# Patient Record
Sex: MALE | Race: WHITE | NOT HISPANIC OR LATINO | URBAN - METROPOLITAN AREA
[De-identification: names, ages, dates, MRNs, and addresses within clinical notes are randomized per-mention and may not be internally consistent; named-entity substitution may affect disease eponyms.]

---

## 2017-05-02 ENCOUNTER — OUTPATIENT (OUTPATIENT)
Dept: OUTPATIENT SERVICES | Facility: HOSPITAL | Age: 67
LOS: 1 days | End: 2017-05-02
Payer: MEDICARE

## 2017-05-02 DIAGNOSIS — M54.16 RADICULOPATHY, LUMBAR REGION: ICD-10-CM

## 2017-05-02 PROCEDURE — 62323 NJX INTERLAMINAR LMBR/SAC: CPT

## 2017-05-02 PROCEDURE — 77003 FLUOROGUIDE FOR SPINE INJECT: CPT

## 2018-09-06 ENCOUNTER — TRANSCRIPTION ENCOUNTER (OUTPATIENT)
Age: 68
End: 2018-09-06

## 2018-09-06 ENCOUNTER — INPATIENT (INPATIENT)
Facility: HOSPITAL | Age: 68
LOS: 6 days | Discharge: HOME | DRG: 378 | End: 2018-09-13
Attending: INTERNAL MEDICINE | Admitting: INTERNAL MEDICINE
Payer: MEDICARE

## 2018-09-06 VITALS
WEIGHT: 199.08 LBS | DIASTOLIC BLOOD PRESSURE: 82 MMHG | RESPIRATION RATE: 18 BRPM | TEMPERATURE: 97 F | OXYGEN SATURATION: 100 % | HEART RATE: 103 BPM | SYSTOLIC BLOOD PRESSURE: 145 MMHG

## 2018-09-06 DIAGNOSIS — J35.1 HYPERTROPHY OF TONSILS: Chronic | ICD-10-CM

## 2018-09-06 DIAGNOSIS — D64.9 ANEMIA, UNSPECIFIED: ICD-10-CM

## 2018-09-06 DIAGNOSIS — D62 ACUTE POSTHEMORRHAGIC ANEMIA: ICD-10-CM

## 2018-09-06 DIAGNOSIS — R74.8 ABNORMAL LEVELS OF OTHER SERUM ENZYMES: ICD-10-CM

## 2018-09-06 LAB
ABO RH CONFIRMATION: SIGNIFICANT CHANGE UP
ALBUMIN SERPL ELPH-MCNC: 3 G/DL — LOW (ref 3.3–5)
ALP SERPL-CCNC: 53 U/L — SIGNIFICANT CHANGE UP (ref 40–120)
ALT FLD-CCNC: 17 U/L — SIGNIFICANT CHANGE UP (ref 12–78)
ANION GAP SERPL CALC-SCNC: 9 MMOL/L — SIGNIFICANT CHANGE UP (ref 5–17)
APTT BLD: 29.1 SEC — SIGNIFICANT CHANGE UP (ref 27.5–37.4)
AST SERPL-CCNC: 16 U/L — SIGNIFICANT CHANGE UP (ref 15–37)
BASOPHILS # BLD AUTO: 0.02 K/UL — SIGNIFICANT CHANGE UP (ref 0–0.2)
BASOPHILS NFR BLD AUTO: 0.1 % — SIGNIFICANT CHANGE UP (ref 0–2)
BILIRUB SERPL-MCNC: 0.1 MG/DL — LOW (ref 0.2–1.2)
BUN SERPL-MCNC: 41 MG/DL — HIGH (ref 7–23)
CALCIUM SERPL-MCNC: 8.4 MG/DL — LOW (ref 8.5–10.1)
CHLORIDE SERPL-SCNC: 107 MMOL/L — SIGNIFICANT CHANGE UP (ref 96–108)
CK MB BLD-MCNC: 4.4 % — HIGH (ref 0–3.5)
CK MB CFR SERPL CALC: 2.2 NG/ML — SIGNIFICANT CHANGE UP (ref 0–3.6)
CK MB CFR SERPL CALC: 3.5 NG/ML — SIGNIFICANT CHANGE UP (ref 0–3.6)
CK SERPL-CCNC: 79 U/L — SIGNIFICANT CHANGE UP (ref 26–308)
CO2 SERPL-SCNC: 22 MMOL/L — SIGNIFICANT CHANGE UP (ref 22–31)
CREAT SERPL-MCNC: 1.3 MG/DL — SIGNIFICANT CHANGE UP (ref 0.5–1.3)
EOSINOPHIL # BLD AUTO: 0.02 K/UL — SIGNIFICANT CHANGE UP (ref 0–0.5)
EOSINOPHIL NFR BLD AUTO: 0.1 % — SIGNIFICANT CHANGE UP (ref 0–6)
GLUCOSE SERPL-MCNC: 132 MG/DL — HIGH (ref 70–99)
HCT VFR BLD CALC: 14.3 % — CRITICAL LOW (ref 39–50)
HGB BLD-MCNC: 4.5 G/DL — CRITICAL LOW (ref 13–17)
IMM GRANULOCYTES NFR BLD AUTO: 1.5 % — SIGNIFICANT CHANGE UP (ref 0–1.5)
INR BLD: 1.1 RATIO — SIGNIFICANT CHANGE UP (ref 0.88–1.16)
LACTATE SERPL-SCNC: 1.8 MMOL/L — SIGNIFICANT CHANGE UP (ref 0.7–2)
LYMPHOCYTES # BLD AUTO: 1.48 K/UL — SIGNIFICANT CHANGE UP (ref 1–3.3)
LYMPHOCYTES # BLD AUTO: 9.2 % — LOW (ref 13–44)
MCHC RBC-ENTMCNC: 28.3 PG — SIGNIFICANT CHANGE UP (ref 27–34)
MCHC RBC-ENTMCNC: 31.5 GM/DL — LOW (ref 32–36)
MCV RBC AUTO: 89.9 FL — SIGNIFICANT CHANGE UP (ref 80–100)
MONOCYTES # BLD AUTO: 1.05 K/UL — HIGH (ref 0–0.9)
MONOCYTES NFR BLD AUTO: 6.5 % — SIGNIFICANT CHANGE UP (ref 2–14)
NEUTROPHILS # BLD AUTO: 13.35 K/UL — HIGH (ref 1.8–7.4)
NEUTROPHILS NFR BLD AUTO: 82.6 % — HIGH (ref 43–77)
PLATELET # BLD AUTO: 270 K/UL — SIGNIFICANT CHANGE UP (ref 150–400)
POTASSIUM SERPL-MCNC: 4.2 MMOL/L — SIGNIFICANT CHANGE UP (ref 3.5–5.3)
POTASSIUM SERPL-SCNC: 4.2 MMOL/L — SIGNIFICANT CHANGE UP (ref 3.5–5.3)
PROT SERPL-MCNC: 5.8 G/DL — LOW (ref 6–8.3)
PROTHROM AB SERPL-ACNC: 12 SEC — SIGNIFICANT CHANGE UP (ref 9.8–12.7)
RBC # BLD: 1.59 M/UL — LOW (ref 4.2–5.8)
RBC # FLD: 13.2 % — SIGNIFICANT CHANGE UP (ref 10.3–14.5)
SODIUM SERPL-SCNC: 138 MMOL/L — SIGNIFICANT CHANGE UP (ref 135–145)
TROPONIN I SERPL-MCNC: 0.16 NG/ML — HIGH (ref 0.01–0.04)
TROPONIN I SERPL-MCNC: 0.44 NG/ML — HIGH (ref 0.01–0.04)
WBC # BLD: 16.17 K/UL — HIGH (ref 3.8–10.5)
WBC # FLD AUTO: 16.17 K/UL — HIGH (ref 3.8–10.5)

## 2018-09-06 PROCEDURE — 70450 CT HEAD/BRAIN W/O DYE: CPT | Mod: 26

## 2018-09-06 PROCEDURE — 93010 ELECTROCARDIOGRAM REPORT: CPT | Mod: 77

## 2018-09-06 PROCEDURE — 99285 EMERGENCY DEPT VISIT HI MDM: CPT

## 2018-09-06 PROCEDURE — 93010 ELECTROCARDIOGRAM REPORT: CPT

## 2018-09-06 PROCEDURE — 99223 1ST HOSP IP/OBS HIGH 75: CPT

## 2018-09-06 PROCEDURE — 71045 X-RAY EXAM CHEST 1 VIEW: CPT | Mod: 26

## 2018-09-06 RX ORDER — MIRTAZAPINE 45 MG/1
30 TABLET, ORALLY DISINTEGRATING ORAL AT BEDTIME
Qty: 0 | Refills: 0 | Status: DISCONTINUED | OUTPATIENT
Start: 2018-09-06 | End: 2018-09-13

## 2018-09-06 RX ORDER — DOCUSATE SODIUM 100 MG
100 CAPSULE ORAL
Qty: 0 | Refills: 0 | Status: DISCONTINUED | OUTPATIENT
Start: 2018-09-06 | End: 2018-09-13

## 2018-09-06 RX ORDER — SIMVASTATIN 20 MG/1
20 TABLET, FILM COATED ORAL AT BEDTIME
Qty: 0 | Refills: 0 | Status: DISCONTINUED | OUTPATIENT
Start: 2018-09-06 | End: 2018-09-13

## 2018-09-06 RX ORDER — FERROUS SULFATE 325(65) MG
325 TABLET ORAL
Qty: 0 | Refills: 0 | Status: DISCONTINUED | OUTPATIENT
Start: 2018-09-06 | End: 2018-09-13

## 2018-09-06 RX ORDER — INFLUENZA VIRUS VACCINE 15; 15; 15; 15 UG/.5ML; UG/.5ML; UG/.5ML; UG/.5ML
0.5 SUSPENSION INTRAMUSCULAR ONCE
Qty: 0 | Refills: 0 | Status: DISCONTINUED | OUTPATIENT
Start: 2018-09-06 | End: 2018-09-13

## 2018-09-06 RX ORDER — FLUTICASONE PROPIONATE 50 MCG
1 SPRAY, SUSPENSION NASAL
Qty: 0 | Refills: 0 | Status: DISCONTINUED | OUTPATIENT
Start: 2018-09-06 | End: 2018-09-13

## 2018-09-06 RX ORDER — ACETAMINOPHEN 500 MG
650 TABLET ORAL EVERY 6 HOURS
Qty: 0 | Refills: 0 | Status: DISCONTINUED | OUTPATIENT
Start: 2018-09-06 | End: 2018-09-13

## 2018-09-06 RX ORDER — METOPROLOL TARTRATE 50 MG
25 TABLET ORAL
Qty: 0 | Refills: 0 | Status: DISCONTINUED | OUTPATIENT
Start: 2018-09-06 | End: 2018-09-13

## 2018-09-06 RX ORDER — ONDANSETRON 8 MG/1
4 TABLET, FILM COATED ORAL EVERY 6 HOURS
Qty: 0 | Refills: 0 | Status: DISCONTINUED | OUTPATIENT
Start: 2018-09-06 | End: 2018-09-13

## 2018-09-06 RX ORDER — SERTRALINE 25 MG/1
50 TABLET, FILM COATED ORAL DAILY
Qty: 0 | Refills: 0 | Status: DISCONTINUED | OUTPATIENT
Start: 2018-09-06 | End: 2018-09-13

## 2018-09-06 RX ORDER — TAMSULOSIN HYDROCHLORIDE 0.4 MG/1
0.4 CAPSULE ORAL AT BEDTIME
Qty: 0 | Refills: 0 | Status: DISCONTINUED | OUTPATIENT
Start: 2018-09-06 | End: 2018-09-13

## 2018-09-06 RX ADMIN — TAMSULOSIN HYDROCHLORIDE 0.4 MILLIGRAM(S): 0.4 CAPSULE ORAL at 23:00

## 2018-09-06 RX ADMIN — Medication 1 SPRAY(S): at 18:10

## 2018-09-06 RX ADMIN — MIRTAZAPINE 30 MILLIGRAM(S): 45 TABLET, ORALLY DISINTEGRATING ORAL at 23:00

## 2018-09-06 RX ADMIN — Medication 200 MILLIGRAM(S): at 18:10

## 2018-09-06 RX ADMIN — SIMVASTATIN 20 MILLIGRAM(S): 20 TABLET, FILM COATED ORAL at 23:00

## 2018-09-06 RX ADMIN — Medication 25 MILLIGRAM(S): at 18:10

## 2018-09-06 RX ADMIN — Medication 325 MILLIGRAM(S): at 18:10

## 2018-09-06 RX ADMIN — Medication 100 MILLIGRAM(S): at 18:10

## 2018-09-06 NOTE — H&P ADULT - ASSESSMENT
67y M hx of alcohol abuse (denies drinking), anemia never required blood transfusion, prior hemorrhagic stroke here with generalized weakness, fatigue, exertional syncope. Pt  4-5 days ago walked into grocery store from parking lot and passed out in threshold. States did not hit head. States drank some fluids, declines EMS transport and went about his shopping, When he got home he walked back and forth to house w 4 loads of groceries and afterward "passed out" again on his bed, no head strike. States in last several days has felt generally weak, fatigue, dyspnea w minimal exertion. States he intermittently takes ASA "when he has a heart pain." States he has never had an EGD or colonoscopy.

## 2018-09-06 NOTE — CONSULT NOTE ADULT - SUBJECTIVE AND OBJECTIVE BOX
Chief Complaint:  Patient is a 67y old  Male who presents with a chief complaint of anemia   · Chief Complaint: The patient is a 67y Male complaining of weakness.	  · HPI Objective Statement: 67y M hx of alcohol abuse (denies drinking), anemia never required blood transfusion, prior hemorrhagic stroke here with generalized weakness, fatigue, exertional syncope. Pt states 4-5 days ago walked into grocery store from parking lot and passed out in threshold. States did not hit head. States drank some fluids, declines EMS transport and went about his shopping, When he got home he walked back and forth to house w 4 loads of groceries and afterward "passed out" again on his bed, no head strike. States in last several days has felt generally weak, fatigue, dyspnea w minimal exertion. States he intermittently takes ASA "when he has a heart pain." States he has never had an EGD or colonoscopy. States he is unaware of cause of his anemia. He denies bloody stool. +Dark stool. Refuses rectal exam.  PCP- Gerardo Anaya	  he has had an  upper gastrointestinal endoscopy/colonosocpy in [facility] in the past and now he is here for follow up.      Allergies:  No Known Allergies      Medications:      PMHX/PSHX:  Alcohol abuse  HTN (hypertension)  Hyponatremia  FTT (failure to thrive) in adult  Seizure  Anemia  Retention of urine  UTI (lower urinary tract infection)  Depression  CVA (cerebral infarction)  Backache  Retinal arterial branch occlusion, right  No significant past surgical history      Family history:  Family history unknown      Social History:     ROS:     General:  No wt loss, fevers, chills, night sweats, fatigue,   Eyes:  Good vision, no reported pain  ENT:  No sore throat, pain, runny nose, dysphagia  CV:  No pain, palpitations, hypo/hypertension  Resp:  No dyspnea, cough, tachypnea, wheezing  GI:  No pain, No nausea, No vomiting, No diarrhea, No constipation, No weight loss, No fever, No pruritis, No rectal bleeding, No tarry stools, No dysphagia,  :  No pain, bleeding, incontinence, nocturia  Muscle:  No pain, weakness  Neuro:  No weakness, tingling, memory problems  Psych:  No fatigue, insomnia, mood problems, depression  Endocrine:  No polyuria, polydipsia, cold/heat intolerance  Heme:  No petechiae, ecchymosis, easy bruisability  Skin:  No rash, tattoos, scars, edema      PHYSICAL EXAM:   Vital Signs:  Vital Signs Last 24 Hrs  T(C): 37.2 (06 Sep 2018 15:17), Max: 37.2 (06 Sep 2018 14:58)  T(F): 98.9 (06 Sep 2018 15:17), Max: 99 (06 Sep 2018 14:58)  HR: 105 (06 Sep 2018 15:17) (100 - 107)  BP: 117/58 (06 Sep 2018 15:17) (117/58 - 150/53)  BP(mean): --  RR: 18 (06 Sep 2018 15:17) (14 - 18)  SpO2: 99% (06 Sep 2018 15:17) (99% - 100%)  Daily     Daily     GENERAL:  Appears stated age, well-groomed, well-nourished, no distress  HEENT:  NC/AT,  conjunctivae clear and pink, no thyromegaly, nodules, adenopathy, no JVD, sclera -anicteric  CHEST:  Full & symmetric excursion, no increased effort, breath sounds clear  HEART:  Regular rhythm, S1, S2, no murmur/rub/S3/S4, no abdominal bruit, no edema  ABDOMEN:  Soft, non-tender, non-distended, normoactive bowel sounds,  no masses ,no hepato-splenomegaly, no signs of chronic liver disease  EXTEREMITIES:  no cyanosis,clubbing or edema  SKIN:  No rash/erythema/ecchymoses/petechiae/wounds/abscess/warm/dry  NEURO:  Alert, oriented, no asterixis, no tremor, no encephalopathy    LABS:                        4.5    16.17 )-----------( 270      ( 06 Sep 2018 11:04 )             14.3     09-06    138  |  107  |  41<H>  ----------------------------<  132<H>  4.2   |  22  |  1.30    Ca    8.4<L>      06 Sep 2018 11:04    TPro  5.8<L>  /  Alb  3.0<L>  /  TBili  0.1<L>  /  DBili  x   /  AST  16  /  ALT  17  /  AlkPhos  53  09-06    LIVER FUNCTIONS - ( 06 Sep 2018 11:04 )  Alb: 3.0 g/dL / Pro: 5.8 g/dL / ALK PHOS: 53 U/L / ALT: 17 U/L / AST: 16 U/L / GGT: x           PT/INR - ( 06 Sep 2018 11:04 )   PT: 12.0 sec;   INR: 1.10 ratio         PTT - ( 06 Sep 2018 11:04 )  PTT:29.1 sec        Imaging:

## 2018-09-06 NOTE — CONSULT NOTE ADULT - PROBLEM SELECTOR RECOMMENDATION 9
daily cbc   transfuse prn   consider hematology eval  check iron studies   ppi once a day  check stool occult blood  further recommendations pending above

## 2018-09-06 NOTE — ED ADULT NURSE NOTE - OBJECTIVE STATEMENT
pt with complaints of chest pain and memory "issues"  since he had heat stroke seizures on Sunday. pt denies any alcohol problems, states his ex wife  had the problems.

## 2018-09-06 NOTE — ED PROVIDER NOTE - PHYSICAL EXAMINATION
Gen: WNWD NAD  HEENT: NCAT R pupil dilated and no direct light reflex, +consuensual reflex, L pupil round and reactive  EOMI normal pharynx pale mucous memb   Neck: supple  CV: Tachy, no murmur  Lung: CTA BL  Abd: +BS soft NTND  Ext: wwp, palp pulses, FROMx4, no cce  Rectal: declines   Neuro: CN grossly intact, sensation intact, moves all ext

## 2018-09-06 NOTE — H&P ADULT - NSHPPHYSICALEXAM_GEN_ALL_CORE
General: WN/WD NAD  PERRLA  Neurology: A&Ox3, nonfocal, KEY x 4  Respiratory: CTA B/L  CV: RRR, S1S2, no murmurs, rubs or gallops  Abdominal: Soft, NT, ND +BS, Last BM  Extremities: No edema, + peripheral pulses  Skin Normal

## 2018-09-06 NOTE — ED ADULT NURSE NOTE - PMH
Alcohol abuse  hx of etoh abuse  Anemia    Backache  spinal stenosis  CVA (cerebral infarction)  6/2014- small rt ant.cerebral artery subacute infarct  Depression    FTT (failure to thrive) in adult  6/2014  HTN (hypertension)    Hyponatremia  124 in June 2014  Retention of urine  6/2014  Retinal arterial branch occlusion, right  vision loss right eye  Seizure  6/2014- abnormal eeg  UTI (lower urinary tract infection)  mssa

## 2018-09-06 NOTE — H&P ADULT - NSHPLABSRESULTS_GEN_ALL_CORE
Lab Results:  CBC  CBC Full  -  ( 06 Sep 2018 11:04 )  WBC Count : 16.17 K/uL  Hemoglobin : 4.5 g/dL  Hematocrit : 14.3 %  Platelet Count - Automated : 270 K/uL  Mean Cell Volume : 89.9 fl  Mean Cell Hemoglobin : 28.3 pg  Mean Cell Hemoglobin Concentration : 31.5 gm/dL  Auto Neutrophil # : 13.35 K/uL  Auto Lymphocyte # : 1.48 K/uL  Auto Monocyte # : 1.05 K/uL  Auto Eosinophil # : 0.02 K/uL  Auto Basophil # : 0.02 K/uL  Auto Neutrophil % : 82.6 %  Auto Lymphocyte % : 9.2 %  Auto Monocyte % : 6.5 %  Auto Eosinophil % : 0.1 %  Auto Basophil % : 0.1 %    .		Differential:	[] Automated		[] Manual  Chemistry                        4.5    16.17 )-----------( 270      ( 06 Sep 2018 11:04 )             14.3     09-06    138  |  107  |  41<H>  ----------------------------<  132<H>  4.2   |  22  |  1.30    Ca    8.4<L>      06 Sep 2018 11:04    TPro  5.8<L>  /  Alb  3.0<L>  /  TBili  0.1<L>  /  DBili  x   /  AST  16  /  ALT  17  /  AlkPhos  53  09-06    LIVER FUNCTIONS - ( 06 Sep 2018 11:04 )  Alb: 3.0 g/dL / Pro: 5.8 g/dL / ALK PHOS: 53 U/L / ALT: 17 U/L / AST: 16 U/L / GGT: x           PT/INR - ( 06 Sep 2018 11:04 )   PT: 12.0 sec;   INR: 1.10 ratio         PTT - ( 06 Sep 2018 11:04 )  PTT:29.1 sec          MICROBIOLOGY/CULTURES:      RADIOLOGY RESULTS:

## 2018-09-06 NOTE — CHART NOTE - NSCHARTNOTEFT_GEN_A_CORE
PGY-1 On Call Note    Called by RN, pt c/o: chest pain     Pt seen and examined at bedside. Pt states he has midsternal pressure-like 5/10 chest pain. Patient admits of getting these chest pains before and talking ASA and clonopin for he believes it is triggered by his anxiety. Patient denies SOB, palpitations, dizziness, n/v, diaphoresis or any other symptoms.   Pt otherwise feeling well with no other complaints.     T(F): 100.3 (09-06-18 @ 23:03), Max: 100.3 (09-06-18 @ 23:03)  HR: 87 (09-06-18 @ 23:03) (80 - 114)  BP: 103/64 (09-06-18 @ 23:03) (103/64 - 150/53)  RR: 18 (09-06-18 @ 23:03) (14 - 19)  SpO2: 99% (09-06-18 @ 23:03) (99% - 100%)    Physical Exam:  Gen: NAD  HEENT: PERRLA  Cardio: +S1, +S2, RRR  Lungs: CTA B/L, No w/r/r  Abd: soft, NT/ND, +BS x 4 quadrants  Ext: No pedal edema, no calf tenderness                          4.5    16.17 )-----------( 270      ( 06 Sep 2018 11:04 )             14.3     09-06    138  |  107  |  41<H>  ----------------------------<  132<H>  4.2   |  22  |  1.30    Ca    8.4<L>      06 Sep 2018 11:04    TPro  5.8<L>  /  Alb  3.0<L>  /  TBili  0.1<L>  /  DBili  x   /  AST  16  /  ALT  17  /  AlkPhos  53  09-06      A/P: 67y M hx of alcohol abuse (denies drinking), anemia never required blood transfusion, prior hemorrhagic stroke here with generalized weakness, fatigue, exertional syncope. PGY-1 On Call Note    Called by RN, pt c/o: chest pain     Pt seen and examined at bedside. Pt states he has midsternal pressure-like 5/10 chest pain. Patient admits of getting these chest pains before and talking ASA and clonopin for he believes it is triggered by his anxiety. Patient denies SOB, palpitations, dizziness, n/v, diaphoresis or any other symptoms.   Pt otherwise feeling well with no other complaints.     T(F): 100.3 (09-06-18 @ 23:03), Max: 100.3 (09-06-18 @ 23:03)  HR: 87 (09-06-18 @ 23:03) (80 - 114)  BP: 103/64 (09-06-18 @ 23:03) (103/64 - 150/53)  RR: 18 (09-06-18 @ 23:03) (14 - 19)  SpO2: 99% (09-06-18 @ 23:03) (99% - 100%)    Physical Exam:  Gen: NAD  HEENT: PERRLA  Cardio: +S1, +S2, RRR, chest tender to palpation in midsternal area  Lungs: CTA B/L, No w/r/r  Abd: soft, NT/ND, +BS x 4 quadrants  Ext: No pedal edema, no calf tenderness                          4.5    16.17 )-----------( 270      ( 06 Sep 2018 11:04 )             14.3     09-06    138  |  107  |  41<H>  ----------------------------<  132<H>  4.2   |  22  |  1.30    Ca    8.4<L>      06 Sep 2018 11:04    TPro  5.8<L>  /  Alb  3.0<L>  /  TBili  0.1<L>  /  DBili  x   /  AST  16  /  ALT  17  /  AlkPhos  53  09-06      A/P: 67y M hx of alcohol abuse (denies drinking), anemia never required blood transfusion, prior hemorrhagic stroke here with generalized weakness, fatigue, exertional syncope admitted for anemia s/p transfusions now complaining of midsternal, reproducible, 5/10, pressure-like, non radiating chest pain. Patient was in no acute distress and speaking in complete sentences. EKG revealed no signs of ischemic changes. Cardiac markers x3 are elevated likely 2/2 demand ischemia. Aspirin not recommended at this moment for bleeding has not been ruled out. Currently hemodynamically stable. Dr. Thomson (Cardiologist) made aware and agreed with no further interventions at this moment.

## 2018-09-06 NOTE — CONSULT NOTE ADULT - ASSESSMENT
67y old M PMH EtOH abuse, anemia, prior hemorhagic stroke 2014, HTN, seizure 2014 who presents with a chief complaint of weakness and syncope. Found to be anemic, with hemoglobin 4.5, troponins slightly elevated.     Likely demand ischemia due to significant anemia.  -continue with PRBC transfusion 2 units  -EGD/colonoscopy to establish source of blood loss. 67y old M PMH EtOH abuse, anemia, prior hemorhagic stroke 2014, HTN, seizure 2014 who presents with a chief complaint of weakness and syncope. Found to be anemic, with hemoglobin 4.5, troponins slightly elevated.     Elevated troponins likely demand ischemia due to significant anemia.    - will order second set of cardiac enzymes  -continue with PRBC transfusion 2 units  -EGD/colonoscopy to establish source of blood loss. 67y old M PMH EtOH abuse, anemia, prior hemorhagic stroke 2014, HTN, seizure 2014 who presents with a chief complaint of weakness and syncope. Found to be anemic, with hemoglobin 4.5, troponins slightly elevated.     Elevated troponins likely demand ischemia due to significant anemia.    - will order two more sets of cardiac enzymes  -will get TTE  -continue with PRBC transfusion 2 units  -check stool occult blood, EGD/colonoscopy per GI  - further cardiac workup depends on clinical course  -all other orders per house staff  -will follow

## 2018-09-06 NOTE — ED PROVIDER NOTE - MEDICAL DECISION MAKING DETAILS
67y M hx of alcohol abuse (denies drinking), anemia never required blood transfusion, prior hemorrhagic stroke here with generalized weakness, fatigue, exertional syncope, PHILLIPS, palp. Suspect likely anemic as he is extremely pale w hx of anemia. Possible electrolyte disturbance. CT head due to recent fall.

## 2018-09-06 NOTE — ED ADULT NURSE NOTE - NSIMPLEMENTINTERV_GEN_ALL_ED
Implemented All Universal Safety Interventions:  Salters to call system. Call bell, personal items and telephone within reach. Instruct patient to call for assistance. Room bathroom lighting operational. Non-slip footwear when patient is off stretcher. Physically safe environment: no spills, clutter or unnecessary equipment. Stretcher in lowest position, wheels locked, appropriate side rails in place.

## 2018-09-06 NOTE — CONSULT NOTE ADULT - SUBJECTIVE AND OBJECTIVE BOX
[facility]         Jona Bush, Alix, Ghada, Jian, Jayy, Cindy        [phone] (office)    CHIEF COMPLAINT: Patient is a 67y old M PMH EtOH abuse, anemia, prior hemorhagic stroke 2014, HTN, seizure 2014who presents with a chief complaint of weakness and syncope. On 9/1 patient had a syncopal episode after walking into grocery store from hot parking lot. Drank gatorade and completed his shopping trip. Fainted again when at home. Felt fatigued since that time, with episodes of dizziness. Last night he was acting "loopy" and his daughter called EMS. Patient states his HR was in 150S, but he refused EMS transport to [place]. Patient admits intermittent chest pain, for which he has been taking ASA 81, and pounding in his ears, associated with a rapid heart rate. Admits swelling in feet. Denies N/V, diaphoresis, pain radiating to jaw, neck, arms. Admits dark stool      PAST MEDICAL & SURGICAL HISTORY:  Alcohol abuse: hx of etoh abuse  HTN (hypertension)  Hyponatremia: 124 in June 2014  FTT (failure to thrive) in adult: 6/2014  Seizure: 6/2014- abnormal eeg  Anemia  Retention of urine: 6/2014  UTI (lower urinary tract infection): mssa  Depression  CVA (cerebral infarction): 6/2014- small rt ant.cerebral artery subacute infarct  Backache: spinal stenosis  Retinal arterial branch occlusion, right: vision loss right eye  No significant past surgical history      SOCIAL HISTORY: No active tobacco or illicit drug use, history of EtOH abuse    FAMILY HISTORY:  Family history unknown      Outpatient medications:    MEDICATIONS  (STANDING):    MEDICATIONS  (PRN):      Allergies    No Known Allergies    Intolerances        REVIEW OF SYSTEMS: Is negative for eye, ENT, GI, , allergic, dermatologic, musculoskeletal and neurologic, except as described above.    VITAL SIGNS:   Vital Signs Last 24 Hrs  T(C): 37.2 (06 Sep 2018 15:17), Max: 37.2 (06 Sep 2018 14:58)  T(F): 98.9 (06 Sep 2018 15:17), Max: 99 (06 Sep 2018 14:58)  HR: 105 (06 Sep 2018 15:17) (100 - 107)  BP: 117/58 (06 Sep 2018 15:17) (117/58 - 150/53)  BP(mean): --  RR: 18 (06 Sep 2018 15:17) (14 - 18)  SpO2: 99% (06 Sep 2018 15:17) (99% - 100%)    I&O's Summary      PHYSICAL EXAM:    Constitutional: NAD, awake and alert, obese  Eyes:  EOMI, no oral cyanosis, conjunctivae clear, anicteric.  Pulmonary: Non-labored, breath sounds are clear bilaterally, no wheezing, rales or rhonchi  Cardiovascular:  tachycardic, regular rhythm. No murmur.  No rubs, gallops or clicks  Gastrointestinal: Bowel Sounds present, soft, nontender.   Lymph: No peripheral edema.   Neurological: Alert, strength and sensitivity are grossly intact  Skin: No obvious lesions/rashes.   Psych:  Mood & affect appropriate .    LABS: All Labs Reviewed:                        4.5    16.17 )-----------( 270      ( 06 Sep 2018 11:04 )             14.3     06 Sep 2018 11:04    138    |  107    |  41     ----------------------------<  132    4.2     |  22     |  1.30     Ca    8.4        06 Sep 2018 11:04    TPro  5.8    /  Alb  3.0    /  TBili  0.1    /  DBili  x      /  AST  16     /  ALT  17     /  AlkPhos  53     06 Sep 2018 11:04    PT/INR - ( 06 Sep 2018 11:04 )   PT: 12.0 sec;   INR: 1.10 ratio         PTT - ( 06 Sep 2018 11:04 )  PTT:29.1 sec  CARDIAC MARKERS ( 06 Sep 2018 11:04 )  .164 ng/mL / x     / x     / x     / 2.2 ng/mL      Blood Culture:         RADIOLOGY:    EKG:    Impression/Plan: [facility]         Jona Bush, Alix, Ghada, Jian, Jayy, Cindy        [phone] (office)    CHIEF COMPLAINT: Patient is a 67y old M PMH EtOH abuse, anemia, prior hemorhagic stroke 2014, HTN, seizure 2014who presents with a chief complaint of weakness and syncope. On 9/1 patient had a syncopal episode after walking into grocery store from hot parking lot. Drank gatorade and completed his shopping trip. Fainted again when at home. Felt fatigued since that time, with episodes of dizziness. Last night he was acting "loopy" and his daughter called EMS. Patient states his HR was in 150S, but he refused EMS transport to [place]. Patient admits intermittent chest pain, for which he has been taking ASA 81, and pounding in his ears, associated with a rapid heart rate. Admits swelling in feet. Denies N/V, diaphoresis, pain radiating to jaw, neck, arms. States that he had a nuclear stress test in 2013, the results of which were normal. Admits dark stool over past several weeks.      PAST MEDICAL & SURGICAL HISTORY:  Alcohol abuse: hx of etoh abuse  HTN (hypertension)  Hyponatremia: 124 in June 2014  FTT (failure to thrive) in adult: 6/2014  Seizure: 6/2014- abnormal eeg  Anemia  Retention of urine: 6/2014  UTI (lower urinary tract infection): mssa  Depression  CVA (cerebral infarction): 6/2014- small rt ant.cerebral artery subacute infarct  Backache: spinal stenosis  Retinal arterial branch occlusion, right: vision loss right eye  No significant past surgical history      SOCIAL HISTORY: No active tobacco or illicit drug use, history of EtOH abuse    FAMILY HISTORY:  Family history unknown      Outpatient medications:    MEDICATIONS  (STANDING):    MEDICATIONS  (PRN):      Allergies    No Known Allergies    Intolerances        REVIEW OF SYSTEMS: Is negative for eye, ENT, GI, , allergic, dermatologic, musculoskeletal and neurologic, except as described above.    VITAL SIGNS:   Vital Signs Last 24 Hrs  T(C): 37.2 (06 Sep 2018 15:17), Max: 37.2 (06 Sep 2018 14:58)  T(F): 98.9 (06 Sep 2018 15:17), Max: 99 (06 Sep 2018 14:58)  HR: 105 (06 Sep 2018 15:17) (100 - 107)  BP: 117/58 (06 Sep 2018 15:17) (117/58 - 150/53)  BP(mean): --  RR: 18 (06 Sep 2018 15:17) (14 - 18)  SpO2: 99% (06 Sep 2018 15:17) (99% - 100%)    I&O's Summary      PHYSICAL EXAM:    Constitutional: NAD, awake and alert, obese  Eyes:  EOMI, no oral cyanosis, conjunctivae clear, anicteric.  Pulmonary: Non-labored, breath sounds are clear bilaterally, no wheezing, rales or rhonchi  Cardiovascular:  tachycardic, regular rhythm. No murmur.  No rubs, gallops or clicks  Gastrointestinal: Bowel Sounds present, soft, nontender.   Lymph: No peripheral edema.   Neurological: Alert, strength and sensitivity are grossly intact  Skin: No obvious lesions/rashes.   Psych:  Mood & affect appropriate .    LABS: All Labs Reviewed:                        4.5    16.17 )-----------( 270      ( 06 Sep 2018 11:04 )             14.3     06 Sep 2018 11:04    138    |  107    |  41     ----------------------------<  132    4.2     |  22     |  1.30     Ca    8.4        06 Sep 2018 11:04    TPro  5.8    /  Alb  3.0    /  TBili  0.1    /  DBili  x      /  AST  16     /  ALT  17     /  AlkPhos  53     06 Sep 2018 11:04    PT/INR - ( 06 Sep 2018 11:04 )   PT: 12.0 sec;   INR: 1.10 ratio         PTT - ( 06 Sep 2018 11:04 )  PTT:29.1 sec  CARDIAC MARKERS ( 06 Sep 2018 11:04 )  .164 ng/mL / x     / x     / x     / 2.2 ng/mL      Blood Culture:         RADIOLOGY:    EKG:    Impression/Plan:

## 2018-09-06 NOTE — ED PROVIDER NOTE - OBJECTIVE STATEMENT
67y M hx of alcohol abuse (denies drinking), anemia never required blood transfusion, prior hemorrhagic stroke here with generalized weakness, fatigue, exertional syncope. Pt  4-5 days ago walked into grocery store from parking lot and passed out in threshold. States did not hit head. States drank some fluids, declines EMS transport and went about his shopping, When he got home he walked back and forth to house w 4 loads of groceries and afterward "passed out" again on his bed, no head strike. States in last several days has felt generally weak, fatigue, dyspnea w minimal exertion. States he intermittently takes ASA "when he has a heart pain." States he has never had an EGD or colonoscopy. States he is unaware of cause of his anemia. He denies bloody stool. +Dark stool. Refuses rectal exam.   PCP- Gerardo Anaya

## 2018-09-07 ENCOUNTER — RESULT REVIEW (OUTPATIENT)
Age: 68
End: 2018-09-07

## 2018-09-07 LAB
ALBUMIN SERPL ELPH-MCNC: 2.9 G/DL — LOW (ref 3.3–5)
ALP SERPL-CCNC: 62 U/L — SIGNIFICANT CHANGE UP (ref 40–120)
ALT FLD-CCNC: 37 U/L — SIGNIFICANT CHANGE UP (ref 12–78)
ANION GAP SERPL CALC-SCNC: 6 MMOL/L — SIGNIFICANT CHANGE UP (ref 5–17)
APPEARANCE UR: CLEAR — SIGNIFICANT CHANGE UP
AST SERPL-CCNC: 28 U/L — SIGNIFICANT CHANGE UP (ref 15–37)
BILIRUB SERPL-MCNC: 0.3 MG/DL — SIGNIFICANT CHANGE UP (ref 0.2–1.2)
BILIRUB UR-MCNC: NEGATIVE — SIGNIFICANT CHANGE UP
BUN SERPL-MCNC: 19 MG/DL — SIGNIFICANT CHANGE UP (ref 7–23)
CALCIUM SERPL-MCNC: 8.3 MG/DL — LOW (ref 8.5–10.1)
CHLORIDE SERPL-SCNC: 110 MMOL/L — HIGH (ref 96–108)
CK MB BLD-MCNC: 4.1 % — HIGH (ref 0–3.5)
CK MB CFR SERPL CALC: 3.1 NG/ML — SIGNIFICANT CHANGE UP (ref 0–3.6)
CK SERPL-CCNC: 76 U/L — SIGNIFICANT CHANGE UP (ref 26–308)
CO2 SERPL-SCNC: 26 MMOL/L — SIGNIFICANT CHANGE UP (ref 22–31)
COLOR SPEC: YELLOW — SIGNIFICANT CHANGE UP
CREAT SERPL-MCNC: 0.99 MG/DL — SIGNIFICANT CHANGE UP (ref 0.5–1.3)
DIFF PNL FLD: NEGATIVE — SIGNIFICANT CHANGE UP
GLUCOSE SERPL-MCNC: 121 MG/DL — HIGH (ref 70–99)
GLUCOSE UR QL: NEGATIVE — SIGNIFICANT CHANGE UP
HCT VFR BLD CALC: 24.1 % — LOW (ref 39–50)
HCT VFR BLD CALC: 24.8 % — LOW (ref 39–50)
HGB BLD-MCNC: 8 G/DL — LOW (ref 13–17)
HGB BLD-MCNC: 8.2 G/DL — LOW (ref 13–17)
IRON SATN MFR SERPL: 224 UG/DL — HIGH (ref 45–165)
IRON SATN MFR SERPL: 70 % — HIGH (ref 16–55)
KETONES UR-MCNC: NEGATIVE — SIGNIFICANT CHANGE UP
LEUKOCYTE ESTERASE UR-ACNC: NEGATIVE — SIGNIFICANT CHANGE UP
MCHC RBC-ENTMCNC: 29.2 PG — SIGNIFICANT CHANGE UP (ref 27–34)
MCHC RBC-ENTMCNC: 29.2 PG — SIGNIFICANT CHANGE UP (ref 27–34)
MCHC RBC-ENTMCNC: 33.1 GM/DL — SIGNIFICANT CHANGE UP (ref 32–36)
MCHC RBC-ENTMCNC: 33.2 GM/DL — SIGNIFICANT CHANGE UP (ref 32–36)
MCV RBC AUTO: 88 FL — SIGNIFICANT CHANGE UP (ref 80–100)
MCV RBC AUTO: 88.3 FL — SIGNIFICANT CHANGE UP (ref 80–100)
NITRITE UR-MCNC: NEGATIVE — SIGNIFICANT CHANGE UP
NRBC # BLD: 0 /100 WBCS — SIGNIFICANT CHANGE UP (ref 0–0)
NRBC # BLD: 0 /100 WBCS — SIGNIFICANT CHANGE UP (ref 0–0)
OB PNL STL: POSITIVE
PH UR: 7 — SIGNIFICANT CHANGE UP (ref 5–8)
PLATELET # BLD AUTO: 274 K/UL — SIGNIFICANT CHANGE UP (ref 150–400)
PLATELET # BLD AUTO: 308 K/UL — SIGNIFICANT CHANGE UP (ref 150–400)
POTASSIUM SERPL-MCNC: 4.4 MMOL/L — SIGNIFICANT CHANGE UP (ref 3.5–5.3)
POTASSIUM SERPL-SCNC: 4.4 MMOL/L — SIGNIFICANT CHANGE UP (ref 3.5–5.3)
PROT SERPL-MCNC: 5.8 G/DL — LOW (ref 6–8.3)
PROT UR-MCNC: NEGATIVE — SIGNIFICANT CHANGE UP
RBC # BLD: 2.74 M/UL — LOW (ref 4.2–5.8)
RBC # BLD: 2.81 M/UL — LOW (ref 4.2–5.8)
RBC # FLD: 14.4 % — SIGNIFICANT CHANGE UP (ref 10.3–14.5)
RBC # FLD: 14.9 % — HIGH (ref 10.3–14.5)
SODIUM SERPL-SCNC: 142 MMOL/L — SIGNIFICANT CHANGE UP (ref 135–145)
SP GR SPEC: 1 — LOW (ref 1.01–1.02)
TIBC SERPL-MCNC: 322 UG/DL — SIGNIFICANT CHANGE UP (ref 220–430)
TROPONIN I SERPL-MCNC: 1.02 NG/ML — HIGH (ref 0.01–0.04)
UIBC SERPL-MCNC: 98 UG/DL — LOW (ref 110–370)
UROBILINOGEN FLD QL: NEGATIVE — SIGNIFICANT CHANGE UP
WBC # BLD: 11.81 K/UL — HIGH (ref 3.8–10.5)
WBC # BLD: 12.23 K/UL — HIGH (ref 3.8–10.5)
WBC # FLD AUTO: 11.81 K/UL — HIGH (ref 3.8–10.5)
WBC # FLD AUTO: 12.23 K/UL — HIGH (ref 3.8–10.5)

## 2018-09-07 PROCEDURE — 74176 CT ABD & PELVIS W/O CONTRAST: CPT | Mod: 26

## 2018-09-07 PROCEDURE — 88305 TISSUE EXAM BY PATHOLOGIST: CPT | Mod: 26

## 2018-09-07 PROCEDURE — 88313 SPECIAL STAINS GROUP 2: CPT | Mod: 26

## 2018-09-07 PROCEDURE — 88312 SPECIAL STAINS GROUP 1: CPT | Mod: 26

## 2018-09-07 PROCEDURE — 99233 SBSQ HOSP IP/OBS HIGH 50: CPT

## 2018-09-07 RX ORDER — PIPERACILLIN AND TAZOBACTAM 4; .5 G/20ML; G/20ML
3.38 INJECTION, POWDER, LYOPHILIZED, FOR SOLUTION INTRAVENOUS ONCE
Qty: 0 | Refills: 0 | Status: COMPLETED | OUTPATIENT
Start: 2018-09-07 | End: 2018-09-07

## 2018-09-07 RX ORDER — PIPERACILLIN AND TAZOBACTAM 4; .5 G/20ML; G/20ML
3.38 INJECTION, POWDER, LYOPHILIZED, FOR SOLUTION INTRAVENOUS EVERY 8 HOURS
Qty: 0 | Refills: 0 | Status: DISCONTINUED | OUTPATIENT
Start: 2018-09-07 | End: 2018-09-08

## 2018-09-07 RX ADMIN — SERTRALINE 50 MILLIGRAM(S): 25 TABLET, FILM COATED ORAL at 12:49

## 2018-09-07 RX ADMIN — Medication 25 MILLIGRAM(S): at 05:38

## 2018-09-07 RX ADMIN — MIRTAZAPINE 30 MILLIGRAM(S): 45 TABLET, ORALLY DISINTEGRATING ORAL at 21:20

## 2018-09-07 RX ADMIN — Medication 100 MILLIGRAM(S): at 17:30

## 2018-09-07 RX ADMIN — Medication 200 MILLIGRAM(S): at 17:30

## 2018-09-07 RX ADMIN — Medication 325 MILLIGRAM(S): at 17:30

## 2018-09-07 RX ADMIN — PIPERACILLIN AND TAZOBACTAM 25 GRAM(S): 4; .5 INJECTION, POWDER, LYOPHILIZED, FOR SOLUTION INTRAVENOUS at 09:20

## 2018-09-07 RX ADMIN — Medication 100 MILLIGRAM(S): at 05:38

## 2018-09-07 RX ADMIN — TAMSULOSIN HYDROCHLORIDE 0.4 MILLIGRAM(S): 0.4 CAPSULE ORAL at 21:20

## 2018-09-07 RX ADMIN — Medication 325 MILLIGRAM(S): at 05:38

## 2018-09-07 RX ADMIN — Medication 200 MILLIGRAM(S): at 05:38

## 2018-09-07 RX ADMIN — Medication 1 SPRAY(S): at 17:30

## 2018-09-07 RX ADMIN — Medication 25 MILLIGRAM(S): at 17:30

## 2018-09-07 RX ADMIN — Medication 1 SPRAY(S): at 05:38

## 2018-09-07 RX ADMIN — PIPERACILLIN AND TAZOBACTAM 25 GRAM(S): 4; .5 INJECTION, POWDER, LYOPHILIZED, FOR SOLUTION INTRAVENOUS at 16:26

## 2018-09-07 RX ADMIN — SIMVASTATIN 20 MILLIGRAM(S): 20 TABLET, FILM COATED ORAL at 21:20

## 2018-09-07 NOTE — PROGRESS NOTE ADULT - SUBJECTIVE AND OBJECTIVE BOX
Patient is a 67y old  Male who presents with a chief complaint of heat stroke, weakness, disoriented (06 Sep 2018 20:28)      INTERVAL HPI/OVERNIGHT EVENTS:  T(C): 37.8 (09-07-18 @ 04:30), Max: 37.9 (09-06-18 @ 23:03)  HR: 99 (09-07-18 @ 04:30) (80 - 114)  BP: 120/72 (09-07-18 @ 04:30) (103/64 - 150/80)  RR: 17 (09-07-18 @ 04:30) (14 - 19)  SpO2: 100% (09-07-18 @ 04:30) (99% - 100%)  Wt(kg): --  I&O's Summary    06 Sep 2018 07:01  -  07 Sep 2018 07:00  --------------------------------------------------------  IN: 1016 mL / OUT: 975 mL / NET: 41 mL        LABS:                        4.5    16.17 )-----------( 270      ( 06 Sep 2018 11:04 )             14.3     09-07    142  |  110<H>  |  19  ----------------------------<  121<H>  4.4   |  26  |  0.99    Ca    8.3<L>      07 Sep 2018 06:31    TPro  5.8<L>  /  Alb  2.9<L>  /  TBili  0.3  /  DBili  x   /  AST  28  /  ALT  37  /  AlkPhos  62  09-07    PT/INR - ( 06 Sep 2018 11:04 )   PT: 12.0 sec;   INR: 1.10 ratio         PTT - ( 06 Sep 2018 11:04 )  PTT:29.1 sec    CAPILLARY BLOOD GLUCOSE                MEDICATIONS  (STANDING):  docusate sodium 100 milliGRAM(s) Oral two times a day  ferrous    sulfate 325 milliGRAM(s) Oral two times a day  fluticasone propionate 50 MICROgram(s)/spray Nasal Spray 1 Spray(s) Both Nostrils two times a day  influenza   Vaccine 0.5 milliLiter(s) IntraMuscular once  metoprolol tartrate 25 milliGRAM(s) Oral two times a day  mirtazapine 30 milliGRAM(s) Oral at bedtime  phenytoin   Capsule 200 milliGRAM(s) Oral two times a day  piperacillin/tazobactam IVPB. 3.375 Gram(s) IV Intermittent every 8 hours  sertraline 50 milliGRAM(s) Oral daily  simvastatin 20 milliGRAM(s) Oral at bedtime  tamsulosin 0.4 milliGRAM(s) Oral at bedtime    MEDICATIONS  (PRN):  acetaminophen   Tablet .. 650 milliGRAM(s) Oral every 6 hours PRN Mild Pain (1 - 3)  ondansetron Injectable 4 milliGRAM(s) IV Push every 6 hours PRN Nausea          PHYSICAL EXAM:  GENERAL: NAD, well-groomed, well-developed  HEAD:  Atraumatic, Normocephalic  CHEST/LUNG: Clear to percussion bilaterally; No rales, rhonchi, wheezing, or rubs  HEART: Regular rate and rhythm; No murmurs, rubs, or gallops  ABDOMEN: Soft, Nontender, Nondistended; Bowel sounds present  EXTREMITIES:  2+ Peripheral Pulses, No clubbing, cyanosis, or edema  LYMPH: No lymphadenopathy noted  SKIN: No rashes or lesions    Care Discussed with Consultants/Other Providers [+ ] YES  [ ] NO

## 2018-09-07 NOTE — CONSULT NOTE ADULT - SUBJECTIVE AND OBJECTIVE BOX
Came to see patient for evaluation of anemia. Patient is in Endoscopy. Anemia likely due to acute on chronic blood loss.  Will order anemia work up.   Continue to monitor H/H and transfuse prn.  Dr. Saha will see patient tomorrow.

## 2018-09-07 NOTE — CONSULT NOTE ADULT - SUBJECTIVE AND OBJECTIVE BOX
HPI:  67y M hx of alcohol abuse, anemia never required blood transfusion, hx hemorrhagic CVA who  presented to the hospital with weakness and fall. Had syncopal episode. Also with chest pain.   Denies fever chills n/v/d. Denies cough SOB. No urinary symptoms. In ED found to have severe  anemia ( h/h 4/14). Also with WBC 16K and low grade temps. Pt denies melanotic stools or   BRBPR. On aspirin. + fever Tmax 100.3. No recent travel or sick contacts.    Infectious Disease consult was called to evaluate pt due to fever and leukocytosis/      Past Medical & Surgical Hx:  PAST MEDICAL & SURGICAL HISTORY:  Alcohol abuse: hx of etoh abuse  HTN (hypertension)  Hyponatremia: 124 in June 2014  FTT (failure to thrive) in adult: 6/2014  Seizure: 6/2014- abnormal eeg  Anemia  Retention of urine: 6/2014  UTI (lower urinary tract infection): mssa  Depression  CVA (cerebral infarction): 6/2014- small rt ant.cerebral artery subacute infarct  Backache: spinal stenosis  Retinal arterial branch occlusion, right: vision loss right eye    Social History--  EtOH: denies  Tobacco: denies  Drug Use: denies       FAMILY HISTORY:  Family history unknown      Allergies  No Known Allergies    Home Medications:  aspirin 325 mg oral tablet: 1 tab(s) orally once a day (06 Sep 2018 10:17)  azithromycin 250 mg oral tablet: 1 tab(s) orally 2 times a day (06 Sep 2018 10:17)  docusate sodium 100 mg oral capsule: 1 cap(s) orally 2 times a day (06 Sep 2018 10:17)  ferrous sulfate: 325 milligram(s) orally 2 times a day (06 Sep 2018 10:17)  fluticasone 50 mcg/inh nasal spray: 1 spray(s) nasal 2 times a day (06 Sep 2018 10:17)  metoprolol tartrate 25 mg oral tablet: 1 tab(s) orally 2 times a day (06 Sep 2018 10:17)  mirtazapine 30 mg oral tablet: 1 tab(s) orally once a day (at bedtime) (06 Sep 2018 10:17)  Multiple Vitamins oral tablet: 1 tab(s) orally once a day (06 Sep 2018 10:17)  phenytoin 200 mg oral capsule, extended release: 1 cap(s) orally every 12 hours (17 Jul 2014 14:33)  sertraline 50 mg oral tablet: 3 tab(s) orally once a day (17 Jul 2014 14:33)  simvastatin 20 mg oral tablet: 1 tab(s) orally once a day (at bedtime) (17 Jul 2014 14:33)  tamsulosin 0.4 mg oral capsule: 1 cap(s) orally once a day (17 Jul 2014 14:33)    Current Inpatient Medications :    ANTIBIOTICS:   piperacillin/tazobactam IVPB. 3.375 Gram(s) IV Intermittent every 8 hours      OTHER RELEVANT MEDICATIONS :  acetaminophen   Tablet .. 650 milliGRAM(s) Oral every 6 hours PRN  docusate sodium 100 milliGRAM(s) Oral two times a day  ferrous    sulfate 325 milliGRAM(s) Oral two times a day  fluticasone propionate 50 MICROgram(s)/spray Nasal Spray 1 Spray(s) Both Nostrils two times a day  influenza   Vaccine 0.5 milliLiter(s) IntraMuscular once  metoprolol tartrate 25 milliGRAM(s) Oral two times a day  mirtazapine 30 milliGRAM(s) Oral at bedtime  ondansetron Injectable 4 milliGRAM(s) IV Push every 6 hours PRN  phenytoin   Capsule 200 milliGRAM(s) Oral two times a day  sertraline 50 milliGRAM(s) Oral daily  simvastatin 20 milliGRAM(s) Oral at bedtime  tamsulosin 0.4 milliGRAM(s) Oral at bedtime      ROS:  CONSTITUTIONAL:  +fever no chills,  EYES:  Negative  blurry vision or double vision  CARDIOVASCULAR:  Negative for chest pain or palpitations  RESPIRATORY:  Negative for cough, wheezing, or SOB   GASTROINTESTINAL:  Negative for nausea, vomiting, diarrhea, constipation, or abdominal pain  GENITOURINARY:  Negative frequency, urgency , dysuria or hematuria   NEUROLOGIC:  No headache, confusion, dizziness, lightheadedness  All other systems were reviewed and are negative      I&O's Detail    06 Sep 2018 07:01  -  07 Sep 2018 07:00  --------------------------------------------------------  IN:    Oral Fluid: 180 mL    Packed Red Blood Cells: 836 mL  Total IN: 1016 mL    OUT:    Voided: 975 mL  Total OUT: 975 mL    Total NET: 41 mL      07 Sep 2018 07:01  -  07 Sep 2018 14:10  --------------------------------------------------------  IN:    Solution: 100 mL  Total IN: 100 mL    OUT:    Stool: 1 mL    Voided: 300 mL  Total OUT: 301 mL    Total NET: -201 mL          Physical Exam:  Vital Signs Last 24 Hrs  T(C): 37.2 (07 Sep 2018 13:20), Max: 37.9 (06 Sep 2018 23:03)  T(F): 98.9 (07 Sep 2018 13:20), Max: 100.3 (06 Sep 2018 23:03)  HR: 91 (07 Sep 2018 13:20) (77 - 114)  BP: 161/79 (07 Sep 2018 13:20) (103/64 - 161/79)  BP(mean): --  RR: 19 (07 Sep 2018 13:20) (15 - 19)  SpO2: 98% (07 Sep 2018 13:20) (97% - 100%)      General: well developed well nourished, in no acute distress  Eyes: sclera anicteric, pupils equal and reactive to light  ENMT: buccal mucosa moist, pharynx not injected  Neck: supple, trachea midline  Lungs: clear, no wheeze/rhonchi  Cardiovascular: regular rate and rhythm, S1 S2  Abdomen: soft, nontender, no organomegaly present, bowel sounds normal  Neurological:  alert and oriented x3, Cranial Nerves II-XII grossly intact  Skin:no increased ecchymosis/petechiae/purpura  Lymph Nodes: no palpable cervical/supraclavicular lymph nodes enlargements  Extremities: no cyanosis/clubbing/edema    Labs:       Complete Blood Count + Automated Diff (09.06.18 @ 11:04)    WBC Count: 16.17 K/uL    RBC Count: 1.59 M/uL    Hemoglobin: 4.5 g/dL    Hematocrit: 14.3 %    Mean Cell Volume: 89.9 fl    Mean Cell Hemoglobin: 28.3 pg    Mean Cell Hemoglobin Conc: 31.5 gm/dL    Red Cell Distrib Width: 13.2 %    Platelet Count - Automated: 270 K/uL    Auto Neutrophil #: 13.35 K/uL    Auto Lymphocyte #: 1.48 K/uL    Auto Monocyte #: 1.05 K/uL    Auto Eosinophil #: 0.02 K/uL    Auto Basophil #: 0.02 K/uL    Auto Neutrophil %: 82.6: Differential percentages must be correlated with absolute numbers for  clinical significance. %    Auto Lymphocyte %: 9.2 %    Auto Monocyte %: 6.5 %    Auto Eosinophil %: 0.1 %    Auto Basophil %: 0.1 %    Auto Immature Granulocyte %: 1.5 %                        8.0    12.23 )-----------( 274      ( 07 Sep 2018 06:31 )             24.1       RECENT CULTURES:  PENDING    RADIOLOGY & ADDITIONAL STUDIES:    EXAM:  CT ABDOMEN AND PELVIS                            PROCEDURE DATE:  09/07/2018          INTERPRETATION:  Clinical: History of anemia. Possible intra-abdominal   hemorrhage    Technique: CT tomographic sections of the abdomen were obtained and axial   views followed by MIP parasagittal and coronal reconstructions.  No oral   or IV contrast     Comparisons: CT abdomen pelvis dated 6/30/2014    Findings: No pleural fluid in the chest or ascites in the abdomen. The   visualized lower lungs areclear.   The liver and spleen appear normal in   size, shape and density. There are no focal masses or cysts.  The   gallbladder is normal.   There is no evidence of intrahepatic biliary   dilatation. The pancreas is unremarkable. A small hypodense nodule in the   left adrenal gland is unchanged. There is no evidence of retroperitoneal   lymphadenopathy. The kidneys are normal in size,  shape and density.   There is no evidence of obstructive uropathy or renal mass. 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 No renal calculi.   The aorta is normal in caliber and contour. No evidence of an aneurysm.     Scattered diverticula are present in the left colon. The terminal ileum   and appendix appear unremarkable..  There are no extracolonic fluid   collections or inflammatory changes.      Bone window examination demonstrates mild degenerative changes   consistent with age..  No evidence of osteolytic or osteoblastic bone   disease.     The prostate gland is normal in size, shape and density.. Coarse   calcifications are present in the prostate gland The inguinal areas are   normal..  No evidence of pelvic lymphadenopathy.   Urinary bladder is   normal in wall thickness, contour and density. Pelvic musculature appears  symmetrical.    Impression: No interval change.    Stable calcified right renal artery aneurysm.    Diverticulosis    Subcentimeter hypodense nodule left adrenal gland unchanged.      EXAM:  XR CHEST PORTABLE URGENT 1V                            PROCEDURE DATE:  09/06/2018          INTERPRETATION:  Chest pain.    AP chest. Prior 6/16/2014.    No change heart mediastinum. No pleural-parenchymal abnormality.    Impression: No active disease. Stable exam      Assessment :   67y M hx of alcohol abuse, anemia never required blood transfusion, hx hemorrhagic CVA who  presented with fall and syncopal episode found to have severe anemia  Rule out GIB  Fever and leukocytosis likely reactive  Doubt infected  Seen by GI  Also with + troponins    Plan :   Empiric antibiotic for now  Fu cultures  Trend temps and wbc  Trend H/H  If cultures neg dc antibx  For EGD and colonoscopy today    Continue with present regime .  Approptiate use of antibiotics and adverse effects reviewed.      I have discussed the above plan of care with patient/family in detail. They expressed understanding of the treatment plan . Risks, benefits and alternatives discussed in detail. I have asked if they have any questions or concerns and appropriately addressed them to the best of my ability .      > 45 minutes spent in direct patient care reviewing  the notes, lab data/ imaging , discussion with multidisciplinary team. All questions were addressed and answered to the best of my capacity .    Thank you for allowing me to participate in the care of your patient .      Gonzalo Yin MD  Infectious Disease  [phone]

## 2018-09-07 NOTE — PROGRESS NOTE ADULT - ASSESSMENT
67y old M PMH EtOH abuse, anemia, prior hemorhagic stroke 2014, HTN, seizure 2014 who presents with a chief complaint of weakness and syncope. Found to be anemic, with hemoglobin 4.5, troponins slightly elevated.     Elevated troponins likely demand ischemia due to significant anemia.    -cp overnight  -the recurring nature of this discomfort over time makes it somewhat less likely that his sxs are ischemic  -he certainly would be entitled to have symptoms of ischemia given his age and the severe anemia  -course of treatment for this would be primarily increased oxygen delivery (transfusion), not treatement of underlying occlusive disease  -antiplatelets contraindicated with severe anemia of unclear etiology  -trend in ce noted, elevated trop without elevation of ck  -would check tte for wall motion    -no arrhythmia    -no volume overload    -need fobt testing  -gi eval noted, ? for gi endoscopy     -all other orders per house staff  -will follow 67y old M PMH EtOH abuse, anemia, prior hemorhagic stroke 2014, HTN, seizure 2014 who presents with a chief complaint of weakness and syncope. Found to be anemic, with hemoglobin 4.5, troponins slightly elevated.     Elevated troponins likely demand ischemia due to significant anemia.    -cp overnight  -the recurring nature of this discomfort over time makes it somewhat less likely that his sxs are ischemic  -he certainly would be entitled to have symptoms of ischemia given his age and the severe anemia  -course of treatment for this would be primarily increased oxygen delivery (transfusion), not treatement of underlying occlusive disease  -antiplatelets contraindicated with severe anemia of unclear etiology  -trend in ce noted, elevated trop without elevation of ck  -would check tte for wall motion    -no arrhythmia    -no volume overload    -need fobt testing  -gi eval noted, ? for gi endoscopy  -if for egd would be considered optimized for this urgent but low risk procedure.  routine hemodynamic monitoring is recommended	     -all other orders per house staff  -will follow

## 2018-09-07 NOTE — PROGRESS NOTE ADULT - SUBJECTIVE AND OBJECTIVE BOX
[facility]    Jona Bush, Alix, Ghada, Jian, Jayy, Cindy      [phone]    CHIEF COMPLAINT: Patient is a 67y old  Male who presents with a chief complaint of Anemia (07 Sep 2018 07:48)      Follow Up: severe anemia, cp, pos trop    Interim history: had cp overnight, which he has had before provoked by emotional stress, never by activity. has had this intermittently for years. now feeling better    MEDICATIONS  (STANDING):  docusate sodium 100 milliGRAM(s) Oral two times a day  ferrous    sulfate 325 milliGRAM(s) Oral two times a day  fluticasone propionate 50 MICROgram(s)/spray Nasal Spray 1 Spray(s) Both Nostrils two times a day  influenza   Vaccine 0.5 milliLiter(s) IntraMuscular once  metoprolol tartrate 25 milliGRAM(s) Oral two times a day  mirtazapine 30 milliGRAM(s) Oral at bedtime  phenytoin   Capsule 200 milliGRAM(s) Oral two times a day  piperacillin/tazobactam IVPB. 3.375 Gram(s) IV Intermittent every 8 hours  sertraline 50 milliGRAM(s) Oral daily  simvastatin 20 milliGRAM(s) Oral at bedtime  tamsulosin 0.4 milliGRAM(s) Oral at bedtime    MEDICATIONS  (PRN):  acetaminophen   Tablet .. 650 milliGRAM(s) Oral every 6 hours PRN Mild Pain (1 - 3)  ondansetron Injectable 4 milliGRAM(s) IV Push every 6 hours PRN Nausea      REVIEW OF SYSTEMS:  eye, ent, GI, , allergic, dermatologic, musculoskeletal and neurologic are negative except as described above    Vital Signs Last 24 Hrs  T(C): 37.4 (07 Sep 2018 08:31), Max: 37.9 (06 Sep 2018 23:03)  T(F): 99.3 (07 Sep 2018 08:31), Max: 100.3 (06 Sep 2018 23:03)  HR: 77 (07 Sep 2018 08:31) (77 - 114)  BP: 127/84 (07 Sep 2018 08:31) (103/64 - 150/80)  BP(mean): --  RR: 18 (07 Sep 2018 08:31) (14 - 19)  SpO2: 97% (07 Sep 2018 08:31) (97% - 100%)    I&O's Summary    06 Sep 2018 07:01  -  07 Sep 2018 07:00  --------------------------------------------------------  IN: 1016 mL / OUT: 975 mL / NET: 41 mL    07 Sep 2018 07:01  -  07 Sep 2018 08:52  --------------------------------------------------------  IN: 0 mL / OUT: 0 mL / NET: 0 mL        Telemetry past 24h: sr    PHYSICAL EXAM:    Constitutional: well-nourished, well-developed, NAD   HEENT:  MMM, sclerae anicteric, conjunctivae clear, no oral cyanosis.  Pulmonary: Non-labored, breath sounds are clear bilaterally, No wheezing, rales or rhonchi  Cardiovascular: Regular, S1 and S2.  1/6 sys murmur.  No rubs, gallops or clicks  Gastrointestinal: Bowel Sounds present, soft, nontender.   Lymph: No peripheral edema.   Neurological: Alert, no focal deficits  Skin: No rashes.  Psych:  Mood & affect appropriate    LABS: All Labs Reviewed:                        4.5    16.17 )-----------( 270      ( 06 Sep 2018 11:04 )             14.3     07 Sep 2018 06:31    142    |  110    |  19     ----------------------------<  121    4.4     |  26     |  0.99   06 Sep 2018 11:04    138    |  107    |  41     ----------------------------<  132    4.2     |  22     |  1.30     Ca    8.3        07 Sep 2018 06:31  Ca    8.4        06 Sep 2018 11:04    TPro  5.8    /  Alb  2.9    /  TBili  0.3    /  DBili  x      /  AST  28     /  ALT  37     /  AlkPhos  62     07 Sep 2018 06:31  TPro  5.8    /  Alb  3.0    /  TBili  0.1    /  DBili  x      /  AST  16     /  ALT  17     /  AlkPhos  53     06 Sep 2018 11:04    PT/INR - ( 06 Sep 2018 11:04 )   PT: 12.0 sec;   INR: 1.10 ratio         PTT - ( 06 Sep 2018 11:04 )  PTT:29.1 sec  CARDIAC MARKERS ( 06 Sep 2018 23:41 )  1.020 ng/mL / x     / 76 U/L / x     / 3.1 ng/mL  CARDIAC MARKERS ( 06 Sep 2018 17:25 )  .442 ng/mL / x     / 79 U/L / x     / 3.5 ng/mL  CARDIAC MARKERS ( 06 Sep 2018 11:04 )  .164 ng/mL / x     / x     / x     / 2.2 ng/mL      Blood Culture:         RADIOLOGY:    EKG:    Echo:

## 2018-09-07 NOTE — PROGRESS NOTE ADULT - ASSESSMENT
67y M hx of alcohol abuse (denies drinking), anemia never required blood transfusion, prior hemorrhagic stroke here with generalized weakness, fatigue, exertional syncope. Pt  4-5 days ago walked into grocery store from parking lot and passed out in threshold. States did not hit head. States drank some fluids, declines EMS transport and went about his shopping, When he got home he walked back and forth to house w 4 loads of groceries and afterward "passed out" again on his bed, no head strike. States in last several days has felt generally weak, fatigue, dyspnea w minimal exertion. States he intermittently takes ASA "when he has a heart pain." States he has never had an EGD or colonoscopy.     Anemia due to acute blood loss.  Plan: unclear etiology  pt refusing guiac  transfused 3 units  gi consult appreciated  check CT abdomen.     Elevated troponin.  Plan: Likely demand ischemia  cards consult  cw home meds  hold asa.     Fever  check CT abdomen  ID consult  fu cultures  started zosyn

## 2018-09-08 LAB
ALBUMIN SERPL ELPH-MCNC: 3 G/DL — LOW (ref 3.3–5)
ALP SERPL-CCNC: 74 U/L — SIGNIFICANT CHANGE UP (ref 40–120)
ALT FLD-CCNC: 35 U/L — SIGNIFICANT CHANGE UP (ref 12–78)
ANION GAP SERPL CALC-SCNC: 8 MMOL/L — SIGNIFICANT CHANGE UP (ref 5–17)
AST SERPL-CCNC: 21 U/L — SIGNIFICANT CHANGE UP (ref 15–37)
BASOPHILS # BLD AUTO: 0.03 K/UL — SIGNIFICANT CHANGE UP (ref 0–0.2)
BASOPHILS NFR BLD AUTO: 0.3 % — SIGNIFICANT CHANGE UP (ref 0–2)
BILIRUB SERPL-MCNC: 0.4 MG/DL — SIGNIFICANT CHANGE UP (ref 0.2–1.2)
BUN SERPL-MCNC: 13 MG/DL — SIGNIFICANT CHANGE UP (ref 7–23)
CALCIUM SERPL-MCNC: 9.2 MG/DL — SIGNIFICANT CHANGE UP (ref 8.5–10.1)
CHLORIDE SERPL-SCNC: 105 MMOL/L — SIGNIFICANT CHANGE UP (ref 96–108)
CO2 SERPL-SCNC: 26 MMOL/L — SIGNIFICANT CHANGE UP (ref 22–31)
CREAT SERPL-MCNC: 1.1 MG/DL — SIGNIFICANT CHANGE UP (ref 0.5–1.3)
CULTURE RESULTS: NO GROWTH — SIGNIFICANT CHANGE UP
EOSINOPHIL # BLD AUTO: 0.16 K/UL — SIGNIFICANT CHANGE UP (ref 0–0.5)
EOSINOPHIL NFR BLD AUTO: 1.4 % — SIGNIFICANT CHANGE UP (ref 0–6)
FERRITIN SERPL-MCNC: 54 NG/ML — SIGNIFICANT CHANGE UP (ref 30–400)
FOLATE SERPL-MCNC: 14.6 NG/ML — SIGNIFICANT CHANGE UP
GLUCOSE SERPL-MCNC: 125 MG/DL — HIGH (ref 70–99)
HCT VFR BLD CALC: 26 % — LOW (ref 39–50)
HGB BLD-MCNC: 8.5 G/DL — LOW (ref 13–17)
IMM GRANULOCYTES NFR BLD AUTO: 1.1 % — SIGNIFICANT CHANGE UP (ref 0–1.5)
IRON SATN MFR SERPL: 27 UG/DL — LOW (ref 45–165)
IRON SATN MFR SERPL: 8 % — LOW (ref 16–55)
LDH SERPL L TO P-CCNC: 212 U/L — SIGNIFICANT CHANGE UP (ref 50–242)
LYMPHOCYTES # BLD AUTO: 1.26 K/UL — SIGNIFICANT CHANGE UP (ref 1–3.3)
LYMPHOCYTES # BLD AUTO: 10.8 % — LOW (ref 13–44)
MCHC RBC-ENTMCNC: 29.1 PG — SIGNIFICANT CHANGE UP (ref 27–34)
MCHC RBC-ENTMCNC: 32.7 GM/DL — SIGNIFICANT CHANGE UP (ref 32–36)
MCV RBC AUTO: 89 FL — SIGNIFICANT CHANGE UP (ref 80–100)
MONOCYTES # BLD AUTO: 1.03 K/UL — HIGH (ref 0–0.9)
MONOCYTES NFR BLD AUTO: 8.8 % — SIGNIFICANT CHANGE UP (ref 2–14)
NEUTROPHILS # BLD AUTO: 9.08 K/UL — HIGH (ref 1.8–7.4)
NEUTROPHILS NFR BLD AUTO: 77.6 % — HIGH (ref 43–77)
NRBC # BLD: 0 /100 WBCS — SIGNIFICANT CHANGE UP (ref 0–0)
PLATELET # BLD AUTO: 349 K/UL — SIGNIFICANT CHANGE UP (ref 150–400)
POTASSIUM SERPL-MCNC: 4.7 MMOL/L — SIGNIFICANT CHANGE UP (ref 3.5–5.3)
POTASSIUM SERPL-SCNC: 4.7 MMOL/L — SIGNIFICANT CHANGE UP (ref 3.5–5.3)
PROT SERPL-MCNC: 6.2 G/DL — SIGNIFICANT CHANGE UP (ref 6–8.3)
RBC # BLD: 2.92 M/UL — LOW (ref 4.2–5.8)
RBC # BLD: 2.92 M/UL — LOW (ref 4.2–5.8)
RBC # FLD: 14.6 % — HIGH (ref 10.3–14.5)
RETICS #: 165.9 K/UL — HIGH (ref 25–125)
RETICS/RBC NFR: 5.7 % — HIGH (ref 0.5–2.5)
SODIUM SERPL-SCNC: 139 MMOL/L — SIGNIFICANT CHANGE UP (ref 135–145)
SPECIMEN SOURCE: SIGNIFICANT CHANGE UP
TIBC SERPL-MCNC: 346 UG/DL — SIGNIFICANT CHANGE UP (ref 220–430)
TROPONIN I SERPL-MCNC: 0.47 NG/ML — HIGH (ref 0.01–0.04)
UIBC SERPL-MCNC: 319 UG/DL — SIGNIFICANT CHANGE UP (ref 110–370)
VIT B12 SERPL-MCNC: 1214 PG/ML — SIGNIFICANT CHANGE UP (ref 232–1245)
WBC # BLD: 11.69 K/UL — HIGH (ref 3.8–10.5)
WBC # FLD AUTO: 11.69 K/UL — HIGH (ref 3.8–10.5)

## 2018-09-08 PROCEDURE — 99233 SBSQ HOSP IP/OBS HIGH 50: CPT

## 2018-09-08 PROCEDURE — 93306 TTE W/DOPPLER COMPLETE: CPT | Mod: 26

## 2018-09-08 RX ADMIN — Medication 200 MILLIGRAM(S): at 06:00

## 2018-09-08 RX ADMIN — PIPERACILLIN AND TAZOBACTAM 25 GRAM(S): 4; .5 INJECTION, POWDER, LYOPHILIZED, FOR SOLUTION INTRAVENOUS at 08:05

## 2018-09-08 RX ADMIN — Medication 325 MILLIGRAM(S): at 18:17

## 2018-09-08 RX ADMIN — Medication 325 MILLIGRAM(S): at 06:00

## 2018-09-08 RX ADMIN — Medication 100 MILLIGRAM(S): at 06:00

## 2018-09-08 RX ADMIN — Medication 25 MILLIGRAM(S): at 06:00

## 2018-09-08 RX ADMIN — TAMSULOSIN HYDROCHLORIDE 0.4 MILLIGRAM(S): 0.4 CAPSULE ORAL at 21:43

## 2018-09-08 RX ADMIN — Medication 25 MILLIGRAM(S): at 18:17

## 2018-09-08 RX ADMIN — Medication 100 MILLIGRAM(S): at 18:17

## 2018-09-08 RX ADMIN — PIPERACILLIN AND TAZOBACTAM 25 GRAM(S): 4; .5 INJECTION, POWDER, LYOPHILIZED, FOR SOLUTION INTRAVENOUS at 00:15

## 2018-09-08 RX ADMIN — Medication 200 MILLIGRAM(S): at 18:17

## 2018-09-08 RX ADMIN — SERTRALINE 50 MILLIGRAM(S): 25 TABLET, FILM COATED ORAL at 11:26

## 2018-09-08 RX ADMIN — SIMVASTATIN 20 MILLIGRAM(S): 20 TABLET, FILM COATED ORAL at 21:43

## 2018-09-08 RX ADMIN — MIRTAZAPINE 30 MILLIGRAM(S): 45 TABLET, ORALLY DISINTEGRATING ORAL at 21:43

## 2018-09-08 RX ADMIN — Medication 1 SPRAY(S): at 05:59

## 2018-09-08 RX ADMIN — Medication 1 SPRAY(S): at 18:17

## 2018-09-08 NOTE — PROGRESS NOTE ADULT - ASSESSMENT
· Assessment		  67y M hx of alcohol abuse (denies drinking), anemia never required blood transfusion, prior hemorrhagic stroke here with generalized weakness, fatigue, exertional syncope. Pt states 4-5 days ago walked into grocery store from parking lot and passed out in threshold. States did not hit head. States drank some fluids, declines EMS transport and went about his shopping, When he got home he walked back and forth to house w 4 loads of groceries and afterward "passed out" again on his bed, no head strike. States in last several days has felt generally weak, fatigue, dyspnea w minimal exertion. States he intermittently takes ASA "when he has a heart pain." States he has never had an EGD or colonoscopy.     Anemia due to acute blood loss.  Plan: unclear etiology  pt refusing guiac  transfused 3 units  gi consult appreciated  CT abdomen reviewed    Elevated troponin.  Plan: Likely demand ischemia  cards consult  cw home meds  hold asa.     Fever  cw zosyn  ID consult  fu cultures

## 2018-09-08 NOTE — PROGRESS NOTE ADULT - SUBJECTIVE AND OBJECTIVE BOX
[facility]    Jona Bush, Alix, Ghada, Jian, Jayy, Cindy      [phone]    CHIEF COMPLAINT: Patient is a 67y old  Male who presents with a chief complaint of Anemia (07 Sep 2018 14:08)      Follow Up: anemia, gib, pos trop, cp    Interim history: The patient reports no new symptoms.  Persistent chest discomfort, "burning" without any real change.  No shortness of breath.  No abdominal pain.  No new neurologic symptoms.      MEDICATIONS  (STANDING):  docusate sodium 100 milliGRAM(s) Oral two times a day  ferrous    sulfate 325 milliGRAM(s) Oral two times a day  fluticasone propionate 50 MICROgram(s)/spray Nasal Spray 1 Spray(s) Both Nostrils two times a day  influenza   Vaccine 0.5 milliLiter(s) IntraMuscular once  metoprolol tartrate 25 milliGRAM(s) Oral two times a day  mirtazapine 30 milliGRAM(s) Oral at bedtime  phenytoin   Capsule 200 milliGRAM(s) Oral two times a day  piperacillin/tazobactam IVPB. 3.375 Gram(s) IV Intermittent every 8 hours  sertraline 50 milliGRAM(s) Oral daily  simvastatin 20 milliGRAM(s) Oral at bedtime  tamsulosin 0.4 milliGRAM(s) Oral at bedtime    MEDICATIONS  (PRN):  acetaminophen   Tablet .. 650 milliGRAM(s) Oral every 6 hours PRN Mild Pain (1 - 3)  ondansetron Injectable 4 milliGRAM(s) IV Push every 6 hours PRN Nausea      REVIEW OF SYSTEMS:  eye, ent, GI, , allergic, dermatologic, musculoskeletal and neurologic are negative except as described above    Vital Signs Last 24 Hrs  T(C): 37.3 (08 Sep 2018 11:04), Max: 38 (08 Sep 2018 04:00)  T(F): 99.2 (08 Sep 2018 11:04), Max: 100.4 (08 Sep 2018 04:00)  HR: 95 (08 Sep 2018 11:04) (88 - 100)  BP: 177/87 (08 Sep 2018 11:04) (124/75 - 177/87)  BP(mean): --  RR: 19 (08 Sep 2018 11:04) (15 - 19)  SpO2: 100% (08 Sep 2018 11:04) (97% - 100%)    I&O's Summary    07 Sep 2018 07:01  -  08 Sep 2018 07:00  --------------------------------------------------------  IN: 780 mL / OUT: 301 mL / NET: 479 mL        Telemetry past 24h: sr/st    PHYSICAL EXAM:    Constitutional: well-nourished, well-developed, NAD   HEENT:  MMM, sclerae anicteric, conjunctivae clear, no oral cyanosis.  Pulmonary: Non-labored, breath sounds are clear bilaterally, No wheezing, rales or rhonchi  Cardiovascular: Regular, S1 and S2.  No murmur.  No rubs, gallops or clicks  Gastrointestinal: Bowel Sounds present, soft, nontender.   Lymph: No peripheral edema.   Neurological: Alert, no focal deficits  Skin: No rashes.  Psych:  Mood & affect appropriate    LABS: All Labs Reviewed:                        8.5    11.69 )-----------( 349      ( 08 Sep 2018 06:36 )             26.0                         8.2    11.81 )-----------( 308      ( 07 Sep 2018 17:15 )             24.8                         8.0    12.23 )-----------( 274      ( 07 Sep 2018 06:31 )             24.1     08 Sep 2018 06:36    139    |  105    |  13     ----------------------------<  125    4.7     |  26     |  1.10   07 Sep 2018 06:31    142    |  110    |  19     ----------------------------<  121    4.4     |  26     |  0.99   06 Sep 2018 11:04    138    |  107    |  41     ----------------------------<  132    4.2     |  22     |  1.30     Ca    9.2        08 Sep 2018 06:36  Ca    8.3        07 Sep 2018 06:31  Ca    8.4        06 Sep 2018 11:04    TPro  6.2    /  Alb  3.0    /  TBili  0.4    /  DBili  x      /  AST  21     /  ALT  35     /  AlkPhos  74     08 Sep 2018 06:36  TPro  5.8    /  Alb  2.9    /  TBili  0.3    /  DBili  x      /  AST  28     /  ALT  37     /  AlkPhos  62     07 Sep 2018 06:31  TPro  5.8    /  Alb  3.0    /  TBili  0.1    /  DBili  x      /  AST  16     /  ALT  17     /  AlkPhos  53     06 Sep 2018 11:04      CARDIAC MARKERS ( 06 Sep 2018 23:41 )  1.020 ng/mL / x     / 76 U/L / x     / 3.1 ng/mL  CARDIAC MARKERS ( 06 Sep 2018 17:25 )  .442 ng/mL / x     / 79 U/L / x     / 3.5 ng/mL      Blood Culture: Organism --  Gram Stain Blood -- Gram Stain --  Specimen Source .Urine Clean Catch (Midstream)  Culture-Blood --    Organism --  Gram Stain Blood -- Gram Stain --  Specimen Source .Blood Blood  Culture-Blood --            RADIOLOGY:    EKG:    Echo:

## 2018-09-08 NOTE — PROGRESS NOTE ADULT - ASSESSMENT
67y old M PMH EtOH abuse, anemia, prior hemorhagic stroke 2014, HTN, seizure 2014 who presents with a chief complaint of weakness and syncope. Found to be anemic, with hemoglobin 4.5, troponins slightly elevated.     Elevated troponins likely demand ischemia due to significant anemia.    -cp overnight, which is persistent  -the recurring nature of this discomfort over time makes it somewhat less likely that his sxs are ischemic.  Given his gastritis and ulcer, seems more likely that his sxs are GI  -he certainly would be entitled to have symptoms of ischemia given his age and the severe anemia, however   -trend in ce noted, elevated trop without elevation of ck  -would repeat x 1 set to make sure that ce are falling  -echo preliminarily reveals a normal lvef    -no arrhythmia    -no volume overload    -gi followup for need for any addl testing  -will need colonoscopy      -all other orders per med  -will follow

## 2018-09-08 NOTE — PROGRESS NOTE ADULT - SUBJECTIVE AND OBJECTIVE BOX
VERNON FRY is a 67yMale , patient examined and chart reviewed.     INTERVAL HPI/ OVERNIGHT EVENTS:   Feeling better. Low grade temps.   Nontoxic.  Sp EGD colonoscopy yesterday.    PAST MEDICAL & SURGICAL HISTORY:  Alcohol abuse: hx of etoh abuse  HTN (hypertension)  Hyponatremia: 124 in 2014  FTT (failure to thrive) in adult: 2014  Seizure: 2014- abnormal eeg  Anemia  Retention of urine: 2014  UTI (lower urinary tract infection): mssa  Depression  CVA (cerebral infarction): 2014- small rt ant.cerebral artery subacute infarct  Backache: spinal stenosis  Retinal arterial branch occlusion, right: vision loss right eye      For details regarding the patient's social history, family history, and other miscellaneous elements, please refer the initial infectious diseases consultation and/or the admitting history and physical examination for this admission.    ROS:  CONSTITUTIONAL: + fever no chills, feels well, good appetite  EYES:  Negative  blurry vision or double vision  CARDIOVASCULAR:  Negative for chest pain or palpitations  RESPIRATORY:  Negative for cough, wheezing, or SOB   GASTROINTESTINAL:  Negative for nausea, vomiting, diarrhea, constipation, or abdominal pain  GENITOURINARY:  Negative frequency, urgency or dysuria  NEUROLOGIC:  No headache, confusion, dizziness, lightheadedness  All other systems were reviewed and are negative       Current inpatient medications :    ANTIBIOTICS/RELEVANT:  piperacillin/tazobactam IVPB. 3.375 Gram(s) IV Intermittent every 8 hours      acetaminophen   Tablet .. 650 milliGRAM(s) Oral every 6 hours PRN  docusate sodium 100 milliGRAM(s) Oral two times a day  ferrous    sulfate 325 milliGRAM(s) Oral two times a day  fluticasone propionate 50 MICROgram(s)/spray Nasal Spray 1 Spray(s) Both Nostrils two times a day  metoprolol tartrate 25 milliGRAM(s) Oral two times a day  mirtazapine 30 milliGRAM(s) Oral at bedtime  ondansetron Injectable 4 milliGRAM(s) IV Push every 6 hours PRN  phenytoin   Capsule 200 milliGRAM(s) Oral two times a day  sertraline 50 milliGRAM(s) Oral daily  simvastatin 20 milliGRAM(s) Oral at bedtime  tamsulosin 0.4 milliGRAM(s) Oral at bedtime      Objective:     @ 07:01  -   @ 07:00  --------------------------------------------------------  IN: 780 mL / OUT: 301 mL / NET: 479 mL      T(C): 37 (18 @ 15:59), Max: 38 (18 @ 04:00)  HR: 100 (18 @ 15:59) (88 - 100)  BP: 160/96 (18 @ 15:59) (124/75 - 177/87)  RR: 18 (18 @ 15:59) (17 - 20)  SpO2: 95% (18 @ 15:59) (95% - 100%)  Wt(kg): --      Physical Exam:  General: no acute distress  Eyes: sclera anicteric, pupils equal and reactive to light  ENMT: buccal mucosa moist, pharynx not injected  Neck: supple, trachea midline  Lungs: clear, no wheeze/rhonchi  Cardiovascular: regular rate and rhythm, S1 S2  Abdomen: soft, nontender, no organomegaly present, bowel sounds normal  Neurological: alert and oriented x3, Cranial Nerves II-XII grossly intact  Skin: no increased ecchymosis/petechiae/purpura  Lymph Nodes: no palpable cervical/supraclavicular lymph nodes enlargements  Extremities: no cyanosis/clubbing/edema      LABS:                          8.5    11.69 )-----------( 349      ( 08 Sep 2018 06:36 )             26.0           139  |  105  |  13  ----------------------------<  125<H>  4.7   |  26  |  1.10    Ca    9.2      08 Sep 2018 06:36    TPro  6.2  /  Alb  3.0<L>  /  TBili  0.4  /  DBili  x   /  AST  21  /  ALT  35  /  AlkPhos  74  08    Urinalysis Basic - ( 07 Sep 2018 11:47 )    Color: Yellow / Appearance: Clear / S.005 / pH: x  Gluc: x / Ketone: Negative  / Bili: Negative / Urobili: Negative   Blood: x / Protein: Negative / Nitrite: Negative   Leuk Esterase: Negative / RBC: x / WBC x   Sq Epi: x / Non Sq Epi: x / Bacteria: x      MICROBIOLOGY:    Culture - Urine (collected 07 Sep 2018 14:52)  Source: .Urine Clean Catch (Midstream)  Final Report (08 Sep 2018 10:17):    No growth    Culture - Blood (collected 07 Sep 2018 10:57)  Source: .Blood Blood  Preliminary Report (08 Sep 2018 11:01):    No growth to date.    Culture - Blood (collected 07 Sep 2018 10:57)  Source: .Blood Blood  Preliminary Report (08 Sep 2018 11:01):    No growth to date.    RADIOLOGY & ADDITIONAL STUDIES:  EXAM:  CT ABDOMEN AND PELVIS                            PROCEDURE DATE:  2018          INTERPRETATION:  Clinical: History of anemia. Possible intra-abdominal   hemorrhage    Technique: CT tomographic sections of the abdomen were obtained and axial   views followed by MIP parasagittal and coronal reconstructions.  No oral   or IV contrast     Comparisons: CT abdomen pelvis dated 2014    Findings: No pleural fluid in the chest or ascites in the abdomen. The   visualized lower lungs areclear.   The liver and spleen appear normal in   size, shape and density. There are no focal masses or cysts.  The   gallbladder is normal.   There is no evidence of intrahepatic biliary   dilatation. The pancreas is unremarkable. A small hypodense nodule in the   left adrenal gland is unchanged. There is no evidence of retroperitoneal   lymphadenopathy. The kidneys are normal in size,  shape and density.   There is no evidence of obstructive uropathy or renal mass. 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 No renal calculi.   The aorta is normal in caliber and contour. No evidence of an aneurysm.     Scattered diverticula are present in the left colon. The terminal ileum   and appendix appear unremarkable..  There are no extracolonic fluid   collections or inflammatory changes.      Bone window examination demonstrates mild degenerative changes   consistent with age..  No evidence of osteolytic or osteoblastic bone   disease.     The prostate gland is normal in size, shape and density.. Coarse   calcifications are present in the prostate gland The inguinal areas are   normal..  No evidence of pelvic lymphadenopathy.   Urinary bladder is   normal in wall thickness, contour and density. Pelvic musculature appears  symmetrical.    Impression: No interval change.    Stable calcified right renal artery aneurysm.    Diverticulosis    Subcentimeter hypodense nodule left adrenal gland unchanged.      EXAM:  XR CHEST PORTABLE URGENT 1V                            PROCEDURE DATE:  2018          INTERPRETATION:  Chest pain.    AP chest. Prior 2014.    No change heart mediastinum. No pleural-parenchymal abnormality.    Impression: No active disease. Stable exam      Assessment :   67y M hx of alcohol abuse, anemia never required blood transfusion, hx hemorrhagic CVA who  presented with fall and syncopal episode found to have severe anemia  Rule out GIB  Sp EGD and colonoscopy yesterday  Fever and leukocytosis likely reactive  Septic work up ngtd  Doubt infected  Seen by GI  Also with + troponins    Plan :   Dc Zosyn and monitor off antibiotics  Trend temps and wbc  Trend H/H  Increase activity    Continue with present regiment.  Appropriate use of antibiotics and adverse effects reviewed.      I have discussed the above plan of care with patient/ family in detail. They expressed understanding of the  treatment plan . Risks, benefits and alternatives discussed in detail. I have asked if they have any questions or concerns and appropriately addressed them to the best of my ability .    > 35 minutes were spent in direct patient care reviewing notes, medications ,labs data/ imaging , discussion with multidisciplinary team.    Thank you for allowing me to participate in care of your patient .    Gonzalo Yin MD  Infectious Disease  [phone]

## 2018-09-08 NOTE — PROGRESS NOTE ADULT - SUBJECTIVE AND OBJECTIVE BOX
67y Male     T(C): 37.7 (09-08-18 @ 12:07), Max: 38 (09-08-18 @ 04:00)  HR: 88 (09-08-18 @ 12:07) (88 - 100)  BP: 143/88 (09-08-18 @ 12:07) (124/75 - 177/87)  RR: 20 (09-08-18 @ 12:07) (15 - 20)  SpO2: 98% (09-08-18 @ 12:07) (97% - 100%)  Wt(kg): --    Pt seen, doing well, no anesthesia complications or complaints noted or reported.   No Nausea  Pain well controlled

## 2018-09-08 NOTE — PROGRESS NOTE ADULT - SUBJECTIVE AND OBJECTIVE BOX
Patient is a 67y old  Male who presents with a chief complaint of Anemia (08 Sep 2018 16:05)      INTERVAL HPI/OVERNIGHT EVENTS:  T(C): 37 (18 @ 15:59), Max: 38 (18 @ 04:00)  HR: 117 (18 @ 18:18) (88 - 117)  BP: 122/75 (18 @ 18:18) (122/75 - 177/87)  RR: 18 (18 @ 15:59) (18 - 20)  SpO2: 95% (18 @ 15:59) (95% - 100%)  Wt(kg): --  I&O's Summary    07 Sep 2018 07:01  -  08 Sep 2018 07:00  --------------------------------------------------------  IN: 780 mL / OUT: 301 mL / NET: 479 mL        LABS:                        8.5    11.69 )-----------( 349      ( 08 Sep 2018 06:36 )             26.0         139  |  105  |  13  ----------------------------<  125<H>  4.7   |  26  |  1.10    Ca    9.2      08 Sep 2018 06:36    TPro  6.2  /  Alb  3.0<L>  /  TBili  0.4  /  DBili  x   /  AST  21  /  ALT  35  /  AlkPhos  74  08      Urinalysis Basic - ( 07 Sep 2018 11:47 )    Color: Yellow / Appearance: Clear / S.005 / pH: x  Gluc: x / Ketone: Negative  / Bili: Negative / Urobili: Negative   Blood: x / Protein: Negative / Nitrite: Negative   Leuk Esterase: Negative / RBC: x / WBC x   Sq Epi: x / Non Sq Epi: x / Bacteria: x      CAPILLARY BLOOD GLUCOSE            Urinalysis Basic - ( 07 Sep 2018 11:47 )    Color: Yellow / Appearance: Clear / S.005 / pH: x  Gluc: x / Ketone: Negative  / Bili: Negative / Urobili: Negative   Blood: x / Protein: Negative / Nitrite: Negative   Leuk Esterase: Negative / RBC: x / WBC x   Sq Epi: x / Non Sq Epi: x / Bacteria: x        MEDICATIONS  (STANDING):  docusate sodium 100 milliGRAM(s) Oral two times a day  ferrous    sulfate 325 milliGRAM(s) Oral two times a day  fluticasone propionate 50 MICROgram(s)/spray Nasal Spray 1 Spray(s) Both Nostrils two times a day  influenza   Vaccine 0.5 milliLiter(s) IntraMuscular once  metoprolol tartrate 25 milliGRAM(s) Oral two times a day  mirtazapine 30 milliGRAM(s) Oral at bedtime  phenytoin   Capsule 200 milliGRAM(s) Oral two times a day  sertraline 50 milliGRAM(s) Oral daily  simvastatin 20 milliGRAM(s) Oral at bedtime  tamsulosin 0.4 milliGRAM(s) Oral at bedtime    MEDICATIONS  (PRN):  acetaminophen   Tablet .. 650 milliGRAM(s) Oral every 6 hours PRN Mild Pain (1 - 3)  ondansetron Injectable 4 milliGRAM(s) IV Push every 6 hours PRN Nausea          PHYSICAL EXAM:  GENERAL: NAD, well-groomed, well-developed  HEAD:  Atraumatic, Normocephalic  CHEST/LUNG: Clear to percussion bilaterally; No rales, rhonchi, wheezing, or rubs  HEART: Regular rate and rhythm; No murmurs, rubs, or gallops  ABDOMEN: Soft, Nontender, Nondistended; Bowel sounds present  EXTREMITIES:  2+ Peripheral Pulses, No clubbing, cyanosis, or edema  LYMPH: No lymphadenopathy noted  SKIN: No rashes or lesions    Care Discussed with Consultants/Other Providers [ ] YES  [ ] NO

## 2018-09-09 DIAGNOSIS — K26.9 DUODENAL ULCER, UNSPECIFIED AS ACUTE OR CHRONIC, WITHOUT HEMORRHAGE OR PERFORATION: ICD-10-CM

## 2018-09-09 LAB
ALBUMIN SERPL ELPH-MCNC: 3 G/DL — LOW (ref 3.3–5)
ALP SERPL-CCNC: 88 U/L — SIGNIFICANT CHANGE UP (ref 40–120)
ALT FLD-CCNC: 34 U/L — SIGNIFICANT CHANGE UP (ref 12–78)
ANION GAP SERPL CALC-SCNC: 8 MMOL/L — SIGNIFICANT CHANGE UP (ref 5–17)
AST SERPL-CCNC: 21 U/L — SIGNIFICANT CHANGE UP (ref 15–37)
BILIRUB SERPL-MCNC: 0.4 MG/DL — SIGNIFICANT CHANGE UP (ref 0.2–1.2)
BUN SERPL-MCNC: 15 MG/DL — SIGNIFICANT CHANGE UP (ref 7–23)
CALCIUM SERPL-MCNC: 8.9 MG/DL — SIGNIFICANT CHANGE UP (ref 8.5–10.1)
CHLORIDE SERPL-SCNC: 101 MMOL/L — SIGNIFICANT CHANGE UP (ref 96–108)
CO2 SERPL-SCNC: 25 MMOL/L — SIGNIFICANT CHANGE UP (ref 22–31)
CREAT SERPL-MCNC: 0.93 MG/DL — SIGNIFICANT CHANGE UP (ref 0.5–1.3)
GLUCOSE SERPL-MCNC: 137 MG/DL — HIGH (ref 70–99)
HCT VFR BLD CALC: 27.6 % — LOW (ref 39–50)
HGB BLD-MCNC: 9.1 G/DL — LOW (ref 13–17)
MCHC RBC-ENTMCNC: 29.3 PG — SIGNIFICANT CHANGE UP (ref 27–34)
MCHC RBC-ENTMCNC: 33 GM/DL — SIGNIFICANT CHANGE UP (ref 32–36)
MCV RBC AUTO: 88.7 FL — SIGNIFICANT CHANGE UP (ref 80–100)
NRBC # BLD: 0 /100 WBCS — SIGNIFICANT CHANGE UP (ref 0–0)
PLATELET # BLD AUTO: 394 K/UL — SIGNIFICANT CHANGE UP (ref 150–400)
POTASSIUM SERPL-MCNC: 3.9 MMOL/L — SIGNIFICANT CHANGE UP (ref 3.5–5.3)
POTASSIUM SERPL-SCNC: 3.9 MMOL/L — SIGNIFICANT CHANGE UP (ref 3.5–5.3)
PROT SERPL-MCNC: 6.5 G/DL — SIGNIFICANT CHANGE UP (ref 6–8.3)
RBC # BLD: 3.11 M/UL — LOW (ref 4.2–5.8)
RBC # FLD: 15.4 % — HIGH (ref 10.3–14.5)
SODIUM SERPL-SCNC: 134 MMOL/L — LOW (ref 135–145)
WBC # BLD: 11.75 K/UL — HIGH (ref 3.8–10.5)
WBC # FLD AUTO: 11.75 K/UL — HIGH (ref 3.8–10.5)

## 2018-09-09 PROCEDURE — 99232 SBSQ HOSP IP/OBS MODERATE 35: CPT

## 2018-09-09 RX ORDER — MORPHINE SULFATE 50 MG/1
2 CAPSULE, EXTENDED RELEASE ORAL ONCE
Qty: 0 | Refills: 0 | Status: DISCONTINUED | OUTPATIENT
Start: 2018-09-09 | End: 2018-09-09

## 2018-09-09 RX ORDER — PANTOPRAZOLE SODIUM 20 MG/1
40 TABLET, DELAYED RELEASE ORAL
Qty: 0 | Refills: 0 | Status: DISCONTINUED | OUTPATIENT
Start: 2018-09-09 | End: 2018-09-13

## 2018-09-09 RX ORDER — IRON SUCROSE 20 MG/ML
100 INJECTION, SOLUTION INTRAVENOUS EVERY 24 HOURS
Qty: 0 | Refills: 0 | Status: COMPLETED | OUTPATIENT
Start: 2018-09-09 | End: 2018-09-11

## 2018-09-09 RX ADMIN — TAMSULOSIN HYDROCHLORIDE 0.4 MILLIGRAM(S): 0.4 CAPSULE ORAL at 22:37

## 2018-09-09 RX ADMIN — Medication 100 MILLIGRAM(S): at 05:41

## 2018-09-09 RX ADMIN — MIRTAZAPINE 30 MILLIGRAM(S): 45 TABLET, ORALLY DISINTEGRATING ORAL at 22:37

## 2018-09-09 RX ADMIN — IRON SUCROSE 100 MILLIGRAM(S): 20 INJECTION, SOLUTION INTRAVENOUS at 10:59

## 2018-09-09 RX ADMIN — Medication 325 MILLIGRAM(S): at 05:41

## 2018-09-09 RX ADMIN — MORPHINE SULFATE 2 MILLIGRAM(S): 50 CAPSULE, EXTENDED RELEASE ORAL at 16:45

## 2018-09-09 RX ADMIN — MORPHINE SULFATE 2 MILLIGRAM(S): 50 CAPSULE, EXTENDED RELEASE ORAL at 16:33

## 2018-09-09 RX ADMIN — SIMVASTATIN 20 MILLIGRAM(S): 20 TABLET, FILM COATED ORAL at 22:37

## 2018-09-09 RX ADMIN — Medication 200 MILLIGRAM(S): at 05:41

## 2018-09-09 RX ADMIN — Medication 325 MILLIGRAM(S): at 17:45

## 2018-09-09 RX ADMIN — Medication 25 MILLIGRAM(S): at 17:45

## 2018-09-09 RX ADMIN — SERTRALINE 50 MILLIGRAM(S): 25 TABLET, FILM COATED ORAL at 11:00

## 2018-09-09 RX ADMIN — Medication 1 SPRAY(S): at 05:41

## 2018-09-09 RX ADMIN — Medication 25 MILLIGRAM(S): at 05:41

## 2018-09-09 RX ADMIN — Medication 200 MILLIGRAM(S): at 17:45

## 2018-09-09 RX ADMIN — Medication 1 SPRAY(S): at 17:45

## 2018-09-09 RX ADMIN — PANTOPRAZOLE SODIUM 40 MILLIGRAM(S): 20 TABLET, DELAYED RELEASE ORAL at 00:48

## 2018-09-09 RX ADMIN — Medication 100 MILLIGRAM(S): at 17:45

## 2018-09-09 NOTE — PROGRESS NOTE ADULT - SUBJECTIVE AND OBJECTIVE BOX
[facility] -- Jona Bush, Ghada Thomson Pannella, Patel, Savella  Office # [phone]    Follow Up:  CAD, Elevated troponins r/o ACS, syncope    Subjective/Observations: Denies CP or palpitations.  Denies SOB, PHILLIPS.  Denies weakness.  However, c/o "very bad" acid reflux after dinner last night which lasted almost the whole night.    REVIEW OF SYSTEMS: All other review of systems is negative unless indicated above    PAST MEDICAL & SURGICAL HISTORY:  Alcohol abuse: hx of etoh abuse  HTN (hypertension)  Hyponatremia: 124 in June 2014  FTT (failure to thrive) in adult: 6/2014  Seizure: 6/2014- abnormal eeg  Anemia  Retention of urine: 6/2014  UTI (lower urinary tract infection): mssa  Depression  CVA (cerebral infarction): 6/2014- small rt ant.cerebral artery subacute infarct  Backache: spinal stenosis  Retinal arterial branch occlusion, right: vision loss right eye    MEDICATIONS  (STANDING):  docusate sodium 100 milliGRAM(s) Oral two times a day  ferrous    sulfate 325 milliGRAM(s) Oral two times a day  fluticasone propionate 50 MICROgram(s)/spray Nasal Spray 1 Spray(s) Both Nostrils two times a day  influenza   Vaccine 0.5 milliLiter(s) IntraMuscular once  iron sucrose Injectable 100 milliGRAM(s) IV Push every 24 hours  metoprolol tartrate 25 milliGRAM(s) Oral two times a day  mirtazapine 30 milliGRAM(s) Oral at bedtime  pantoprazole    Tablet 40 milliGRAM(s) Oral before breakfast  phenytoin   Capsule 200 milliGRAM(s) Oral two times a day  sertraline 50 milliGRAM(s) Oral daily  simvastatin 20 milliGRAM(s) Oral at bedtime  tamsulosin 0.4 milliGRAM(s) Oral at bedtime    MEDICATIONS  (PRN):  acetaminophen   Tablet .. 650 milliGRAM(s) Oral every 6 hours PRN Mild Pain (1 - 3)  ondansetron Injectable 4 milliGRAM(s) IV Push every 6 hours PRN Nausea    Allergies    No Known Allergies    Intolerances    Vital Signs Last 24 Hrs  T(C): 37.6 (09 Sep 2018 08:00), Max: 37.7 (08 Sep 2018 12:07)  T(F): 99.7 (09 Sep 2018 08:00), Max: 99.8 (08 Sep 2018 12:07)  HR: 97 (09 Sep 2018 08:00) (88 - 117)  BP: 147/80 (09 Sep 2018 08:00) (118/76 - 177/87)  BP(mean): --  RR: 18 (09 Sep 2018 08:00) (18 - 20)  SpO2: 97% (09 Sep 2018 08:00) (95% - 100%)    I&O's Summary        PHYSICAL EXAM:  TELE:   Constitutional: NAD, awake and alert, well-developed  HEENT: Moist Mucous Membranes, Anicteric  Pulmonary: Non-labored, breath sounds are clear bilaterally, No wheezing, rales or rhonchi  Cardiovascular: Regular, S1 and S2, No murmurs, rubs, gallops or clicks  Gastrointestinal: Bowel Sounds present, soft, nontender.   Lymph: No peripheral edema. No lymphadenopathy.  Skin: No visible rashes or ulcers.  Psych:  Mood & affect appropriate    LABS: All Labs Reviewed:                        9.1    11.75 )-----------( 394      ( 09 Sep 2018 08:17 )             27.6                         8.5    11.69 )-----------( 349      ( 08 Sep 2018 06:36 )             26.0                         8.2    11.81 )-----------( 308      ( 07 Sep 2018 17:15 )             24.8     09 Sep 2018 08:17    134    |  101    |  15     ----------------------------<  137    3.9     |  25     |  0.93   08 Sep 2018 06:36    139    |  105    |  13     ----------------------------<  125    4.7     |  26     |  1.10   07 Sep 2018 06:31    142    |  110    |  19     ----------------------------<  121    4.4     |  26     |  0.99     Ca    8.9        09 Sep 2018 08:17  Ca    9.2        08 Sep 2018 06:36  Ca    8.3        07 Sep 2018 06:31    TPro  6.5    /  Alb  3.0    /  TBili  0.4    /  DBili  x      /  AST  21     /  ALT  34     /  AlkPhos  88     09 Sep 2018 08:17  TPro  6.2    /  Alb  3.0    /  TBili  0.4    /  DBili  x      /  AST  21     /  ALT  35     /  AlkPhos  74     08 Sep 2018 06:36  TPro  5.8    /  Alb  2.9    /  TBili  0.3    /  DBili  x      /  AST  28     /  ALT  37     /  AlkPhos  62     07 Sep 2018 06:31    CARDIAC MARKERS ( 08 Sep 2018 13:52 )  .468 ng/mL / x     / x     / x     / x        < from: Xray Chest 1 View- PORTABLE-Urgent (09.06.18 @ 11:26) >    EXAM:  XR CHEST PORTABLE URGENT 1V                            PROCEDURE DATE:  09/06/2018        INTERPRETATION:  Chest pain.    AP chest. Prior 6/16/2014.    No change heart mediastinum. No pleural-parenchymal abnormality.    Impression: No active disease. Stable exam    BRAXTON ROMO M.D., ATTENDING RADIOLOGIST  This document has been electronically signed. Sep  6 2018 11:34AM     < end of copied text > [facility] -- Jona Bush, Ghada Thomson Pannella, Patel, Savella  Office # [phone]    Follow Up:  CAD, Elevated troponins r/o ACS, syncope    Subjective/Observations: Denies CP or palpitations.  Denies SOB, PHILLIPS.  Denies weakness.  However, c/o "very bad" acid reflux after dinner last night which lasted almost the whole night.    REVIEW OF SYSTEMS: All other review of systems is negative unless indicated above    PAST MEDICAL & SURGICAL HISTORY:  Alcohol abuse: hx of etoh abuse  HTN (hypertension)  Hyponatremia: 124 in June 2014  FTT (failure to thrive) in adult: 6/2014  Seizure: 6/2014- abnormal eeg  Anemia  Retention of urine: 6/2014  UTI (lower urinary tract infection): mssa  Depression  CVA (cerebral infarction): 6/2014- small rt ant.cerebral artery subacute infarct  Backache: spinal stenosis  Retinal arterial branch occlusion, right: vision loss right eye    MEDICATIONS  (STANDING):  docusate sodium 100 milliGRAM(s) Oral two times a day  ferrous    sulfate 325 milliGRAM(s) Oral two times a day  fluticasone propionate 50 MICROgram(s)/spray Nasal Spray 1 Spray(s) Both Nostrils two times a day  influenza   Vaccine 0.5 milliLiter(s) IntraMuscular once  iron sucrose Injectable 100 milliGRAM(s) IV Push every 24 hours  metoprolol tartrate 25 milliGRAM(s) Oral two times a day  mirtazapine 30 milliGRAM(s) Oral at bedtime  pantoprazole    Tablet 40 milliGRAM(s) Oral before breakfast  phenytoin   Capsule 200 milliGRAM(s) Oral two times a day  sertraline 50 milliGRAM(s) Oral daily  simvastatin 20 milliGRAM(s) Oral at bedtime  tamsulosin 0.4 milliGRAM(s) Oral at bedtime    MEDICATIONS  (PRN):  acetaminophen   Tablet .. 650 milliGRAM(s) Oral every 6 hours PRN Mild Pain (1 - 3)  ondansetron Injectable 4 milliGRAM(s) IV Push every 6 hours PRN Nausea    Allergies    No Known Allergies    Intolerances    Vital Signs Last 24 Hrs  T(C): 37.6 (09 Sep 2018 08:00), Max: 37.7 (08 Sep 2018 12:07)  T(F): 99.7 (09 Sep 2018 08:00), Max: 99.8 (08 Sep 2018 12:07)  HR: 97 (09 Sep 2018 08:00) (88 - 117)  BP: 147/80 (09 Sep 2018 08:00) (118/76 - 177/87)  BP(mean): --  RR: 18 (09 Sep 2018 08:00) (18 - 20)  SpO2: 97% (09 Sep 2018 08:00) (95% - 100%)    I&O's Summary        PHYSICAL EXAM:  TELE:   Constitutional: NAD, awake and alert, well-developed  HEENT: Moist Mucous Membranes, Anicteric  Pulmonary: Non-labored, breath sounds are clear bilaterally, No wheezing, rales or rhonchi  Cardiovascular: Regular, S1 and S2, No murmurs, rubs, gallops or clicks  Gastrointestinal: Bowel Sounds present, soft, nontender.   Lymph: No peripheral edema. No lymphadenopathy.  Skin: No visible rashes or ulcers.  Psych:  Mood & affect appropriate    LABS: All Labs Reviewed:                        9.1    11.75 )-----------( 394      ( 09 Sep 2018 08:17 )             27.6                         8.5    11.69 )-----------( 349      ( 08 Sep 2018 06:36 )             26.0                         8.2    11.81 )-----------( 308      ( 07 Sep 2018 17:15 )             24.8     09 Sep 2018 08:17    134    |  101    |  15     ----------------------------<  137    3.9     |  25     |  0.93   08 Sep 2018 06:36    139    |  105    |  13     ----------------------------<  125    4.7     |  26     |  1.10   07 Sep 2018 06:31    142    |  110    |  19     ----------------------------<  121    4.4     |  26     |  0.99     Ca    8.9        09 Sep 2018 08:17  Ca    9.2        08 Sep 2018 06:36  Ca    8.3        07 Sep 2018 06:31    TPro  6.5    /  Alb  3.0    /  TBili  0.4    /  DBili  x      /  AST  21     /  ALT  34     /  AlkPhos  88     09 Sep 2018 08:17  TPro  6.2    /  Alb  3.0    /  TBili  0.4    /  DBili  x      /  AST  21     /  ALT  35     /  AlkPhos  74     08 Sep 2018 06:36  TPro  5.8    /  Alb  2.9    /  TBili  0.3    /  DBili  x      /  AST  28     /  ALT  37     /  AlkPhos  62     07 Sep 2018 06:31    CARDIAC MARKERS ( 08 Sep 2018 13:52 )  .468 ng/mL / x     / x     / x     / x        < from: Xray Chest 1 View- PORTABLE-Urgent (09.06.18 @ 11:26) >    EXAM:  XR CHEST PORTABLE URGENT 1V                            PROCEDURE DATE:  09/06/2018        INTERPRETATION:  Chest pain.    AP chest. Prior 6/16/2014.    No change heart mediastinum. No pleural-parenchymal abnormality.    Impression: No active disease. Stable exam    BRAXTON ROMO M.D., ATTENDING RADIOLOGIST  This document has been electronically signed. Sep  6 2018 11:34AM     < end of copied text >         < from: TTE Echo Doppler w/o Cont (09.08.18 @ 09:25) >     EXAM:  ECHO TTE W/O CON COMP W/DOPPLR         PROCEDURE DATE:  09/08/2018        INTERPRETATION:  INDICATION: Chest pain, syncope    Blood Pressure 156/68    Height 177.8     Weight 90       BSA 2.1    Dimensions:    LA 3.3       Normal Values: 2.0 - 4.0 cm    Ao 3.8        Normal Values: 2.0 - 3.8 cm  SEPTUM 1.1       Normal Values: 0.6 - 1.2 cm  PWT 1.1       Normal Values: 0.6 - 1.1 cm  LVIDd 4.5         Normal Values: 3.0 - 5.6 cm  LVIDs 2.8         Normal Values: 1.8 - 4.0 cm    Derived Variables:  LVMI     g/m2  RWT      Fractional Short      Ejection Fraction 65    Doppler Peak v. AoV=   (m/sec)    OBSERVATIONS:    Mitral Valve: normal, trace physiologic MR.  Aortic Valve/Aorta: Calcified trileaflet aortic valve.  Tricuspid Valve: normal with trace TR.  Pulmonic Valve: normal  Left Atrium: normal  Right Atrium: normal  Left Ventricle: normal LV size and systolic function, estimated LVEF of   65%.  Right Ventricle: normal size and systolic function.  Pericardium/Pleura: no significant pleural effusion, no significant   pericardial effusion. There is an anterior echo-free space, consistent   with pericardial fat.  Pulmonary/RV Pressure: estimated PA systolic pressure of 29 mmHg assuming   an RA pressure of 10 mmHg.  LV Diastolic Function:  E:E' is consistent with elevated left heart   filling pressure.    Normal left ventricular size and systolic function, estimated LVEF of   65%. Normal right ventricular size and systolic function. E:E' is   consistent with elevated left heart filling pressure. Normal biatrial   size. The aortic root is normal in size. The mitral valve is structurally   normal, trace physiologic MR. The aortic valve is calcified without   stenosis or insufficiency. Trace physiologic TR. Estimated PA systolic   pressure is 29 mm Hg, assuming an RA pressure of 10 mmHg. No significant   pericardial effusion. Anterior echo-free space, consistent with   pericardial fat.                  MYKE ARVIZU M.D., ATTENDING CARDIOLOGIST  This document has been electronically signed. Sep  8 2018  4:22PM                < end of copied text >

## 2018-09-09 NOTE — CHART NOTE - NSCHARTNOTEFT_GEN_A_CORE
Nurse called.   Pt admitted for GI bleed.   C/O chronic RLE radicular pain from LBP.   Attempted to call attending.  Given GI bleed, will avoid PO meds.  Morphine 2mg IV x 1  May need neuropathic medication once tolerating PO.

## 2018-09-09 NOTE — CONSULT NOTE ADULT - SUBJECTIVE AND OBJECTIVE BOX
Hematology/Oncology consult     Patient is seen and examined  notes/labs reviewed  pt family is at bedside and d/w family.  consult dictated,  Job #    contact #  son/daughter----  phone #    IMPRESSION:      RECOMMENDATION:              ,thanks for courtsey of this consult,will follow this pt with you for hem/onc issue while pt is in hospital. Hematology/Oncology consult     Patient is seen and examined  notes/labs reviewed  consult dictated,  Job # [id number]      IMPRESSION:  anemia  gi bleed  s/p prbc transfusion    RECOMMENDATION:  iv venofer  gi w/u as per pt/gi to r/o gi pathology/cancer    Dr viramontes  ,thanks for courtesy of this consult, will follow this pt with you for hem/onc issue while pt is in hospital.

## 2018-09-09 NOTE — PROGRESS NOTE ADULT - ASSESSMENT
67y old M PMH EtOH abuse, anemia, prior hemorhagic stroke 2014, HTN, seizure 2014 who presents with a chief complaint of weakness and syncope. Found to be profoundly anemic, with hemoglobin 4.5, troponins slightly elevated.     #1 Elevated Troponins/CAD  - Mildly elevated troponins but other cardiac biomarkers normal, not consistent of ACS.  It is likely due to demand ischemia from profound anemia   - No active signs of ischemia  - Patient has history of GERD, CP may likely be GI in etiology  - No arrhythmias on tele  - Patient has stable CAD, Continue BB and statin.  Okay to hold ASA for now until cleared by GI  - Follow up TTE official report  - May discontinue tele    #2 Syncope  - This is likely from his profound anemia with Hgb of 4.5 on admission  - Fall precautions  - Follow up TTE to assess for valvular pathology, although, examination is not consistent with any    #3 Profound anemia  - s/p PRBC's x 3 units total.  Monitor closely.  Remains stable post transfusion.  Transfuse prn to keep >7  - Follow GI recs  - Follow Heme/Onc recs    #4 GERD  - PPI  - Avoid aggravating factors.  Patient states he loves spicy foods  - Follow up GI recs    - Further cardiac workup as case unfolds  - Will continue to follow 67y old M PMH EtOH abuse, anemia, prior hemorhagic stroke 2014, HTN, seizure 2014 who presents with a chief complaint of weakness and syncope. Found to be profoundly anemic, with hemoglobin 4.5, troponins slightly elevated.     #1 Elevated Troponins/CAD  - Mildly elevated troponins but other cardiac biomarkers normal, not consistent of ACS.  It is likely due to demand ischemia from profound anemia   - No active signs of ischemia  - Patient has history of GERD, CP may likely be GI in etiology  - No arrhythmias on tele  - Patient has stable CAD, Continue BB and statin.  Okay to hold ASA for now until cleared by GI  - echo with normal lvef  - May discontinue tele    #2 Syncope  - This is likely from his profound anemia with Hgb of 4.5 on admission     #3 Profound anemia  - s/p PRBC's x 3 units total.  Monitor closely.  Remains stable post transfusion.  Transfuse prn to keep >7  - Follow GI recs  - Follow Heme/Onc recs  - hgb stable     #4 GERD  - PPI  - Avoid aggravating factors.  Patient states he loves spicy foods  - Follow up GI recs

## 2018-09-09 NOTE — PROGRESS NOTE ADULT - SUBJECTIVE AND OBJECTIVE BOX
[facility]  Amadeo Grace PA-C  [address] [place] [address]  [phone]      INTERVAL HPI/OVERNIGHT EVENTS:  my back hurts  i dont like this bed  my feet hurt  my stomach hurts     MEDICATIONS  (STANDING):  docusate sodium 100 milliGRAM(s) Oral two times a day  ferrous    sulfate 325 milliGRAM(s) Oral two times a day  fluticasone propionate 50 MICROgram(s)/spray Nasal Spray 1 Spray(s) Both Nostrils two times a day  influenza   Vaccine 0.5 milliLiter(s) IntraMuscular once  iron sucrose Injectable 100 milliGRAM(s) IV Push every 24 hours  metoprolol tartrate 25 milliGRAM(s) Oral two times a day  mirtazapine 30 milliGRAM(s) Oral at bedtime  pantoprazole    Tablet 40 milliGRAM(s) Oral before breakfast  phenytoin   Capsule 200 milliGRAM(s) Oral two times a day  sertraline 50 milliGRAM(s) Oral daily  simvastatin 20 milliGRAM(s) Oral at bedtime  tamsulosin 0.4 milliGRAM(s) Oral at bedtime    MEDICATIONS  (PRN):  acetaminophen   Tablet .. 650 milliGRAM(s) Oral every 6 hours PRN Mild Pain (1 - 3)  ondansetron Injectable 4 milliGRAM(s) IV Push every 6 hours PRN Nausea      Allergies    No Known Allergies    Intolerances        ROS:   General:  No wt loss, fevers, chills, night sweats, + fatigue,   Eyes:  Good vision, no reported pain  ENT:  No sore throat, pain, runny nose, dysphagia  CV:  No pain, palpitations, hypo/hypertension  Resp:  No dyspnea, cough, tachypnea, wheezing  GI:  + pain, No nausea, No vomiting, No diarrhea, No constipation, No weight loss, No fever, No pruritis, No rectal bleeding, No tarry stools, No dysphagia,  :  No pain, bleeding, incontinence, nocturia  Muscle:  No pain, weakness  Neuro:  No weakness, tingling, memory problems  Psych:  No fatigue, insomnia, mood problems, depression  Endocrine:  No polyuria, polydipsia, cold/heat intolerance  Heme:  No petechiae, ecchymosis, easy bruisability  Skin:  No rash, tattoos, scars, edema      PHYSICAL EXAM:   Vital Signs:  Vital Signs Last 24 Hrs  T(C): 37.3 (09 Sep 2018 11:45), Max: 37.6 (09 Sep 2018 08:00)  T(F): 99.2 (09 Sep 2018 11:45), Max: 99.7 (09 Sep 2018 08:00)  HR: 92 (09 Sep 2018 11:45) (92 - 117)  BP: 144/84 (09 Sep 2018 11:45) (118/76 - 160/96)  BP(mean): --  RR: 18 (09 Sep 2018 11:45) (18 - 19)  SpO2: 97% (09 Sep 2018 11:45) (95% - 97%)  Daily     Daily Weight in k.5 (09 Sep 2018 04:32)    GENERAL:  Appears stated age, well-groomed, well-nourished, no distress  HEENT:  NC/AT,  conjunctivae clear and pink, no thyromegaly, nodules, adenopathy, no JVD, sclera -anicteric  CHEST:  Full & symmetric excursion, no increased effort, breath sounds clear  HEART:  Regular rhythm, S1, S2, no murmur/rub/S3/S4, no abdominal bruit, no edema  ABDOMEN:  Soft, non-tender, non-distended, normoactive bowel sounds,  no masses ,no hepato-splenomegaly, no signs of chronic liver disease  EXTEREMITIES:  no cyanosis,clubbing or edema  SKIN:  No rash/erythema/ecchymoses/petechiae/wounds/abscess/warm/dry  NEURO:  Alert, oriented, no asterixis, no tremor, no encephalopathy      LABS:                        9.1    11.75 )-----------( 394      ( 09 Sep 2018 08:17 )             27.6         134<L>  |  101  |  15  ----------------------------<  137<H>  3.9   |  25  |  0.93    Ca    8.9      09 Sep 2018 08:17    TPro  6.5  /  Alb  3.0<L>  /  TBili  0.4  /  DBili  x   /  AST  21  /  ALT  34  /  AlkPhos  88  09-          RADIOLOGY & ADDITIONAL TESTS:

## 2018-09-09 NOTE — PROGRESS NOTE ADULT - ASSESSMENT
67y M hx of alcohol abuse (denies drinking), anemia never required blood transfusion, prior hemorrhagic stroke here with generalized weakness, fatigue, exertional syncope. Pt  4-5 days ago walked into grocery store from parking lot and passed out in threshold. States did not hit head. States drank some fluids, declines EMS transport and went about his shopping, When he got home he walked back and forth to house w 4 loads of groceries and afterward "passed out" again on his bed, no head strike. States in last several days has felt generally weak, fatigue, dyspnea w minimal exertion. States he intermittently takes ASA "when he has a heart pain." States he has never had an EGD or colonoscopy.     Anemia due to acute blood loss.  Plan: unclear etiology  guiac+  transfused 3 units  gi consult appreciated  CT abdomen reviewed    Elevated troponin.  Plan: Likely demand ischemia  cards consult  cw home meds  hold asa.     Fever  cw zosyn  ID consult  fu cultures

## 2018-09-09 NOTE — CHART NOTE - NSCHARTNOTEFT_GEN_A_CORE
Received call from RN for complaint of dyspepsia. Patient states that he has persistent reflux and dyspepsia after eating dinner last night. Has history of     CHART REVIEW:    Pt seen and examined at bedside    Vitals  T(F):  BP:  HR:  RR:  O2 Sat:    Physical exam  PHYSICAL EXAM:  Constitutional: NAD, awake and alert, well-developed  HEENT: PERR, EOMI, Normal Hearing, MMM  Neck: Soft and supple, No LAD, No JVD  Respiratory: Breath sounds are clear bilaterally, No wheezing, rales or rhonchi  Cardiovascular: S1 and S2, regular rate and rhythm, no Murmurs, gallops or rubs  Gastrointestinal: Bowel Sounds present, soft, nontender, nondistended, no guarding, no rebound  Extremities: No peripheral edema  Vascular: 2+ peripheral pulses  Neurological: A/O x 3, no focal deficits  Musculoskeletal: 5/5 strength b/l upper and lower extremities  Skin: No rashes      Assessment    Plan  - Discussed case w/RN who is aware and will contact MD w/further concerns should they arise    Discussed w/ Received call from RN to see patient. Patient states that he has persistent reflux and dyspepsia after eating dinner last night. Admits history of same at home for which he takes otc Zantac or tums. Patient seen on admission by GI for anemia and advised to start PPI. Pt seen and examined at bedside. Currently denies chest pain, palpitations, nausea, vomiting, fevers.    Vital Signs Last 24 Hrs  T(C): 36.7 (09 Sep 2018 00:13), Max: 38 (08 Sep 2018 04:00)  T(F): 98.1 (09 Sep 2018 00:13), Max: 100.4 (08 Sep 2018 04:00)  HR: 96 (09 Sep 2018 00:13) (88 - 117)  BP: 146/88 (09 Sep 2018 00:13) (118/76 - 177/87)  BP(mean): --  RR: 18 (09 Sep 2018 00:13) (18 - 20)  SpO2: 97% (09 Sep 2018 00:13) (95% - 100%)    Physical exam  PHYSICAL EXAM:  Constitutional: NAD, engaging in conversation  Respiratory: Breath sounds are clear bilaterally, No wheezing, rales or rhonchi  Cardiovascular: S1 and S2, regular rate and rhythm, no Murmurs  Gastrointestinal: Mild midline tenderness to palpation.    Assessment  Dyspepsia    Plan  Given stat dose of PPI, awaiting GI follow up  Further management per primary team in AM  Discussed case w/RN who is aware and will contact MD w/further concerns should they arise

## 2018-09-09 NOTE — PROGRESS NOTE ADULT - SUBJECTIVE AND OBJECTIVE BOX
Patient is a 67y old  Male who presents with a chief complaint of Anemia (09 Sep 2018 15:04)      INTERVAL HPI/OVERNIGHT EVENTS:  T(C): 37.4 (09-09-18 @ 16:05), Max: 37.6 (09-09-18 @ 08:00)  HR: 105 (09-09-18 @ 16:05) (92 - 117)  BP: 176/93 (09-09-18 @ 16:05) (118/76 - 176/93)  RR: 17 (09-09-18 @ 16:05) (17 - 19)  SpO2: 96% (09-09-18 @ 16:05) (95% - 97%)  Wt(kg): --  I&O's Summary      LABS:                        9.1    11.75 )-----------( 394      ( 09 Sep 2018 08:17 )             27.6     09-09    134<L>  |  101  |  15  ----------------------------<  137<H>  3.9   |  25  |  0.93    Ca    8.9      09 Sep 2018 08:17    TPro  6.5  /  Alb  3.0<L>  /  TBili  0.4  /  DBili  x   /  AST  21  /  ALT  34  /  AlkPhos  88  09-09        CAPILLARY BLOOD GLUCOSE                MEDICATIONS  (STANDING):  docusate sodium 100 milliGRAM(s) Oral two times a day  ferrous    sulfate 325 milliGRAM(s) Oral two times a day  fluticasone propionate 50 MICROgram(s)/spray Nasal Spray 1 Spray(s) Both Nostrils two times a day  influenza   Vaccine 0.5 milliLiter(s) IntraMuscular once  iron sucrose Injectable 100 milliGRAM(s) IV Push every 24 hours  metoprolol tartrate 25 milliGRAM(s) Oral two times a day  mirtazapine 30 milliGRAM(s) Oral at bedtime  morphine  - Injectable 2 milliGRAM(s) IV Push once  pantoprazole    Tablet 40 milliGRAM(s) Oral before breakfast  phenytoin   Capsule 200 milliGRAM(s) Oral two times a day  sertraline 50 milliGRAM(s) Oral daily  simvastatin 20 milliGRAM(s) Oral at bedtime  tamsulosin 0.4 milliGRAM(s) Oral at bedtime    MEDICATIONS  (PRN):  acetaminophen   Tablet .. 650 milliGRAM(s) Oral every 6 hours PRN Mild Pain (1 - 3)  ondansetron Injectable 4 milliGRAM(s) IV Push every 6 hours PRN Nausea          PHYSICAL EXAM:  GENERAL: NAD, well-groomed, well-developed  HEAD:  Atraumatic, Normocephalic  CHEST/LUNG: Clear to percussion bilaterally; No rales, rhonchi, wheezing, or rubs  HEART: Regular rate and rhythm; No murmurs, rubs, or gallops  ABDOMEN: Soft, Nontender, Nondistended; Bowel sounds present  EXTREMITIES:  2+ Peripheral Pulses, No clubbing, cyanosis, or edema  LYMPH: No lymphadenopathy noted  SKIN: No rashes or lesions    Care Discussed with Consultants/Other Providers [ ] YES  [ ] NO

## 2018-09-10 DIAGNOSIS — D64.9 ANEMIA, UNSPECIFIED: ICD-10-CM

## 2018-09-10 LAB
ALBUMIN SERPL ELPH-MCNC: 2.9 G/DL — LOW (ref 3.3–5)
ALP SERPL-CCNC: 103 U/L — SIGNIFICANT CHANGE UP (ref 40–120)
ALT FLD-CCNC: 38 U/L — SIGNIFICANT CHANGE UP (ref 12–78)
ANION GAP SERPL CALC-SCNC: 9 MMOL/L — SIGNIFICANT CHANGE UP (ref 5–17)
AST SERPL-CCNC: 21 U/L — SIGNIFICANT CHANGE UP (ref 15–37)
BILIRUB SERPL-MCNC: 0.3 MG/DL — SIGNIFICANT CHANGE UP (ref 0.2–1.2)
BUN SERPL-MCNC: 16 MG/DL — SIGNIFICANT CHANGE UP (ref 7–23)
CALCIUM SERPL-MCNC: 9.3 MG/DL — SIGNIFICANT CHANGE UP (ref 8.5–10.1)
CHLORIDE SERPL-SCNC: 102 MMOL/L — SIGNIFICANT CHANGE UP (ref 96–108)
CO2 SERPL-SCNC: 25 MMOL/L — SIGNIFICANT CHANGE UP (ref 22–31)
CREAT SERPL-MCNC: 1.2 MG/DL — SIGNIFICANT CHANGE UP (ref 0.5–1.3)
GLUCOSE SERPL-MCNC: 109 MG/DL — HIGH (ref 70–99)
HCT VFR BLD CALC: 29.2 % — LOW (ref 39–50)
HGB BLD-MCNC: 9.4 G/DL — LOW (ref 13–17)
MCHC RBC-ENTMCNC: 29.5 PG — SIGNIFICANT CHANGE UP (ref 27–34)
MCHC RBC-ENTMCNC: 32.2 GM/DL — SIGNIFICANT CHANGE UP (ref 32–36)
MCV RBC AUTO: 91.5 FL — SIGNIFICANT CHANGE UP (ref 80–100)
NRBC # BLD: 0 /100 WBCS — SIGNIFICANT CHANGE UP (ref 0–0)
PLATELET # BLD AUTO: 447 K/UL — HIGH (ref 150–400)
POTASSIUM SERPL-MCNC: 4.5 MMOL/L — SIGNIFICANT CHANGE UP (ref 3.5–5.3)
POTASSIUM SERPL-SCNC: 4.5 MMOL/L — SIGNIFICANT CHANGE UP (ref 3.5–5.3)
PROT SERPL-MCNC: 6.6 G/DL — SIGNIFICANT CHANGE UP (ref 6–8.3)
RBC # BLD: 3.19 M/UL — LOW (ref 4.2–5.8)
RBC # FLD: 16.4 % — HIGH (ref 10.3–14.5)
SODIUM SERPL-SCNC: 136 MMOL/L — SIGNIFICANT CHANGE UP (ref 135–145)
WBC # BLD: 9.58 K/UL — SIGNIFICANT CHANGE UP (ref 3.8–10.5)
WBC # FLD AUTO: 9.58 K/UL — SIGNIFICANT CHANGE UP (ref 3.8–10.5)

## 2018-09-10 PROCEDURE — 99232 SBSQ HOSP IP/OBS MODERATE 35: CPT

## 2018-09-10 RX ORDER — ACETAMINOPHEN 500 MG
650 TABLET ORAL ONCE
Qty: 0 | Refills: 0 | Status: DISCONTINUED | OUTPATIENT
Start: 2018-09-10 | End: 2018-09-13

## 2018-09-10 RX ORDER — SUCRALFATE 1 G
1 TABLET ORAL
Qty: 0 | Refills: 0 | Status: DISCONTINUED | OUTPATIENT
Start: 2018-09-10 | End: 2018-09-13

## 2018-09-10 RX ADMIN — Medication 650 MILLIGRAM(S): at 22:14

## 2018-09-10 RX ADMIN — Medication 325 MILLIGRAM(S): at 17:34

## 2018-09-10 RX ADMIN — Medication 100 MILLIGRAM(S): at 05:48

## 2018-09-10 RX ADMIN — Medication 200 MILLIGRAM(S): at 17:34

## 2018-09-10 RX ADMIN — SERTRALINE 50 MILLIGRAM(S): 25 TABLET, FILM COATED ORAL at 12:29

## 2018-09-10 RX ADMIN — SIMVASTATIN 20 MILLIGRAM(S): 20 TABLET, FILM COATED ORAL at 22:12

## 2018-09-10 RX ADMIN — Medication 25 MILLIGRAM(S): at 17:34

## 2018-09-10 RX ADMIN — Medication 25 MILLIGRAM(S): at 05:48

## 2018-09-10 RX ADMIN — Medication 1 GRAM(S): at 17:33

## 2018-09-10 RX ADMIN — Medication 1 SPRAY(S): at 05:49

## 2018-09-10 RX ADMIN — PANTOPRAZOLE SODIUM 40 MILLIGRAM(S): 20 TABLET, DELAYED RELEASE ORAL at 05:49

## 2018-09-10 RX ADMIN — MIRTAZAPINE 30 MILLIGRAM(S): 45 TABLET, ORALLY DISINTEGRATING ORAL at 22:12

## 2018-09-10 RX ADMIN — Medication 200 MILLIGRAM(S): at 05:49

## 2018-09-10 RX ADMIN — Medication 100 MILLIGRAM(S): at 17:33

## 2018-09-10 RX ADMIN — TAMSULOSIN HYDROCHLORIDE 0.4 MILLIGRAM(S): 0.4 CAPSULE ORAL at 22:13

## 2018-09-10 RX ADMIN — Medication 1 SPRAY(S): at 17:33

## 2018-09-10 RX ADMIN — IRON SUCROSE 100 MILLIGRAM(S): 20 INJECTION, SOLUTION INTRAVENOUS at 09:53

## 2018-09-10 RX ADMIN — Medication 325 MILLIGRAM(S): at 05:48

## 2018-09-10 NOTE — PROGRESS NOTE ADULT - SUBJECTIVE AND OBJECTIVE BOX
Patient is a 67y old  Male who presents with a chief complaint of Anemia (10 Sep 2018 16:00)      INTERVAL HPI/OVERNIGHT EVENTS:  T(C): 37.1 (09-10-18 @ 16:16), Max: 37.6 (09-10-18 @ 05:29)  HR: 99 (09-10-18 @ 16:16) (80 - 99)  BP: 154/73 (09-10-18 @ 16:16) (125/78 - 154/73)  RR: 19 (09-10-18 @ 16:16) (18 - 20)  SpO2: 97% (09-10-18 @ 16:16) (95% - 99%)  Wt(kg): --  I&O's Summary    10 Sep 2018 07:01  -  10 Sep 2018 18:04  --------------------------------------------------------  IN: 480 mL / OUT: 0 mL / NET: 480 mL        LABS:                        9.4    9.58  )-----------( 447      ( 10 Sep 2018 06:44 )             29.2     09-10    136  |  102  |  16  ----------------------------<  109<H>  4.5   |  25  |  1.20    Ca    9.3      10 Sep 2018 06:44    TPro  6.6  /  Alb  2.9<L>  /  TBili  0.3  /  DBili  x   /  AST  21  /  ALT  38  /  AlkPhos  103  09-10        CAPILLARY BLOOD GLUCOSE                MEDICATIONS  (STANDING):  docusate sodium 100 milliGRAM(s) Oral two times a day  ferrous    sulfate 325 milliGRAM(s) Oral two times a day  fluticasone propionate 50 MICROgram(s)/spray Nasal Spray 1 Spray(s) Both Nostrils two times a day  influenza   Vaccine 0.5 milliLiter(s) IntraMuscular once  iron sucrose Injectable 100 milliGRAM(s) IV Push every 24 hours  metoprolol tartrate 25 milliGRAM(s) Oral two times a day  mirtazapine 30 milliGRAM(s) Oral at bedtime  pantoprazole    Tablet 40 milliGRAM(s) Oral before breakfast  phenytoin   Capsule 200 milliGRAM(s) Oral two times a day  sertraline 50 milliGRAM(s) Oral daily  simvastatin 20 milliGRAM(s) Oral at bedtime  sucralfate 1 Gram(s) Oral two times a day  tamsulosin 0.4 milliGRAM(s) Oral at bedtime    MEDICATIONS  (PRN):  acetaminophen   Tablet .. 650 milliGRAM(s) Oral every 6 hours PRN Mild Pain (1 - 3)  ondansetron Injectable 4 milliGRAM(s) IV Push every 6 hours PRN Nausea          PHYSICAL EXAM:  GENERAL: NAD, well-groomed, well-developed  HEAD:  Atraumatic, Normocephalic  CHEST/LUNG: Clear to percussion bilaterally; No rales, rhonchi, wheezing, or rubs  HEART: Regular rate and rhythm; No murmurs, rubs, or gallops  ABDOMEN: Soft, Nontender, Nondistended; Bowel sounds present  EXTREMITIES:  2+ Peripheral Pulses, No clubbing, cyanosis, or edema  LYMPH: No lymphadenopathy noted  SKIN: No rashes or lesions    Care Discussed with Consultants/Other Providers [ ] YES  [ ] NO

## 2018-09-10 NOTE — PROGRESS NOTE ADULT - ASSESSMENT
Anemia due to acute blood loss.  Plan: unclear etiology  guiac+  transfused 3 units  gi consult appreciated  CT abdomen reviewed    Elevated troponin.  Plan: Likely demand ischemia  cards consult  cw home meds  hold asa.     Fever  cw zosyn  ID consult  fu cultures

## 2018-09-10 NOTE — PROGRESS NOTE ADULT - SUBJECTIVE AND OBJECTIVE BOX
[facility] -- Jona Bush, Ghada Thomson, Jayy Villar Savella  Office # [phone]      Follow Up:  jim CORTEZ    Subjective/Observations: Patient seen and examined. Events noted. Resting comfortably in bed. No complaints of chest pain, dyspnea, or palpitations reported. No signs of orthopnea or PND.       REVIEW OF SYSTEMS: All other review of systems is negative unless indicated above    PAST MEDICAL & SURGICAL HISTORY:  Alcohol abuse: hx of etoh abuse  HTN (hypertension)  Hyponatremia: 124 in June 2014  FTT (failure to thrive) in adult: 6/2014  Seizure: 6/2014- abnormal eeg  Anemia  Retention of urine: 6/2014  UTI (lower urinary tract infection): mssa  Depression  CVA (cerebral infarction): 6/2014- small rt ant.cerebral artery subacute infarct  Backache: spinal stenosis  Retinal arterial branch occlusion, right: vision loss right eye      MEDICATIONS  (STANDING):  docusate sodium 100 milliGRAM(s) Oral two times a day  ferrous    sulfate 325 milliGRAM(s) Oral two times a day  fluticasone propionate 50 MICROgram(s)/spray Nasal Spray 1 Spray(s) Both Nostrils two times a day  influenza   Vaccine 0.5 milliLiter(s) IntraMuscular once  iron sucrose Injectable 100 milliGRAM(s) IV Push every 24 hours  metoprolol tartrate 25 milliGRAM(s) Oral two times a day  mirtazapine 30 milliGRAM(s) Oral at bedtime  pantoprazole    Tablet 40 milliGRAM(s) Oral before breakfast  phenytoin   Capsule 200 milliGRAM(s) Oral two times a day  sertraline 50 milliGRAM(s) Oral daily  simvastatin 20 milliGRAM(s) Oral at bedtime  tamsulosin 0.4 milliGRAM(s) Oral at bedtime    MEDICATIONS  (PRN):  acetaminophen   Tablet .. 650 milliGRAM(s) Oral every 6 hours PRN Mild Pain (1 - 3)  ondansetron Injectable 4 milliGRAM(s) IV Push every 6 hours PRN Nausea      Allergies    No Known Allergies    Intolerances            Vital Signs Last 24 Hrs  T(C): 37.6 (10 Sep 2018 05:29), Max: 37.6 (10 Sep 2018 05:29)  T(F): 99.6 (10 Sep 2018 05:29), Max: 99.6 (10 Sep 2018 05:29)  HR: 97 (10 Sep 2018 05:29) (80 - 111)  BP: 133/80 (10 Sep 2018 05:29) (130/76 - 176/93)  BP(mean): --  RR: 18 (10 Sep 2018 05:29) (17 - 18)  SpO2: 97% (10 Sep 2018 05:29) (95% - 98%)    I&O's Summary    10 Sep 2018 07:01  -  10 Sep 2018 11:10  --------------------------------------------------------  IN: 240 mL / OUT: 0 mL / NET: 240 mL          PHYSICAL EXAM:  TELE: [facility]   Constitutional: NAD, awake and alert, well-developed  HEENT: Moist Mucous Membranes, Anicteric  Pulmonary: Decreased breath sounds b/l. No rales, crackles or wheeze appreciated.   Cardiovascular: Regular, S1 and S2, No murmurs, rubs, gallops or clicks  Gastrointestinal: Bowel Sounds present, soft, nontender.   Lymph: No peripheral edema. No lymphadenopathy.  Skin: No visible rashes or ulcers.  Psych:  Mood & affect appropriate for situation    LABS: All Labs Reviewed:                        9.4    9.58  )-----------( 447      ( 10 Sep 2018 06:44 )             29.2                         9.1    11.75 )-----------( 394      ( 09 Sep 2018 08:17 )             27.6                         8.5    11.69 )-----------( 349      ( 08 Sep 2018 06:36 )             26.0     10 Sep 2018 06:44    136    |  102    |  16     ----------------------------<  109    4.5     |  25     |  1.20   09 Sep 2018 08:17    134    |  101    |  15     ----------------------------<  137    3.9     |  25     |  0.93   08 Sep 2018 06:36    139    |  105    |  13     ----------------------------<  125    4.7     |  26     |  1.10     Ca    9.3        10 Sep 2018 06:44  Ca    8.9        09 Sep 2018 08:17  Ca    9.2        08 Sep 2018 06:36    TPro  6.6    /  Alb  2.9    /  TBili  0.3    /  DBili  x      /  AST  21     /  ALT  38     /  AlkPhos  103    10 Sep 2018 06:44  TPro  6.5    /  Alb  3.0    /  TBili  0.4    /  DBili  x      /  AST  21     /  ALT  34     /  AlkPhos  88     09 Sep 2018 08:17  TPro  6.2    /  Alb  3.0    /  TBili  0.4    /  DBili  x      /  AST  21     /  ALT  35     /  AlkPhos  74     08 Sep 2018 06:36      CARDIAC MARKERS ( 08 Sep 2018 13:52 )  .468 ng/mL / x     / x     / x     / x

## 2018-09-10 NOTE — PROGRESS NOTE ADULT - ASSESSMENT
patient is with anemia and gi bleed  seen by gi and is undergoing gi w/u to exclude gi pathology  patient is on iv iron

## 2018-09-10 NOTE — PROGRESS NOTE ADULT - ASSESSMENT
67y old M PMH EtOH abuse, anemia, prior hemorhagic stroke 2014, HTN, seizure 2014 who presents with a chief complaint of weakness and syncope. Found to be profoundly anemic, with hemoglobin 4.5, troponins slightly elevated.     #1 Elevated Troponins/CAD  - Mildly elevated troponins but other cardiac biomarkers normal, not consistent of ACS.  It is likely due to demand ischemia from profound anemia   - No active signs of ischemia  - Patient has history of GERD, CP may likely be GI in etiology  - No arrhythmias on tele  - Patient has stable CAD, Continue BB and statin.  Okay to hold ASA for now until cleared by GI  - echo with normal lvef  - May discontinue tele    #2 Syncope  - This is likely from his profound anemia with Hgb of 4.5 on admission     #3 Profound anemia  - s/p PRBC's x 3 units total.  Monitor closely.  Remains stable post transfusion.  Transfuse prn to keep >7  - Follow GI recs  - Follow Heme/Onc recs  - hgb stable     #4 GERD  - PPI  - Avoid aggravating factors.  Patient states he loves spicy foods  - Follow up GI recs    - Current optimized from a cardiac POV for possible colonoscopy. Routine hemodynamic monitoring is advised.   Further cardiac workup will depend on clinical course.   - All other workup per primary team. Will followup.

## 2018-09-10 NOTE — PROGRESS NOTE ADULT - SUBJECTIVE AND OBJECTIVE BOX
INTERVAL HPI/OVERNIGHT EVENTS:  pt seen and examined earlier this am  denies n/v/abd pain, reports non bloody bm yesterday  per overnight rn no acute gi issues, no s/s overt gib, slept most of night  labs noted afebrile overnight    MEDICATIONS  (STANDING):  docusate sodium 100 milliGRAM(s) Oral two times a day  ferrous    sulfate 325 milliGRAM(s) Oral two times a day  fluticasone propionate 50 MICROgram(s)/spray Nasal Spray 1 Spray(s) Both Nostrils two times a day  influenza   Vaccine 0.5 milliLiter(s) IntraMuscular once  iron sucrose Injectable 100 milliGRAM(s) IV Push every 24 hours  metoprolol tartrate 25 milliGRAM(s) Oral two times a day  mirtazapine 30 milliGRAM(s) Oral at bedtime  pantoprazole    Tablet 40 milliGRAM(s) Oral before breakfast  phenytoin   Capsule 200 milliGRAM(s) Oral two times a day  sertraline 50 milliGRAM(s) Oral daily  simvastatin 20 milliGRAM(s) Oral at bedtime  tamsulosin 0.4 milliGRAM(s) Oral at bedtime    MEDICATIONS  (PRN):  acetaminophen   Tablet .. 650 milliGRAM(s) Oral every 6 hours PRN Mild Pain (1 - 3)  ondansetron Injectable 4 milliGRAM(s) IV Push every 6 hours PRN Nausea      Allergies    No Known Allergies    Intolerances        Review of Systems:    General:  No wt loss, fevers, chills, night sweats, fatigue   Eyes:  Good vision, no reported pain  ENT:  No sore throat, pain, runny nose, dysphagia  CV:  No pain, palpitations, hypo/hypertension  Resp:  No dyspnea, cough, tachypnea, wheezing  GI:  No pain, No nausea, No vomiting, No diarrhea, No constipation, No weight loss, No fever, No pruritis, No rectal bleeding, No melena, No dysphagia  :  No pain, bleeding, incontinence, nocturia  Muscle:  No pain, weakness  Neuro:  No weakness, tingling, memory problems  Psych:  No fatigue, insomnia, mood problems, depression  Endocrine:  No polyuria, polydypsia, cold/heat intolerance  Heme:  No petechiae, ecchymosis, easy bruisability  Skin:  No rash, tattoos, scars, edema      Vital Signs Last 24 Hrs  T(C): 37.6 (10 Sep 2018 05:29), Max: 37.6 (10 Sep 2018 05:29)  T(F): 99.6 (10 Sep 2018 05:29), Max: 99.6 (10 Sep 2018 05:29)  HR: 97 (10 Sep 2018 05:29) (80 - 111)  BP: 133/80 (10 Sep 2018 05:29) (130/76 - 176/93)  BP(mean): --  RR: 18 (10 Sep 2018 05:29) (17 - 18)  SpO2: 97% (10 Sep 2018 05:29) (95% - 98%)    PHYSICAL EXAM:    Constitutional: NAD, lying in  bed  HEENT: EOMI, perrl  Neck: No LAD  Respiratory: dec bs  Cardiovascular: S1 and S2, RRR  Gastrointestinal: soft nt nd  Extremities: No peripheral edema  Vascular: 2+ peripheral pulses  Neurological: Awake alert responds appropriately  Skin: No rashes      LABS:                        9.4    9.58  )-----------( 447      ( 10 Sep 2018 06:44 )             29.2     09-10    136  |  102  |  16  ----------------------------<  109<H>  4.5   |  25  |  1.20    Ca    9.3      10 Sep 2018 06:44    TPro  6.6  /  Alb  2.9<L>  /  TBili  0.3  /  DBili  x   /  AST  21  /  ALT  38  /  AlkPhos  103  09-10          RADIOLOGY & ADDITIONAL TESTS:

## 2018-09-10 NOTE — PROGRESS NOTE ADULT - SUBJECTIVE AND OBJECTIVE BOX
ID progress note     Name: VERNON FRY  Age: 67y  Gender: Male  MRN: [id number]    Interval History-- Events noted, doing well, GI follow up noted, patient claims he is going for colonoscopy tomorrow . He had EGD last week showed duodenal ulcer   Notes reviewed    Past Medical History--  Alcohol abuse  HTN (hypertension)  Hyponatremia  FTT (failure to thrive) in adult  Seizure  Anemia  Retention of urine  UTI (lower urinary tract infection)  Depression  CVA (cerebral infarction)  Backache  Retinal arterial branch occlusion, right  Enlarged tonsils  No significant past surgical history      For details regarding the patient's social history, family history, and other miscellaneous elements, please refer the initial infectious diseases consultation and/or the admitting history and physical examination for this admission.    Allergies--  Allergies    No Known Allergies    Intolerances        Medications--  Antibiotics:    Immunologic:  influenza   Vaccine 0.5 milliLiter(s) IntraMuscular once    Other:  acetaminophen   Tablet .. PRN  docusate sodium  ferrous    sulfate  fluticasone propionate 50 MICROgram(s)/spray Nasal Spray  iron sucrose Injectable  metoprolol tartrate  mirtazapine  ondansetron Injectable PRN  pantoprazole    Tablet  phenytoin   Capsule  sertraline  simvastatin  tamsulosin      Review of Systems--  A 10-point review of systems was obtained.     Pertinent positives and negatives--  Constitutional: No fevers. No Chills. No Rigors.   Cardiovascular: No chest pain. No palpitations.  Respiratory: No shortness of breath. No cough.  Gastrointestinal: No nausea or vomiting. No diarrhea or constipation.   Psychiatric: Pleasant. Appropriate affect.    Review of systems otherwise negative except as previously noted.    Physical Examination--  Vital Signs: T(F): 99.6 (09-10-18 @ 05:29), Max: 99.6 (09-10-18 @ 05:29)  HR: 97 (09-10-18 @ 05:29)  BP: 133/80 (09-10-18 @ 05:29)  RR: 18 (09-10-18 @ 05:29)  SpO2: 97% (09-10-18 @ 05:29)  Wt(kg): --  General: Nontoxic-appearing Male in no acute distress.  HEENT: AT/NC. PERRL. EOMI. Anicteric. Conjunctiva pink and moist. Oropharynx clear. Dentition fair.  Neck: Not rigid. No sense of mass.  Nodes: None palpable.  Lungs: Clear bilaterally without rales, wheezing or rhonchi  Heart: Regular rate and rhythm. No Murmur. No rub. No gallop. No palpable thrill.  Abdomen: Bowel sounds present and normoactive. Soft. Nondistended. Nontender. No sense of mass. No organomegaly.  Back: No spinal tenderness. No costovertebral angle tenderness.   Extremities: No cyanosis or clubbing. No edema.   Skin: Warm. Dry. Good turgor. No rash. No vasculitic stigmata.  Psychiatric: Appropriate affect and mood for situation.         Laboratory Studies--  CBC                        9.4    9.58  )-----------( 447      ( 10 Sep 2018 06:44 )             29.2       Chemistries  09-10    136  |  102  |  16  ----------------------------<  109<H>  4.5   |  25  |  1.20    Ca    9.3      10 Sep 2018 06:44    TPro  6.6  /  Alb  2.9<L>  /  TBili  0.3  /  DBili  x   /  AST  21  /  ALT  38  /  AlkPhos  103  09-10      Culture Data    Culture - Urine (collected 07 Sep 2018 14:52)  Source: .Urine Clean Catch (Midstream)  Final Report (08 Sep 2018 10:17):    No growth    Culture - Blood (collected 07 Sep 2018 10:57)  Source: .Blood Blood  Preliminary Report (08 Sep 2018 11:01):    No growth to date.    Culture - Blood (collected 07 Sep 2018 10:57)  Source: .Blood Blood  Preliminary Report (08 Sep 2018 11:01):    No growth to date.        RADIOLOGY:  CT Abdomen and Pelvis No Cont (09.07.18 @ 08:01) >  Comparisons: CT abdomen pelvis dated 6/30/2014    Findings: No pleural fluid in the chest or ascites in the abdomen. The   visualized lower lungs areclear.   The liver and spleen appear normal in   size, shape and density. There are no focal masses or cysts.  The   gallbladder is normal.   There is no evidence of intrahepatic biliary   dilatation. The pancreas is unremarkable. A small hypodense nodule in the   left adrenal gland is unchanged. There is no evidence of retroperitoneal   lymphadenopathy. The kidneys are normal in size,  shape and density.   There is no evidence of obstructive uropathy or renal mass. 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 No renal calculi.   The aorta is normal in caliber and contour. No evidence of an aneurysm.     Scattered diverticula are present in the left colon. The terminal ileum   and appendix appear unremarkable..  There are no extracolonic fluid   collections or inflammatory changes.      Bone window examination demonstrates mild degenerative changes   consistent with age..  No evidence of osteolytic or osteoblastic bone   disease.     The prostate gland is normal in size, shape and density.. Coarse   calcifications are present in the prostate gland The inguinal areas are   normal..  No evidence of pelvic lymphadenopathy.   Urinary bladder is   normal in wall thickness, contour and density. Pelvic musculature appears  symmetrical.    Impression: No interval change.    Stable calcified right renal artery aneurysm.    Diverticulosis    Subcentimeter hypodense nodule left adrenal gland unchanged.    Assessment--  Assessment :   67y M hx of alcohol abuse, anemia never required blood transfusion, hx hemorrhagic CVA who  presented with fall and syncopal episode found to have severe anemia  Rule out GIB  Sp EGD showed duodenal ulcer   Fever and leukocytosis likely reactive now resolved   Septic work up ngtd  Doubt infected  Seen by GI  Also with + troponins    Plan :   will monitor off antibiotics  Trend temps and wbc  Trend H/H  Increase activity    Continue with present regiment.  Appropriate use of antibiotics and adverse effects reviewed.    I have discussed the above plan of care with patient in detail.  He  expressed understanding of the treatment plan . Risks, benefits and alternatives discussed in detail. I have asked if he has any questions or concerns and appropriately addressed them to the best of my ability .      > 25 minutes spent in direct patient care reviewing  the notes, lab data/ imaging , discussion with multidisciplinary team. All questions were addressed and answered to the best of my capacity .    Thank you for allowing me to participate in the care of your patient .        Misty Saha MD  [phone]

## 2018-09-10 NOTE — PROGRESS NOTE ADULT - SUBJECTIVE AND OBJECTIVE BOX
Patient is a 67y old  Male who presents with a chief complaint of Anemia (10 Sep 2018 11:27)       Pt is seen and examined  pt is awake and lying in bed/out of bed to chair  pt seems comfortable and denies any complaints at this time    HPI:  67y M hx of alcohol abuse (denies drinking), anemia never required blood transfusion, prior hemorrhagic stroke here with generalized weakness, fatigue, exertional syncope. Pt states 4-5 days ago walked into grocery store from parking lot and passed out in threshold. States did not hit head. States drank some fluids, declines EMS transport and went about his shopping, When he got home he walked back and forth to house w 4 loads of groceries and afterward "passed out" again on his bed, no head strike. States in last several days has felt generally weak, fatigue, dyspnea w minimal exertion. States he intermittently takes ASA "when he has a heart pain." States he has never had an EGD or colonoscopy. (06 Sep 2018 17:22)         ROS:  Negative except for:    MEDICATIONS  (STANDING):  docusate sodium 100 milliGRAM(s) Oral two times a day  ferrous    sulfate 325 milliGRAM(s) Oral two times a day  fluticasone propionate 50 MICROgram(s)/spray Nasal Spray 1 Spray(s) Both Nostrils two times a day  influenza   Vaccine 0.5 milliLiter(s) IntraMuscular once  iron sucrose Injectable 100 milliGRAM(s) IV Push every 24 hours  metoprolol tartrate 25 milliGRAM(s) Oral two times a day  mirtazapine 30 milliGRAM(s) Oral at bedtime  pantoprazole    Tablet 40 milliGRAM(s) Oral before breakfast  phenytoin   Capsule 200 milliGRAM(s) Oral two times a day  sertraline 50 milliGRAM(s) Oral daily  simvastatin 20 milliGRAM(s) Oral at bedtime  sucralfate 1 Gram(s) Oral two times a day  tamsulosin 0.4 milliGRAM(s) Oral at bedtime    MEDICATIONS  (PRN):  acetaminophen   Tablet .. 650 milliGRAM(s) Oral every 6 hours PRN Mild Pain (1 - 3)  ondansetron Injectable 4 milliGRAM(s) IV Push every 6 hours PRN Nausea      Allergies    No Known Allergies    Intolerances        Vital Signs Last 24 Hrs  T(C): 37.1 (10 Sep 2018 13:44), Max: 37.6 (10 Sep 2018 05:29)  T(F): 98.8 (10 Sep 2018 13:44), Max: 99.6 (10 Sep 2018 05:29)  HR: 99 (10 Sep 2018 13:44) (80 - 111)  BP: 142/82 (10 Sep 2018 13:44) (125/78 - 176/93)  BP(mean): --  RR: 20 (10 Sep 2018 13:44) (17 - 20)  SpO2: 99% (10 Sep 2018 13:44) (95% - 99%)    PHYSICAL EXAM  General: adult in NAD  HEENT: clear oropharynx, anicteric sclera, pink conjunctiva  Neck: supple  CV: normal S1/S2 with no murmur rubs or gallops  Lungs: positive air movement b/l ant lungs,clear to auscultation, no wheezes, no rales  Abdomen: soft non-tender non-distended, no hepatosplenomegaly  Ext: no clubbing cyanosis or edema  Skin: no rashes and no petechiae  Neuro: alert and oriented X 4, no focal deficits  LABS:                          9.4    9.58  )-----------( 447      ( 10 Sep 2018 06:44 )             29.2         Mean Cell Volume : 91.5 fl  Mean Cell Hemoglobin : 29.5 pg  Mean Cell Hemoglobin Concentration : 32.2 gm/dL  Auto Neutrophil # : x  Auto Lymphocyte # : x  Auto Monocyte # : x  Auto Eosinophil # : x  Auto Basophil # : x  Auto Neutrophil % : x  Auto Lymphocyte % : x  Auto Monocyte % : x  Auto Eosinophil % : x  Auto Basophil % : x    Serial CBC's  09-10 @ 06:44  Hct-29.2 / Hgb-9.4 / Plat-447 / RBC-3.19 / WBC-9.58          Serial CBC's  09-09 @ 08:17  Hct-27.6 / Hgb-9.1 / Plat-394 / RBC-3.11 / WBC-11.75          Serial CBC's  09-08 @ 06:36  Hct-26.0 / Hgb-8.5 / Plat-349 / RBC-2.92 / WBC-11.69          Serial CBC's  09-07 @ 17:15  Hct-24.8 / Hgb-8.2 / Plat-308 / RBC-2.81 / WBC-11.81          Serial CBC's  09-07 @ 06:31  Hct-24.1 / Hgb-8.0 / Plat-274 / RBC-2.74 / WBC-12.23            09-10    136  |  102  |  16  ----------------------------<  109<H>  4.5   |  25  |  1.20    Ca    9.3      10 Sep 2018 06:44    TPro  6.6  /  Alb  2.9<L>  /  TBili  0.3  /  DBili  x   /  AST  21  /  ALT  38  /  AlkPhos  103  09-10          Iron - Total Binding Capacity.: 346 ug/dL (09-08-18 @ 10:09)  Ferritin, Serum: 54 ng/mL (09-08-18 @ 10:09)  Vitamin B12, Serum: 1214 pg/mL (09-08-18 @ 10:09)  Folate, Serum: 14.6 ng/mL (09-08-18 @ 10:09)  Reticulocyte Percent: 5.7 % (09-08-18 @ 06:36)  Iron - Total Binding Capacity.: 322 ug/dL (09-07-18 @ 08:23)                BLOOD SMEAR INTERPRETATION:       RADIOLOGY & ADDITIONAL STUDIES: Patient is a 67y old  Male who presents with a chief complaint of Anemia (10 Sep 2018 11:27)       Pt is seen and examined  pt is awake and lying in bed  s/p egd--duodenal ulcer per gi, path--pending  pt seems comfortable at this time    HPI:  67y M hx of alcohol abuse (denies drinking), anemia never required blood transfusion, prior hemorrhagic stroke here with generalized weakness, fatigue, exertional syncope. Pt states 4-5 days ago walked into grocery store from parking lot and passed out in threshold. States did not hit head. States drank some fluids, declines EMS transport and went about his shopping, When he got home he walked back and forth to house w 4 loads of groceries and afterward "passed out" again on his bed, no head strike. States in last several days has felt generally weak, fatigue, dyspnea w minimal exertion. States he intermittently takes ASA "when he has a heart pain." States he has never had an EGD or colonoscopy. (06 Sep 2018 17:22)           MEDICATIONS  (STANDING):  docusate sodium 100 milliGRAM(s) Oral two times a day  ferrous    sulfate 325 milliGRAM(s) Oral two times a day  fluticasone propionate 50 MICROgram(s)/spray Nasal Spray 1 Spray(s) Both Nostrils two times a day  influenza   Vaccine 0.5 milliLiter(s) IntraMuscular once  iron sucrose Injectable 100 milliGRAM(s) IV Push every 24 hours  metoprolol tartrate 25 milliGRAM(s) Oral two times a day  mirtazapine 30 milliGRAM(s) Oral at bedtime  pantoprazole    Tablet 40 milliGRAM(s) Oral before breakfast  phenytoin   Capsule 200 milliGRAM(s) Oral two times a day  sertraline 50 milliGRAM(s) Oral daily  simvastatin 20 milliGRAM(s) Oral at bedtime  sucralfate 1 Gram(s) Oral two times a day  tamsulosin 0.4 milliGRAM(s) Oral at bedtime    MEDICATIONS  (PRN):  acetaminophen   Tablet .. 650 milliGRAM(s) Oral every 6 hours PRN Mild Pain (1 - 3)  ondansetron Injectable 4 milliGRAM(s) IV Push every 6 hours PRN Nausea      Allergies    No Known Allergies    Intolerances        Vital Signs Last 24 Hrs  T(C): 37.1 (10 Sep 2018 13:44), Max: 37.6 (10 Sep 2018 05:29)  T(F): 98.8 (10 Sep 2018 13:44), Max: 99.6 (10 Sep 2018 05:29)  HR: 99 (10 Sep 2018 13:44) (80 - 111)  BP: 142/82 (10 Sep 2018 13:44) (125/78 - 176/93)  BP(mean): --  RR: 20 (10 Sep 2018 13:44) (17 - 20)  SpO2: 99% (10 Sep 2018 13:44) (95% - 99%)    PHYSICAL EXAM  General: adult in NAD  HEENT: clear oropharynx, anicteric sclera, pink conjunctiva  Neck: supple  CV: normal S1/S2 with no murmur rubs or gallops  Lungs: positive air movement b/l ant lungs,clear to auscultation, no wheezes, no rales  Abdomen: soft non-tender non-distended, no hepatosplenomegaly  Ext: no clubbing cyanosis or edema  Skin: no rashes and no petechiae  Neuro: alert and oriented X 4, no focal deficits  LABS:                          9.4    9.58  )-----------( 447      ( 10 Sep 2018 06:44 )             29.2         Mean Cell Volume : 91.5 fl  Mean Cell Hemoglobin : 29.5 pg  Mean Cell Hemoglobin Concentration : 32.2 gm/dL  Auto Neutrophil # : x  Auto Lymphocyte # : x  Auto Monocyte # : x  Auto Eosinophil # : x  Auto Basophil # : x  Auto Neutrophil % : x  Auto Lymphocyte % : x  Auto Monocyte % : x  Auto Eosinophil % : x  Auto Basophil % : x    Serial CBC's  09-10 @ 06:44  Hct-29.2 / Hgb-9.4 / Plat-447 / RBC-3.19 / WBC-9.58          Serial CBC's  09-09 @ 08:17  Hct-27.6 / Hgb-9.1 / Plat-394 / RBC-3.11 / WBC-11.75          Serial CBC's  09-08 @ 06:36  Hct-26.0 / Hgb-8.5 / Plat-349 / RBC-2.92 / WBC-11.69          Serial CBC's  09-07 @ 17:15  Hct-24.8 / Hgb-8.2 / Plat-308 / RBC-2.81 / WBC-11.81          Serial CBC's  09-07 @ 06:31  Hct-24.1 / Hgb-8.0 / Plat-274 / RBC-2.74 / WBC-12.23            09-10    136  |  102  |  16  ----------------------------<  109<H>  4.5   |  25  |  1.20    Ca    9.3      10 Sep 2018 06:44    TPro  6.6  /  Alb  2.9<L>  /  TBili  0.3  /  DBili  x   /  AST  21  /  ALT  38  /  AlkPhos  103  09-10          Iron - Total Binding Capacity.: 346 ug/dL (09-08-18 @ 10:09)  Ferritin, Serum: 54 ng/mL (09-08-18 @ 10:09)  Vitamin B12, Serum: 1214 pg/mL (09-08-18 @ 10:09)  Folate, Serum: 14.6 ng/mL (09-08-18 @ 10:09)  Reticulocyte Percent: 5.7 % (09-08-18 @ 06:36)  Iron - Total Binding Capacity.: 322 ug/dL (09-07-18 @ 08:23)

## 2018-09-11 ENCOUNTER — TRANSCRIPTION ENCOUNTER (OUTPATIENT)
Age: 68
End: 2018-09-11

## 2018-09-11 LAB
ALBUMIN SERPL ELPH-MCNC: 2.9 G/DL — LOW (ref 3.3–5)
ALP SERPL-CCNC: 104 U/L — SIGNIFICANT CHANGE UP (ref 40–120)
ALT FLD-CCNC: 41 U/L — SIGNIFICANT CHANGE UP (ref 12–78)
ANION GAP SERPL CALC-SCNC: 7 MMOL/L — SIGNIFICANT CHANGE UP (ref 5–17)
AST SERPL-CCNC: 20 U/L — SIGNIFICANT CHANGE UP (ref 15–37)
BILIRUB SERPL-MCNC: 0.2 MG/DL — SIGNIFICANT CHANGE UP (ref 0.2–1.2)
BUN SERPL-MCNC: 20 MG/DL — SIGNIFICANT CHANGE UP (ref 7–23)
CALCIUM SERPL-MCNC: 8.6 MG/DL — SIGNIFICANT CHANGE UP (ref 8.5–10.1)
CHLORIDE SERPL-SCNC: 103 MMOL/L — SIGNIFICANT CHANGE UP (ref 96–108)
CO2 SERPL-SCNC: 27 MMOL/L — SIGNIFICANT CHANGE UP (ref 22–31)
CREAT SERPL-MCNC: 1.1 MG/DL — SIGNIFICANT CHANGE UP (ref 0.5–1.3)
GLUCOSE SERPL-MCNC: 106 MG/DL — HIGH (ref 70–99)
HCT VFR BLD CALC: 28.6 % — LOW (ref 39–50)
HGB BLD-MCNC: 9.3 G/DL — LOW (ref 13–17)
MCHC RBC-ENTMCNC: 29.7 PG — SIGNIFICANT CHANGE UP (ref 27–34)
MCHC RBC-ENTMCNC: 32.5 GM/DL — SIGNIFICANT CHANGE UP (ref 32–36)
MCV RBC AUTO: 91.4 FL — SIGNIFICANT CHANGE UP (ref 80–100)
NRBC # BLD: 0 /100 WBCS — SIGNIFICANT CHANGE UP (ref 0–0)
PLATELET # BLD AUTO: 453 K/UL — HIGH (ref 150–400)
POTASSIUM SERPL-MCNC: 4.7 MMOL/L — SIGNIFICANT CHANGE UP (ref 3.5–5.3)
POTASSIUM SERPL-SCNC: 4.7 MMOL/L — SIGNIFICANT CHANGE UP (ref 3.5–5.3)
PROT SERPL-MCNC: 6.6 G/DL — SIGNIFICANT CHANGE UP (ref 6–8.3)
RBC # BLD: 3.13 M/UL — LOW (ref 4.2–5.8)
RBC # FLD: 16.8 % — HIGH (ref 10.3–14.5)
SODIUM SERPL-SCNC: 137 MMOL/L — SIGNIFICANT CHANGE UP (ref 135–145)
WBC # BLD: 11 K/UL — HIGH (ref 3.8–10.5)
WBC # FLD AUTO: 11 K/UL — HIGH (ref 3.8–10.5)

## 2018-09-11 PROCEDURE — 99232 SBSQ HOSP IP/OBS MODERATE 35: CPT

## 2018-09-11 RX ORDER — SOD SULF/SODIUM/NAHCO3/KCL/PEG
1 SOLUTION, RECONSTITUTED, ORAL ORAL EVERY 4 HOURS
Qty: 0 | Refills: 0 | Status: COMPLETED | OUTPATIENT
Start: 2018-09-11 | End: 2018-09-11

## 2018-09-11 RX ADMIN — Medication 200 MILLIGRAM(S): at 05:37

## 2018-09-11 RX ADMIN — SERTRALINE 50 MILLIGRAM(S): 25 TABLET, FILM COATED ORAL at 11:18

## 2018-09-11 RX ADMIN — Medication 325 MILLIGRAM(S): at 05:37

## 2018-09-11 RX ADMIN — Medication 325 MILLIGRAM(S): at 17:50

## 2018-09-11 RX ADMIN — Medication 25 MILLIGRAM(S): at 17:50

## 2018-09-11 RX ADMIN — Medication 1 LITER(S): at 21:39

## 2018-09-11 RX ADMIN — SIMVASTATIN 20 MILLIGRAM(S): 20 TABLET, FILM COATED ORAL at 21:39

## 2018-09-11 RX ADMIN — Medication 100 MILLIGRAM(S): at 05:37

## 2018-09-11 RX ADMIN — Medication 25 MILLIGRAM(S): at 05:38

## 2018-09-11 RX ADMIN — Medication 10 MILLIGRAM(S): at 15:43

## 2018-09-11 RX ADMIN — Medication 1 LITER(S): at 18:05

## 2018-09-11 RX ADMIN — PANTOPRAZOLE SODIUM 40 MILLIGRAM(S): 20 TABLET, DELAYED RELEASE ORAL at 06:51

## 2018-09-11 RX ADMIN — Medication 200 MILLIGRAM(S): at 17:50

## 2018-09-11 RX ADMIN — Medication 100 MILLIGRAM(S): at 17:50

## 2018-09-11 RX ADMIN — Medication 1 GRAM(S): at 05:38

## 2018-09-11 RX ADMIN — Medication 1 SPRAY(S): at 17:50

## 2018-09-11 RX ADMIN — IRON SUCROSE 100 MILLIGRAM(S): 20 INJECTION, SOLUTION INTRAVENOUS at 10:10

## 2018-09-11 RX ADMIN — Medication 1 SPRAY(S): at 05:40

## 2018-09-11 RX ADMIN — Medication 1 GRAM(S): at 17:50

## 2018-09-11 RX ADMIN — MIRTAZAPINE 30 MILLIGRAM(S): 45 TABLET, ORALLY DISINTEGRATING ORAL at 21:39

## 2018-09-11 RX ADMIN — TAMSULOSIN HYDROCHLORIDE 0.4 MILLIGRAM(S): 0.4 CAPSULE ORAL at 21:39

## 2018-09-11 RX ADMIN — Medication 10 MILLIGRAM(S): at 21:39

## 2018-09-11 NOTE — PROGRESS NOTE ADULT - SUBJECTIVE AND OBJECTIVE BOX
INTERVAL HPI/OVERNIGHT EVENTS:  pt seen and examined  denies n/v/abd pain, +bm yesterday  per overnight rn no s/s overt gib  labs noted afebrile overnight    MEDICATIONS  (STANDING):  docusate sodium 100 milliGRAM(s) Oral two times a day  ferrous    sulfate 325 milliGRAM(s) Oral two times a day  fluticasone propionate 50 MICROgram(s)/spray Nasal Spray 1 Spray(s) Both Nostrils two times a day  influenza   Vaccine 0.5 milliLiter(s) IntraMuscular once  metoprolol tartrate 25 milliGRAM(s) Oral two times a day  mirtazapine 30 milliGRAM(s) Oral at bedtime  pantoprazole    Tablet 40 milliGRAM(s) Oral before breakfast  phenytoin   Capsule 200 milliGRAM(s) Oral two times a day  sertraline 50 milliGRAM(s) Oral daily  simvastatin 20 milliGRAM(s) Oral at bedtime  sucralfate 1 Gram(s) Oral two times a day  tamsulosin 0.4 milliGRAM(s) Oral at bedtime    MEDICATIONS  (PRN):  acetaminophen   Tablet .. 650 milliGRAM(s) Oral every 6 hours PRN Mild Pain (1 - 3)  acetaminophen   Tablet .. 650 milliGRAM(s) Oral once PRN Mild Pain (1 - 3), Moderate Pain (4 - 6)  ondansetron Injectable 4 milliGRAM(s) IV Push every 6 hours PRN Nausea      Allergies    No Known Allergies    Intolerances        Review of Systems:    General:  No wt loss, fevers, chills, night sweats, fatigue   Eyes:  Good vision, no reported pain  ENT:  No sore throat, pain, runny nose, dysphagia  CV:  No pain, palpitations, hypo/hypertension  Resp:  No dyspnea, cough, tachypnea, wheezing  GI:  No pain, No nausea, No vomiting, No diarrhea, No constipation, No weight loss, No fever, No pruritis, No rectal bleeding, No melena, No dysphagia  :  No pain, bleeding, incontinence, nocturia  Muscle:  No pain, weakness  Neuro:  No weakness, tingling, memory problems  Psych:  No fatigue, insomnia, mood problems, depression  Endocrine:  No polyuria, polydypsia, cold/heat intolerance  Heme:  No petechiae, ecchymosis, easy bruisability  Skin:  No rash, tattoos, scars, edema      Vital Signs Last 24 Hrs  T(C): 37.6 (11 Sep 2018 08:08), Max: 37.6 (11 Sep 2018 08:08)  T(F): 99.7 (11 Sep 2018 08:08), Max: 99.7 (11 Sep 2018 08:08)  HR: 82 (11 Sep 2018 08:08) (82 - 106)  BP: 123/73 (11 Sep 2018 08:08) (103/68 - 154/73)  BP(mean): --  RR: 16 (11 Sep 2018 08:08) (16 - 20)  SpO2: 98% (11 Sep 2018 08:08) (96% - 99%)    PHYSICAL EXAM:      Constitutional: NAD, lying in  bed  HEENT: EOMI, perrl  Neck: No LAD  Respiratory: dec bs  Cardiovascular: S1 and S2, RRR  Gastrointestinal: soft nt nd  Extremities: No peripheral edema  Vascular: 2+ peripheral pulses  Neurological: Awake alert responds appropriately  Skin: No rashes        LABS:                        9.3    11.00 )-----------( 453      ( 11 Sep 2018 06:29 )             28.6     09-11    137  |  103  |  20  ----------------------------<  106<H>  4.7   |  27  |  1.10    Ca    8.6      11 Sep 2018 06:29    TPro  6.6  /  Alb  2.9<L>  /  TBili  0.2  /  DBili  x   /  AST  20  /  ALT  41  /  AlkPhos  104  09-11          RADIOLOGY & ADDITIONAL TESTS:  Surgical Pathology Final Report -  (09.07.18 @ 09:43)    Surgical Pathology Final Report - :   ACCESSION No:  [id number]    VERNON FRY                         1        Surgical Final Report          Final Diagnosis  1. Small intestinal mucosa with peptic ulceration and acute and  chronic inflammation (duodenal biopsy).  Negative for malignancy.    2. Diff-quick and Alcian blue/PAS stains examined.  Gastric antral mucosa with focal intestinal metaplasia showing  active chronic gastritis, mild.  Special stain for Helicobacter pylori is negative.    Denis Dorantes M.D.  (Electronic Signature)  Reported on: 09/11/18    Clinical Information  Gastritis, duodenal ulcer    Specimen Description  1-Duodenal ulcer biopsy  2-Antral biopsy, rule out H. pylori    Gross Description  1. The specimen is received in formalin and the specimen  container is labeled: Duodenal ulcer biopsy. It consists of  multiple fragments of tan soft tissue measuring in aggregate 0.3  x 0.2 x 0.1 cm. Entirely submitted. One cassette.    2. The specimen is received in formalin and the specimen  container is labeled: Antral biopsy, rule out H. pylori. It  consists of a fragment of tan soft tissue measuring 0.2 cm in  greatest dimension.  Entirely submitted. One cassette.    In addition to other data that may appear on the specimen  containers, all labels have been inspected to confirm the  presence of the patient's name and date of birth.  TIM 9/11/2018 6:51 AM

## 2018-09-11 NOTE — PROGRESS NOTE ADULT - SUBJECTIVE AND OBJECTIVE BOX
NP Progress Note     [facility] -- Jona Bush, Ghada Thomson Pannella, Patel, Savella  Office # [phone]      Follow Up:  Consult    HPI:  67y M hx of alcohol abuse (denies drinking), anemia never required blood transfusion, prior hemorrhagic stroke here with generalized weakness, fatigue, exertional syncope. Pt states 4-5 days ago walked into grocery store from parking lot and passed out in threshold. States did not hit head. States drank some fluids, declines EMS transport and went about his shopping, When he got home he walked back and forth to house w 4 loads of groceries and afterward "passed out" again on his bed, no head strike. States in last several days has felt generally weak, fatigue, dyspnea w minimal exertion. States he intermittently takes ASA "when he has a heart pain." States he has never had an EGD or colonoscopy. (06 Sep 2018 17:22)        Subjective/Observations: Pt. seen and examined and evaluated. Pt. resting comfortably in bed in NAD, with no respiratory distress, no chest pain, dyspnea, palpitations, PND, or orthopnea.      REVIEW OF SYSTEMS: All other review of systems is negative unless indicated above    PAST MEDICAL & SURGICAL HISTORY:  Alcohol abuse: hx of etoh abuse  HTN (hypertension)  Hyponatremia: 124 in June 2014  FTT (failure to thrive) in adult: 6/2014  Seizure: 6/2014- abnormal eeg  Anemia  Retention of urine: 6/2014  UTI (lower urinary tract infection): mssa  Depression  CVA (cerebral infarction): 6/2014- small rt ant.cerebral artery subacute infarct  Backache: spinal stenosis  Retinal arterial branch occlusion, right: vision loss right eye      MEDICATIONS  (STANDING):  docusate sodium 100 milliGRAM(s) Oral two times a day  ferrous    sulfate 325 milliGRAM(s) Oral two times a day  fluticasone propionate 50 MICROgram(s)/spray Nasal Spray 1 Spray(s) Both Nostrils two times a day  influenza   Vaccine 0.5 milliLiter(s) IntraMuscular once  iron sucrose Injectable 100 milliGRAM(s) IV Push every 24 hours  metoprolol tartrate 25 milliGRAM(s) Oral two times a day  mirtazapine 30 milliGRAM(s) Oral at bedtime  pantoprazole    Tablet 40 milliGRAM(s) Oral before breakfast  phenytoin   Capsule 200 milliGRAM(s) Oral two times a day  sertraline 50 milliGRAM(s) Oral daily  simvastatin 20 milliGRAM(s) Oral at bedtime  sucralfate 1 Gram(s) Oral two times a day  tamsulosin 0.4 milliGRAM(s) Oral at bedtime    MEDICATIONS  (PRN):  acetaminophen   Tablet .. 650 milliGRAM(s) Oral every 6 hours PRN Mild Pain (1 - 3)  acetaminophen   Tablet .. 650 milliGRAM(s) Oral once PRN Mild Pain (1 - 3), Moderate Pain (4 - 6)  ondansetron Injectable 4 milliGRAM(s) IV Push every 6 hours PRN Nausea      Allergies: No Known Allergies      Vital Signs Last 24 Hrs  T(C): 37.2 (11 Sep 2018 06:00), Max: 37.6 (10 Sep 2018 08:10)  T(F): 99 (11 Sep 2018 06:00), Max: 99.6 (10 Sep 2018 08:10)  HR: 106 (11 Sep 2018 06:00) (88 - 106)  BP: 121/74 (11 Sep 2018 06:00) (103/68 - 154/73)  BP(mean): --  RR: 18 (11 Sep 2018 06:00) (18 - 20)  SpO2: 98% (11 Sep 2018 06:00) (96% - 99%)    I&O's Summary    10 Sep 2018 07:01  -  11 Sep 2018 07:00  --------------------------------------------------------  IN: 480 mL / OUT: 0 mL / NET: 480 mL              LABS: All Labs Reviewed:                        9.3    11.00 )-----------( 453      ( 11 Sep 2018 06:29 )             28.6        11 Sep 2018 06:29    137    |  103    |  20     ----------------------------<  106    4.7     |  27     |  1.10     Ca    8.6        11 Sep 2018 06:29    TPro  6.6    /  Alb  2.9    /  TBili  0.2    /  DBili  x      /  AST  20     /  ALT  41     /  AlkPhos  104    11 Sep 2018 06:29    Echo:  < from: TTE Echo Doppler w/o Cont (09.08.18 @ 09:25) >  OBSERVATIONS:    Mitral Valve: normal, trace physiologic MR.  Aortic Valve/Aorta: Calcified trileaflet aortic valve.  Tricuspid Valve: normal with trace TR.  Pulmonic Valve: normal  Left Atrium: normal  Right Atrium: normal  Left Ventricle: normal LV size and systolic function, estimated LVEF of   65%.  Right Ventricle: normal size and systolic function.  Pericardium/Pleura: no significant pleural effusion, no significant   pericardial effusion. There is an anterior echo-free space, consistent   with pericardial fat.  Pulmonary/RV Pressure: estimated PA systolic pressure of 29 mmHg assuming   an RA pressure of 10 mmHg.  LV Diastolic Function:  E:E' is consistent with elevated left heart   filling pressure.    Normal left ventricular size and systolic function, estimated LVEF of   65%. Normal right ventricular size and systolic function. E:E' is   consistent with elevated left heart filling pressure. Normal biatrial   size. The aortic root is normal in size. The mitral valve is structurally   normal, trace physiologic MR. The aortic valve is calcified without   stenosis or insufficiency. Trace physiologic TR. Estimated PA systolic   pressure is 29 mm Hg, assuming an RA pressure of 10 mmHg. No significant   pericardial effusion. Anterior echo-free space, consistent with   pericardial fat.            Stress Testing:     Cath:    Imaging:    Interpretation of Telemetry:      Physical Exam:  Appearance: [ ] Normal  [ ] abnormal [ ] NAD   Eyes: [ ] PERRL [ ] EOMI  HEENT: [ ] Normal [ ] Abnormal oral mucosa [ ]NC/AT  Cardiovascular: [ ] S1 [ ] S2 [ ] RRR [ ] m/r/g [ ]edema [ ] JVP  Procedural Access Site: [ ]  hematoma [ ] tender to palpation [ ] 2+ pulse [ ] bruit [ ] Ecchymosis  Respiratory: [ ] Clear to auscultation bilaterally  Gastrointestinal: [ ] Soft [ ] tenderness[ ] distension [ ] BS  Musculoskeletal: [ ] clubbing [ ] joint deformity   Neurologic: [ ] Non-focal  Lymphatic: [ ] lymphadenopathy  Psychiatry: [ ] AAOx3  [ ] confused [ ] disoriented [ ] Mood & affect appropriate  Skin: [ ]  rashes [ ] ecchymoses [ ] cyanosis NP Progress Note     [facility] -- Jona Bush, Ghada Thomson Pannella, Patel, Savella  Office # [phone]      Follow Up:  Consult    HPI:  67y M hx of alcohol abuse (denies drinking), anemia never required blood transfusion, prior hemorrhagic stroke here with generalized weakness, fatigue, exertional syncope. Pt states 4-5 days ago walked into grocery store from parking lot and passed out in threshold. States did not hit head. States drank some fluids, declines EMS transport and went about his shopping, When he got home he walked back and forth to house w 4 loads of groceries and afterward "passed out" again on his bed, no head strike. States in last several days has felt generally weak, fatigue, dyspnea w minimal exertion. States he intermittently takes ASA "when he has a heart pain." States he has never had an EGD or colonoscopy. (06 Sep 2018 17:22)        Subjective/Observations: Pt. seen and examined and evaluated. Pt. resting comfortably in bed in NAD, with no respiratory distress, no chest pain, dyspnea, palpitations, PND, or orthopnea.      REVIEW OF SYSTEMS: All other review of systems is negative unless indicated above    PAST MEDICAL & SURGICAL HISTORY:  Alcohol abuse: hx of etoh abuse  HTN (hypertension)  Hyponatremia: 124 in June 2014  FTT (failure to thrive) in adult: 6/2014  Seizure: 6/2014- abnormal eeg  Anemia  Retention of urine: 6/2014  UTI (lower urinary tract infection): mssa  Depression  CVA (cerebral infarction): 6/2014- small rt ant.cerebral artery subacute infarct  Backache: spinal stenosis  Retinal arterial branch occlusion, right: vision loss right eye      MEDICATIONS  (STANDING):  docusate sodium 100 milliGRAM(s) Oral two times a day  ferrous    sulfate 325 milliGRAM(s) Oral two times a day  fluticasone propionate 50 MICROgram(s)/spray Nasal Spray 1 Spray(s) Both Nostrils two times a day  influenza   Vaccine 0.5 milliLiter(s) IntraMuscular once  iron sucrose Injectable 100 milliGRAM(s) IV Push every 24 hours  metoprolol tartrate 25 milliGRAM(s) Oral two times a day  mirtazapine 30 milliGRAM(s) Oral at bedtime  pantoprazole    Tablet 40 milliGRAM(s) Oral before breakfast  phenytoin   Capsule 200 milliGRAM(s) Oral two times a day  sertraline 50 milliGRAM(s) Oral daily  simvastatin 20 milliGRAM(s) Oral at bedtime  sucralfate 1 Gram(s) Oral two times a day  tamsulosin 0.4 milliGRAM(s) Oral at bedtime    MEDICATIONS  (PRN):  acetaminophen   Tablet .. 650 milliGRAM(s) Oral every 6 hours PRN Mild Pain (1 - 3)  acetaminophen   Tablet .. 650 milliGRAM(s) Oral once PRN Mild Pain (1 - 3), Moderate Pain (4 - 6)  ondansetron Injectable 4 milliGRAM(s) IV Push every 6 hours PRN Nausea      Allergies: No Known Allergies      Vital Signs Last 24 Hrs  T(C): 37.2 (11 Sep 2018 06:00), Max: 37.6 (10 Sep 2018 08:10)  T(F): 99 (11 Sep 2018 06:00), Max: 99.6 (10 Sep 2018 08:10)  HR: 106 (11 Sep 2018 06:00) (88 - 106)  BP: 121/74 (11 Sep 2018 06:00) (103/68 - 154/73)  BP(mean): --  RR: 18 (11 Sep 2018 06:00) (18 - 20)  SpO2: 98% (11 Sep 2018 06:00) (96% - 99%)    I&O's Summary    10 Sep 2018 07:01  -  11 Sep 2018 07:00  --------------------------------------------------------  IN: 480 mL / OUT: 0 mL / NET: 480 mL              LABS: All Labs Reviewed:                        9.3    11.00 )-----------( 453      ( 11 Sep 2018 06:29 )             28.6        11 Sep 2018 06:29    137    |  103    |  20     ----------------------------<  106    4.7     |  27     |  1.10     Ca    8.6        11 Sep 2018 06:29    TPro  6.6    /  Alb  2.9    /  TBili  0.2    /  DBili  x      /  AST  20     /  ALT  41     /  AlkPhos  104    11 Sep 2018 06:29    Echo:  < from: TTE Echo Doppler w/o Cont (09.08.18 @ 09:25) >  OBSERVATIONS:    Mitral Valve: normal, trace physiologic MR.  Aortic Valve/Aorta: Calcified trileaflet aortic valve.  Tricuspid Valve: normal with trace TR.  Pulmonic Valve: normal  Left Atrium: normal  Right Atrium: normal  Left Ventricle: normal LV size and systolic function, estimated LVEF of   65%.  Right Ventricle: normal size and systolic function.  Pericardium/Pleura: no significant pleural effusion, no significant   pericardial effusion. There is an anterior echo-free space, consistent   with pericardial fat.  Pulmonary/RV Pressure: estimated PA systolic pressure of 29 mmHg assuming   an RA pressure of 10 mmHg.  LV Diastolic Function:  E:E' is consistent with elevated left heart   filling pressure.    Normal left ventricular size and systolic function, estimated LVEF of   65%. Normal right ventricular size and systolic function. E:E' is   consistent with elevated left heart filling pressure. Normal biatrial   size. The aortic root is normal in size. The mitral valve is structurally   normal, trace physiologic MR. The aortic valve is calcified without   stenosis or insufficiency. Trace physiologic TR. Estimated PA systolic   pressure is 29 mm Hg, assuming an RA pressure of 10 mmHg. No significant   pericardial effusion. Anterior echo-free space, consistent with   pericardial fat.            Stress Testing:     Cath:    Imaging:    Interpretation of Telemetry: Pt. is not on telemetry       Physical Exam:  Appearance: [ ] Normal  [ ] abnormal [X ] NAD   Eyes: [ ] PERRL [ ] EOMI  HEENT: [ ] Normal [ ] Abnormal oral mucosa [ ]NC/AT  Cardiovascular: [X ] S1 [ X] S2 [ ] RRR [ ] m/r/g [ ]edema [ ] JVP  Procedural Access Site: [ ]  hematoma [ ] tender to palpation [ ] 2+ pulse [ ] bruit [ ] Ecchymosis  Respiratory: [X ] Clear to auscultation bilaterally  Gastrointestinal: [ ] Soft [ ] tenderness[ ] distension [ ] BS  Musculoskeletal: [ ] clubbing [ ] joint deformity   Neurologic: [ ] Non-focal  Lymphatic: [ ] lymphadenopathy  Psychiatry: [X ] AAOx3  [ ] confused [ ] disoriented [ ] Mood & affect appropriate  Skin: [ ]  rashes [ ] ecchymoses [ ] cyanosis

## 2018-09-11 NOTE — PROGRESS NOTE ADULT - SUBJECTIVE AND OBJECTIVE BOX
Patient is a 67y old  Male who presents with a chief complaint of Anemia (11 Sep 2018 11:27)      INTERVAL HPI/OVERNIGHT EVENTS:  T(C): 36.9 (09-11-18 @ 15:43), Max: 37.6 (09-11-18 @ 08:08)  HR: 83 (09-11-18 @ 15:43) (74 - 106)  BP: 124/75 (09-11-18 @ 15:43) (103/68 - 154/73)  RR: 17 (09-11-18 @ 15:43) (16 - 19)  SpO2: 97% (09-11-18 @ 15:43) (96% - 98%)  Wt(kg): --  I&O's Summary    10 Sep 2018 07:01  -  11 Sep 2018 07:00  --------------------------------------------------------  IN: 480 mL / OUT: 0 mL / NET: 480 mL    11 Sep 2018 07:01  -  11 Sep 2018 15:54  --------------------------------------------------------  IN: 240 mL / OUT: 0 mL / NET: 240 mL        LABS:                        9.3    11.00 )-----------( 453      ( 11 Sep 2018 06:29 )             28.6     09-11    137  |  103  |  20  ----------------------------<  106<H>  4.7   |  27  |  1.10    Ca    8.6      11 Sep 2018 06:29    TPro  6.6  /  Alb  2.9<L>  /  TBili  0.2  /  DBili  x   /  AST  20  /  ALT  41  /  AlkPhos  104  09-11        CAPILLARY BLOOD GLUCOSE                MEDICATIONS  (STANDING):  bisacodyl 10 milliGRAM(s) Oral every 4 hours  docusate sodium 100 milliGRAM(s) Oral two times a day  ferrous    sulfate 325 milliGRAM(s) Oral two times a day  fluticasone propionate 50 MICROgram(s)/spray Nasal Spray 1 Spray(s) Both Nostrils two times a day  influenza   Vaccine 0.5 milliLiter(s) IntraMuscular once  metoprolol tartrate 25 milliGRAM(s) Oral two times a day  mirtazapine 30 milliGRAM(s) Oral at bedtime  pantoprazole    Tablet 40 milliGRAM(s) Oral before breakfast  phenytoin   Capsule 200 milliGRAM(s) Oral two times a day  polyethylene glycol/electrolyte Solution 1 Liter(s) Oral every 4 hours  sertraline 50 milliGRAM(s) Oral daily  simvastatin 20 milliGRAM(s) Oral at bedtime  sucralfate 1 Gram(s) Oral two times a day  tamsulosin 0.4 milliGRAM(s) Oral at bedtime    MEDICATIONS  (PRN):  acetaminophen   Tablet .. 650 milliGRAM(s) Oral every 6 hours PRN Mild Pain (1 - 3)  acetaminophen   Tablet .. 650 milliGRAM(s) Oral once PRN Mild Pain (1 - 3), Moderate Pain (4 - 6)  ondansetron Injectable 4 milliGRAM(s) IV Push every 6 hours PRN Nausea          PHYSICAL EXAM:  GENERAL: NAD, well-groomed, well-developed  HEAD:  Atraumatic, Normocephalic  CHEST/LUNG: Clear to percussion bilaterally; No rales, rhonchi, wheezing, or rubs  HEART: Regular rate and rhythm; No murmurs, rubs, or gallops  ABDOMEN: Soft, Nontender, Nondistended; Bowel sounds present  EXTREMITIES:  2+ Peripheral Pulses, No clubbing, cyanosis, or edema  LYMPH: No lymphadenopathy noted  SKIN: No rashes or lesions    Care Discussed with Consultants/Other Providers [ ] YES  [ ] NO

## 2018-09-11 NOTE — PROGRESS NOTE ADULT - ASSESSMENT
67y old M PMH EtOH abuse, anemia, prior hemorrhagic stroke 2014, HTN, GERD, seizure 2014 who presents with a chief complaint of weakness and syncope. Found to be profoundly anemic, with hemoglobin 4.5, troponins slightly elevated.     #1 Elevated Troponins/CAD  - Mildly elevated troponins but other cardiac biomarkers normal, not consistent of ACS.  It is likely due to demand ischemia from profound anemia   - Patient has history of GERD, CP may likely be GI in etiology  - No arrhythmias on tele  - Patient has stable CAD, Continue BB and statin.  Okay to hold ASA for now until cleared by GI  - Echo with normal LVEF      #2 Syncope  - This is likely from his profound anemia with Hgb of 4.5 on admission     #3 Profound anemia  - S/P  PRBC's x 3 units total. H&H remains stable 9.3/28.6  Transfuse prn to keep >7  - On IV Venofer   - Heme/Onc consult noted    #4 GERD  -S/P EGD large duodenal ulcer   -Continue PPI/ carafate   - Avoid aggravating factors   - GI consult noted 67y old M PMH EtOH abuse, anemia, prior hemorrhagic stroke 2014, HTN, GERD, seizure 2014 who presents with a chief complaint of weakness and syncope. Found to be profoundly anemic, with hemoglobin 4.5, troponins slightly elevated.     #1 Elevated Troponins/CAD  - Mildly elevated troponins but other cardiac biomarkers normal, not consistent of ACS.  It is likely due to demand ischemia from profound anemia   - Patient has history of GERD, CP may likely be GI in etiology  - No arrhythmias on tele.  d/c telemetry  - Patient has stable CAD, Continue BB and statin.  Okay to hold ASA for now until cleared by GI  - Echo with normal LVEF      #2 Syncope  - This is likely from his profound anemia with Hgb of 4.5 on admission     #3 Profound anemia  - S/P  PRBC's x 3 units total. H&H remains stable 9.3/28.6  Transfuse prn to keep >7  - On IV Venofer   - Heme/Onc consult noted    #4 GERD  -S/P EGD large duodenal ulcer   -Continue PPI/ carafate   - Avoid aggravating factors   - GI consult noted

## 2018-09-11 NOTE — PROGRESS NOTE ADULT - ATTENDING COMMENTS
I personally saw and examined the patient in detail.  I have spoken to the above provider regarding the assessment and plan of care.  I reviewed the above assessment and plan of care, and agree.  I have made changes in the body of the note where appropriate.
The patient was personally seen and examined, in addition to being examined and evaluated by NP.  All elements of the note were edited where appropriate.

## 2018-09-11 NOTE — PROGRESS NOTE ADULT - ASSESSMENT
Anemia due to acute blood loss.  Plan: unclear etiology  guiac+  transfused 3 units  gi consult appreciated  CT abdomen reviewed  sp EGD  cplonscopy as per GI    Elevated troponin.  Plan: Likely demand ischemia  cards consult  cw home meds  hold asa.     Fever  monitor off antibiotics  ID consult  fu cultures

## 2018-09-11 NOTE — PROGRESS NOTE ADULT - SUBJECTIVE AND OBJECTIVE BOX
Patient is a 67y old  Male who presents with a chief complaint of Anemia (11 Sep 2018 07:57)       Pt is seen and examined  pt is awake and lying in bed/out of bed to chair  pt seems comfortable and denies any complaints at this time    HPI:  67y M hx of alcohol abuse (denies drinking), anemia never required blood transfusion, prior hemorrhagic stroke here with generalized weakness, fatigue, exertional syncope. Pt states 4-5 days ago walked into grocery store from parking lot and passed out in threshold. States did not hit head. States drank some fluids, declines EMS transport and went about his shopping, When he got home he walked back and forth to house w 4 loads of groceries and afterward "passed out" again on his bed, no head strike. States in last several days has felt generally weak, fatigue, dyspnea w minimal exertion. States he intermittently takes ASA "when he has a heart pain." States he has never had an EGD or colonoscopy. (06 Sep 2018 17:22)         ROS:  Negative except for:    MEDICATIONS  (STANDING):  docusate sodium 100 milliGRAM(s) Oral two times a day  ferrous    sulfate 325 milliGRAM(s) Oral two times a day  fluticasone propionate 50 MICROgram(s)/spray Nasal Spray 1 Spray(s) Both Nostrils two times a day  influenza   Vaccine 0.5 milliLiter(s) IntraMuscular once  iron sucrose Injectable 100 milliGRAM(s) IV Push every 24 hours  metoprolol tartrate 25 milliGRAM(s) Oral two times a day  mirtazapine 30 milliGRAM(s) Oral at bedtime  pantoprazole    Tablet 40 milliGRAM(s) Oral before breakfast  phenytoin   Capsule 200 milliGRAM(s) Oral two times a day  sertraline 50 milliGRAM(s) Oral daily  simvastatin 20 milliGRAM(s) Oral at bedtime  sucralfate 1 Gram(s) Oral two times a day  tamsulosin 0.4 milliGRAM(s) Oral at bedtime    MEDICATIONS  (PRN):  acetaminophen   Tablet .. 650 milliGRAM(s) Oral every 6 hours PRN Mild Pain (1 - 3)  acetaminophen   Tablet .. 650 milliGRAM(s) Oral once PRN Mild Pain (1 - 3), Moderate Pain (4 - 6)  ondansetron Injectable 4 milliGRAM(s) IV Push every 6 hours PRN Nausea      Allergies    No Known Allergies    Intolerances        Vital Signs Last 24 Hrs  T(C): 37.6 (11 Sep 2018 08:08), Max: 37.6 (11 Sep 2018 08:08)  T(F): 99.7 (11 Sep 2018 08:08), Max: 99.7 (11 Sep 2018 08:08)  HR: 82 (11 Sep 2018 08:08) (82 - 106)  BP: 123/73 (11 Sep 2018 08:08) (103/68 - 154/73)  BP(mean): --  RR: 16 (11 Sep 2018 08:08) (16 - 20)  SpO2: 98% (11 Sep 2018 08:08) (96% - 99%)    PHYSICAL EXAM  General: adult in NAD  HEENT: clear oropharynx, anicteric sclera, pink conjunctiva  Neck: supple  CV: normal S1/S2 with no murmur rubs or gallops  Lungs: positive air movement b/l ant lungs,clear to auscultation, no wheezes, no rales  Abdomen: soft non-tender non-distended, no hepatosplenomegaly  Ext: no clubbing cyanosis or edema  Skin: no rashes and no petechiae  Neuro: alert and oriented X 4, no focal deficits  LABS:                          9.3    11.00 )-----------( 453      ( 11 Sep 2018 06:29 )             28.6         Mean Cell Volume : 91.4 fl  Mean Cell Hemoglobin : 29.7 pg  Mean Cell Hemoglobin Concentration : 32.5 gm/dL  Auto Neutrophil # : x  Auto Lymphocyte # : x  Auto Monocyte # : x  Auto Eosinophil # : x  Auto Basophil # : x  Auto Neutrophil % : x  Auto Lymphocyte % : x  Auto Monocyte % : x  Auto Eosinophil % : x  Auto Basophil % : x    Serial CBC's  09-11 @ 06:29  Hct-28.6 / Hgb-9.3 / Plat-453 / RBC-3.13 / WBC-11.00          Serial CBC's  09-10 @ 06:44  Hct-29.2 / Hgb-9.4 / Plat-447 / RBC-3.19 / WBC-9.58          Serial CBC's  09-09 @ 08:17  Hct-27.6 / Hgb-9.1 / Plat-394 / RBC-3.11 / WBC-11.75          Serial CBC's  09-08 @ 06:36  Hct-26.0 / Hgb-8.5 / Plat-349 / RBC-2.92 / WBC-11.69          Serial CBC's  09-07 @ 17:15  Hct-24.8 / Hgb-8.2 / Plat-308 / RBC-2.81 / WBC-11.81            09-11    137  |  103  |  20  ----------------------------<  106<H>  4.7   |  27  |  1.10    Ca    8.6      11 Sep 2018 06:29    TPro  6.6  /  Alb  2.9<L>  /  TBili  0.2  /  DBili  x   /  AST  20  /  ALT  41  /  AlkPhos  104  09-11          Iron - Total Binding Capacity.: 346 ug/dL (09-08-18 @ 10:09)  Ferritin, Serum: 54 ng/mL (09-08-18 @ 10:09)  Vitamin B12, Serum: 1214 pg/mL (09-08-18 @ 10:09)  Folate, Serum: 14.6 ng/mL (09-08-18 @ 10:09)  Reticulocyte Percent: 5.7 % (09-08-18 @ 06:36)  Iron - Total Binding Capacity.: 322 ug/dL (09-07-18 @ 08:23)                BLOOD SMEAR INTERPRETATION:       RADIOLOGY & ADDITIONAL STUDIES: Patient is a 67y old  Male who presents with a chief complaint of Anemia (11 Sep 2018 07:57)       Pt is seen and examined  pt is awake and lying in bed  pt seems comfortable and denies any complaints at this time  gi is following patient and pt underwent egd --duodenal ulcer/gastritis--bx--result--pen, colonoscopy/further gi work up and follow up as per gi    HPI:  67y M hx of alcohol abuse (denies drinking), anemia never required blood transfusion, prior hemorrhagic stroke here with generalized weakness, fatigue, exertional syncope. Pt states 4-5 days ago walked into grocery store from parking lot and passed out in threshold. States did not hit head. States drank some fluids, declines EMS transport and went about his shopping, When he got home he walked back and forth to house w 4 loads of groceries and afterward "passed out" again on his bed, no head strike. States in last several days has felt generally weak, fatigue, dyspnea w minimal exertion. States he intermittently takes ASA "when he has a heart pain." States he has never had an EGD or colonoscopy. (06 Sep 2018 17:22)         ROS:  Negative except for:    MEDICATIONS  (STANDING):  docusate sodium 100 milliGRAM(s) Oral two times a day  ferrous    sulfate 325 milliGRAM(s) Oral two times a day  fluticasone propionate 50 MICROgram(s)/spray Nasal Spray 1 Spray(s) Both Nostrils two times a day  influenza   Vaccine 0.5 milliLiter(s) IntraMuscular once  iron sucrose Injectable 100 milliGRAM(s) IV Push every 24 hours  metoprolol tartrate 25 milliGRAM(s) Oral two times a day  mirtazapine 30 milliGRAM(s) Oral at bedtime  pantoprazole    Tablet 40 milliGRAM(s) Oral before breakfast  phenytoin   Capsule 200 milliGRAM(s) Oral two times a day  sertraline 50 milliGRAM(s) Oral daily  simvastatin 20 milliGRAM(s) Oral at bedtime  sucralfate 1 Gram(s) Oral two times a day  tamsulosin 0.4 milliGRAM(s) Oral at bedtime    MEDICATIONS  (PRN):  acetaminophen   Tablet .. 650 milliGRAM(s) Oral every 6 hours PRN Mild Pain (1 - 3)  acetaminophen   Tablet .. 650 milliGRAM(s) Oral once PRN Mild Pain (1 - 3), Moderate Pain (4 - 6)  ondansetron Injectable 4 milliGRAM(s) IV Push every 6 hours PRN Nausea      Allergies    No Known Allergies    Intolerances        Vital Signs Last 24 Hrs  T(C): 37.6 (11 Sep 2018 08:08), Max: 37.6 (11 Sep 2018 08:08)  T(F): 99.7 (11 Sep 2018 08:08), Max: 99.7 (11 Sep 2018 08:08)  HR: 82 (11 Sep 2018 08:08) (82 - 106)  BP: 123/73 (11 Sep 2018 08:08) (103/68 - 154/73)  BP(mean): --  RR: 16 (11 Sep 2018 08:08) (16 - 20)  SpO2: 98% (11 Sep 2018 08:08) (96% - 99%)    PHYSICAL EXAM  General: adult in NAD  HEENT: clear oropharynx, anicteric sclera, pink conjunctiva  Neck: supple  CV: normal S1/S2 with no murmur rubs or gallops  Lungs: positive air movement b/l ant lungs,clear to auscultation, no wheezes, no rales  Abdomen: soft non-tender non-distended, no hepatosplenomegaly  Ext: no clubbing cyanosis or edema  Skin: no rashes and no petechiae  Neuro: alert and oriented X 4, no focal deficits  LABS:                          9.3    11.00 )-----------( 453      ( 11 Sep 2018 06:29 )             28.6         Mean Cell Volume : 91.4 fl  Mean Cell Hemoglobin : 29.7 pg  Mean Cell Hemoglobin Concentration : 32.5 gm/dL  Auto Neutrophil # : x  Auto Lymphocyte # : x  Auto Monocyte # : x  Auto Eosinophil # : x  Auto Basophil # : x  Auto Neutrophil % : x  Auto Lymphocyte % : x  Auto Monocyte % : x  Auto Eosinophil % : x  Auto Basophil % : x    Serial CBC's  09-11 @ 06:29  Hct-28.6 / Hgb-9.3 / Plat-453 / RBC-3.13 / WBC-11.00          Serial CBC's  09-10 @ 06:44  Hct-29.2 / Hgb-9.4 / Plat-447 / RBC-3.19 / WBC-9.58          Serial CBC's  09-09 @ 08:17  Hct-27.6 / Hgb-9.1 / Plat-394 / RBC-3.11 / WBC-11.75          Serial CBC's  09-08 @ 06:36  Hct-26.0 / Hgb-8.5 / Plat-349 / RBC-2.92 / WBC-11.69          Serial CBC's  09-07 @ 17:15  Hct-24.8 / Hgb-8.2 / Plat-308 / RBC-2.81 / WBC-11.81            09-11    137  |  103  |  20  ----------------------------<  106<H>  4.7   |  27  |  1.10    Ca    8.6      11 Sep 2018 06:29    TPro  6.6  /  Alb  2.9<L>  /  TBili  0.2  /  DBili  x   /  AST  20  /  ALT  41  /  AlkPhos  104  09-11          Iron - Total Binding Capacity.: 346 ug/dL (09-08-18 @ 10:09)  Ferritin, Serum: 54 ng/mL (09-08-18 @ 10:09)  Vitamin B12, Serum: 1214 pg/mL (09-08-18 @ 10:09)  Folate, Serum: 14.6 ng/mL (09-08-18 @ 10:09)  Reticulocyte Percent: 5.7 % (09-08-18 @ 06:36)  Iron - Total Binding Capacity.: 322 ug/dL (09-07-18 @ 08:23)

## 2018-09-11 NOTE — PROGRESS NOTE ADULT - SUBJECTIVE AND OBJECTIVE BOX
ID progress note     Name: VERNON FRY  Age: 67y  Gender: Male  MRN: [id number]    Interval History-- No new events , feels better. Afebrile . GI and hematology follow up noted, await biopsy results from EGD  Notes reviewed    Past Medical History--  Alcohol abuse  HTN (hypertension)  Hyponatremia  FTT (failure to thrive) in adult  Seizure  Anemia  Retention of urine  UTI (lower urinary tract infection)  Depression  CVA (cerebral infarction)  Backache  Retinal arterial branch occlusion, right  Enlarged tonsils  No significant past surgical history      For details regarding the patient's social history, family history, and other miscellaneous elements, please refer the initial infectious diseases consultation and/or the admitting history and physical examination for this admission.    Allergies--  Allergies    No Known Allergies    Intolerances        Medications--  Antibiotics:    Immunologic:  influenza   Vaccine 0.5 milliLiter(s) IntraMuscular once    Other:  acetaminophen   Tablet .. PRN  acetaminophen   Tablet .. PRN  bisacodyl  docusate sodium  ferrous    sulfate  fluticasone propionate 50 MICROgram(s)/spray Nasal Spray  metoprolol tartrate  mirtazapine  ondansetron Injectable PRN  pantoprazole    Tablet  phenytoin   Capsule  polyethylene glycol/electrolyte Solution  sertraline  simvastatin  sucralfate  tamsulosin      Review of Systems--  A 10-point review of systems was obtained.     Pertinent positives and negatives--  Constitutional: No fevers. No Chills. No Rigors.   Cardiovascular: No chest pain. No palpitations.  Respiratory: No shortness of breath. No cough.  Gastrointestinal: No nausea or vomiting. No diarrhea or constipation.   Psychiatric: Pleasant. Appropriate affect.    Review of systems otherwise negative except as previously noted.    Physical Examination--  Vital Signs: T(F): 99.7 (09-11-18 @ 08:08), Max: 99.7 (09-11-18 @ 08:08)  HR: 82 (09-11-18 @ 08:08)  BP: 123/73 (09-11-18 @ 08:08)  RR: 16 (09-11-18 @ 08:08)  SpO2: 98% (09-11-18 @ 08:08)  Wt(kg): --  General: Nontoxic-appearing Male in no acute distress.  HEENT: AT/NC. PERRL. EOMI. Anicteric. Conjunctiva pink and moist. Oropharynx clear. Dentition fair.  Neck: Not rigid. No sense of mass.  Nodes: None palpable.  Lungs: Clear bilaterally without rales, wheezing or rhonchi  Heart: Regular rate and rhythm. No Murmur. No rub. No gallop. No palpable thrill.  Abdomen: Bowel sounds present and normoactive. Soft. Nondistended. Nontender. No sense of mass. No organomegaly.  Back: No spinal tenderness. No costovertebral angle tenderness.   Extremities: No cyanosis or clubbing. No edema.   Skin: Warm. Dry. Good turgor. No rash. No vasculitic stigmata.  Psychiatric: Appropriate affect and mood for situation.         Laboratory Studies--  CBC                        9.3    11.00 )-----------( 453      ( 11 Sep 2018 06:29 )             28.6       Chemistries  09-11    137  |  103  |  20  ----------------------------<  106<H>  4.7   |  27  |  1.10    Ca    8.6      11 Sep 2018 06:29    TPro  6.6  /  Alb  2.9<L>  /  TBili  0.2  /  DBili  x   /  AST  20  /  ALT  41  /  AlkPhos  104  09-11      Culture Data    Culture - Urine (collected 07 Sep 2018 14:52)  Source: .Urine Clean Catch (Midstream)  Final Report (08 Sep 2018 10:17):    No growth    Culture - Blood (collected 07 Sep 2018 10:57)  Source: .Blood Blood  Preliminary Report (08 Sep 2018 11:01):    No growth to date.    Culture - Blood (collected 07 Sep 2018 10:57)  Source: .Blood Blood  Preliminary Report (08 Sep 2018 11:01):    No growth to date.    Assessment :   67y M hx of alcohol abuse, anemia never required blood transfusion, hx hemorrhagic CVA who  presented with fall and syncopal episode found to have severe anemia  Rule out GIB  Sp EGD showed duodenal ulcer   Fever and leukocytosis likely reactive now resolved   Septic work up ngtd  Doubt infected  Seen by GI  Also with + troponins    Plan :   will monitor off antibiotics  Trend temps and wbc  Trend H/H  Increase activity  follow EGD biopsy of duodenal ulcer     Continue with present regiment.  Appropriate use of antibiotics and adverse effects reviewed.      I have discussed the above plan of care with patient  in detail. He expressed understanding of the treatment plan . Risks, benefits and alternatives discussed in detail. I have asked if he has  any questions or concerns and appropriately addressed them to the best of my ability .      > 25 minutes spent in direct patient care reviewing  the notes, lab data/ imaging , discussion with multidisciplinary team. All questions were addressed and answered to the best of my capacity .    Thank you for allowing me to participate in the care of your patient .        Misty Saha MD  [phone]

## 2018-09-11 NOTE — PROGRESS NOTE ADULT - ASSESSMENT
67y M hx of alcohol abuse (denies drinking), anemia never required blood transfusion, prior hemorrhagic stroke here with generalized weakness, fatigue, exertional syncope was admitted after had syncopal episode. patient is seen by gi and is undergoing gi work up  patient is with anemia and hematology was consulted,  anemia--likely multifactorial and of gi bleed--seen by gi and is undergoing gi w/u to exclude gi pathology

## 2018-09-12 LAB
ANION GAP SERPL CALC-SCNC: 7 MMOL/L — SIGNIFICANT CHANGE UP (ref 5–17)
BUN SERPL-MCNC: 14 MG/DL — SIGNIFICANT CHANGE UP (ref 7–23)
CALCIUM SERPL-MCNC: 8.4 MG/DL — LOW (ref 8.5–10.1)
CHLORIDE SERPL-SCNC: 105 MMOL/L — SIGNIFICANT CHANGE UP (ref 96–108)
CO2 SERPL-SCNC: 29 MMOL/L — SIGNIFICANT CHANGE UP (ref 22–31)
CREAT SERPL-MCNC: 1 MG/DL — SIGNIFICANT CHANGE UP (ref 0.5–1.3)
CULTURE RESULTS: SIGNIFICANT CHANGE UP
CULTURE RESULTS: SIGNIFICANT CHANGE UP
GLUCOSE SERPL-MCNC: 95 MG/DL — SIGNIFICANT CHANGE UP (ref 70–99)
HCT VFR BLD CALC: 26.6 % — LOW (ref 39–50)
HGB BLD-MCNC: 8.8 G/DL — LOW (ref 13–17)
MCHC RBC-ENTMCNC: 30.2 PG — SIGNIFICANT CHANGE UP (ref 27–34)
MCHC RBC-ENTMCNC: 33.1 GM/DL — SIGNIFICANT CHANGE UP (ref 32–36)
MCV RBC AUTO: 91.4 FL — SIGNIFICANT CHANGE UP (ref 80–100)
NRBC # BLD: 0 /100 WBCS — SIGNIFICANT CHANGE UP (ref 0–0)
PLATELET # BLD AUTO: 405 K/UL — HIGH (ref 150–400)
POTASSIUM SERPL-MCNC: 4.1 MMOL/L — SIGNIFICANT CHANGE UP (ref 3.5–5.3)
POTASSIUM SERPL-SCNC: 4.1 MMOL/L — SIGNIFICANT CHANGE UP (ref 3.5–5.3)
RBC # BLD: 2.91 M/UL — LOW (ref 4.2–5.8)
RBC # FLD: 17.7 % — HIGH (ref 10.3–14.5)
SODIUM SERPL-SCNC: 141 MMOL/L — SIGNIFICANT CHANGE UP (ref 135–145)
SPECIMEN SOURCE: SIGNIFICANT CHANGE UP
SPECIMEN SOURCE: SIGNIFICANT CHANGE UP
WBC # BLD: 6.68 K/UL — SIGNIFICANT CHANGE UP (ref 3.8–10.5)
WBC # FLD AUTO: 6.68 K/UL — SIGNIFICANT CHANGE UP (ref 3.8–10.5)

## 2018-09-12 PROCEDURE — 99232 SBSQ HOSP IP/OBS MODERATE 35: CPT

## 2018-09-12 PROCEDURE — 93010 ELECTROCARDIOGRAM REPORT: CPT

## 2018-09-12 RX ADMIN — Medication 1 GRAM(S): at 18:08

## 2018-09-12 RX ADMIN — Medication 1 SPRAY(S): at 05:58

## 2018-09-12 RX ADMIN — Medication 325 MILLIGRAM(S): at 18:08

## 2018-09-12 RX ADMIN — TAMSULOSIN HYDROCHLORIDE 0.4 MILLIGRAM(S): 0.4 CAPSULE ORAL at 21:06

## 2018-09-12 RX ADMIN — Medication 100 MILLIGRAM(S): at 05:58

## 2018-09-12 RX ADMIN — Medication 25 MILLIGRAM(S): at 18:09

## 2018-09-12 RX ADMIN — Medication 1 GRAM(S): at 05:58

## 2018-09-12 RX ADMIN — Medication 100 MILLIGRAM(S): at 18:08

## 2018-09-12 RX ADMIN — Medication 200 MILLIGRAM(S): at 18:09

## 2018-09-12 RX ADMIN — Medication 1 SPRAY(S): at 18:09

## 2018-09-12 RX ADMIN — PANTOPRAZOLE SODIUM 40 MILLIGRAM(S): 20 TABLET, DELAYED RELEASE ORAL at 05:57

## 2018-09-12 RX ADMIN — SIMVASTATIN 20 MILLIGRAM(S): 20 TABLET, FILM COATED ORAL at 21:06

## 2018-09-12 RX ADMIN — MIRTAZAPINE 30 MILLIGRAM(S): 45 TABLET, ORALLY DISINTEGRATING ORAL at 21:06

## 2018-09-12 RX ADMIN — Medication 200 MILLIGRAM(S): at 05:58

## 2018-09-12 NOTE — PROGRESS NOTE ADULT - PROBLEM SELECTOR PLAN 1
cont proton pump inhibitor   cont carafate  maalox prn  diet as tolerated  repeat  upper gastrointestinal endoscopy in 8 weeks to confirm healing +/- colonoscopy  avoid nsaids
sp egd with large necrotic ulcer in duodenal bulb, no active bleeding or stigmata of recent bleeding appreciated  f/u bxs  hgb trend noted, no further s/s overt gib per nursing  trend h/h, transfuse prn  cont ppi, carafate  maalox prn  diet as tolerated  avoid nsaids  heme/onc following  will need close outpatient gi f/u and repeat egd in 8 weeks to confirm healing + colonoscopy, timing to be dw further w attg
sp egd with large necrotic ulcer in duodenal bulb, no active bleeding or stigmata of recent bleeding appreciated  path- duodenal bx neg for malignancy, gastric antral mucosa with focal intestinal metaplasia showing mild active chronic gastritis, h pylori neg  hgb trend noted, no further s/s overt gib per nursing  trend h/h, transfuse prn  cont ppi, carafate  maalox prn  diet as tolerated  avoid nsaids  heme/onc following  will need close outpatient gi f/u and repeat egd in 8 weeks to confirm healing
anemia of gi bleed  gi is following and gi w/u as per gi/pt  s/p egd--large duodenal ulcer--f/u cytopath  on iv venofer--day 2 (2/3)  monitor h/h
anemia--multifactorial and anemia of gi bleed  gi is following and gi w/u as per gi/pt  s/p egd--large duodenal ulcer/gastritis--f/u cytopath  on iv venofer--day 3 (3/3)  monitor h/h.
anemia--multifactorial and anemia of gi bleed  gi is following and gi w/u as per gi/pt  s/p egd--large duodenal ulcer/gastritis--negative cytopath  s/p iv venofer 9/9---9/11  monitor serial h/h.

## 2018-09-12 NOTE — DIETITIAN INITIAL EVALUATION ADULT. - NS AS NUTRI INTERV MEALS SNACK
advance to clear liquids to GERD DASH TLC diet as appropriate. await colonoscopy results for further diet restrictions/Other (specify)

## 2018-09-12 NOTE — PHYSICAL THERAPY INITIAL EVALUATION ADULT - ADDITIONAL COMMENTS
Pt lives with family in private home, 3 JEFF with handrail, and resides on main level of home.  Pt was independent in all ADLs prior to hospitalization, and ambulated independently without a device.  Pt has 2 RW at home if needed.

## 2018-09-12 NOTE — PROGRESS NOTE ADULT - SUBJECTIVE AND OBJECTIVE BOX
Patient is a 67y old  Male who presents with a chief complaint of Anemia (11 Sep 2018 15:54)      INTERVAL HPI/OVERNIGHT EVENTS:  T(C): 37.2 (09-12-18 @ 07:39), Max: 37.6 (09-11-18 @ 08:08)  HR: 101 (09-12-18 @ 07:39) (74 - 101)  BP: 129/78 (09-12-18 @ 07:39) (96/65 - 129/78)  RR: 19 (09-12-18 @ 07:39) (16 - 19)  SpO2: 96% (09-12-18 @ 07:39) (96% - 98%)  Wt(kg): --  I&O's Summary    11 Sep 2018 07:01  -  12 Sep 2018 07:00  --------------------------------------------------------  IN: 720 mL / OUT: 2 mL / NET: 718 mL        LABS:                        8.8    6.68  )-----------( 405      ( 12 Sep 2018 06:16 )             26.6     09-12    141  |  105  |  14  ----------------------------<  95  4.1   |  29  |  1.00    Ca    8.4<L>      12 Sep 2018 06:16    TPro  6.6  /  Alb  2.9<L>  /  TBili  0.2  /  DBili  x   /  AST  20  /  ALT  41  /  AlkPhos  104  09-11        CAPILLARY BLOOD GLUCOSE                MEDICATIONS  (STANDING):  docusate sodium 100 milliGRAM(s) Oral two times a day  ferrous    sulfate 325 milliGRAM(s) Oral two times a day  fluticasone propionate 50 MICROgram(s)/spray Nasal Spray 1 Spray(s) Both Nostrils two times a day  influenza   Vaccine 0.5 milliLiter(s) IntraMuscular once  metoprolol tartrate 25 milliGRAM(s) Oral two times a day  mirtazapine 30 milliGRAM(s) Oral at bedtime  pantoprazole    Tablet 40 milliGRAM(s) Oral before breakfast  phenytoin   Capsule 200 milliGRAM(s) Oral two times a day  sertraline 50 milliGRAM(s) Oral daily  simvastatin 20 milliGRAM(s) Oral at bedtime  sodium biphosphate Rectal Enema 1 Enema Rectal once  sucralfate 1 Gram(s) Oral two times a day  tamsulosin 0.4 milliGRAM(s) Oral at bedtime    MEDICATIONS  (PRN):  acetaminophen   Tablet .. 650 milliGRAM(s) Oral every 6 hours PRN Mild Pain (1 - 3)  acetaminophen   Tablet .. 650 milliGRAM(s) Oral once PRN Mild Pain (1 - 3), Moderate Pain (4 - 6)  ondansetron Injectable 4 milliGRAM(s) IV Push every 6 hours PRN Nausea          PHYSICAL EXAM:  GENERAL: NAD, well-groomed, well-developed  HEAD:  Atraumatic, Normocephalic  CHEST/LUNG: Clear to percussion bilaterally; No rales, rhonchi, wheezing, or rubs  HEART: Regular rate and rhythm; No murmurs, rubs, or gallops  ABDOMEN: Soft, Nontender, Nondistended; Bowel sounds present  EXTREMITIES:  2+ Peripheral Pulses, No clubbing, cyanosis, or edema  LYMPH: No lymphadenopathy noted  SKIN: No rashes or lesions    Care Discussed with Consultants/Other Providers [ ] YES  [ ] NO

## 2018-09-12 NOTE — PROGRESS NOTE ADULT - ASSESSMENT
67y M hx of alcohol abuse (denies drinking), anemia never required blood transfusion, prior hemorrhagic stroke here with generalized weakness, fatigue, exertional syncope was admitted after had syncopal episode. patient is seen by gi and is undergoing gi work up  patient is with anemia and hematology was consulted,  anemia--likely multifactorial and of gi bleed--seen by gi and is undergoing gi w/u to exclude gi pathology    s/p egd----duodenal ulcer--pathology--negative for malignancy  s/p colonoscopy 9/12/18

## 2018-09-12 NOTE — PROGRESS NOTE ADULT - PROBLEM SELECTOR PLAN 2
see above  pt now agreeable to inhouse colon  plan for tomorrow, clears today, bowel prep as ordered, npo p mn, cardiology following, please medically optimize
as per gi----"sp egd with large necrotic ulcer in duodenal bulb, no active bleeding or stigmata of recent bleeding appreciated,,,,,,,,,,will need close outpatient gi f/u and repeat egd in 8 weeks to confirm healing + colonoscopy, timing to be dw further w attg."---------------  patient is on ppi/carafate/maalox prn  gi w/u, treatment per gi  d/w pt at bedside.
as per gi----"sp egd with large necrotic ulcer in duodenal bulb, no active bleeding or stigmata of recent bleeding appreciated,,,,,,,,,,will need close outpatient gi f/u and repeat egd in 8 weeks to confirm healing + colonoscopy, timing to be dw further w attg."---------------  patient is on ppi/carafate/maalox prn  gi w/u/ treatment per gi  d/w pt at bedside.
gi w/u, treatment per gi  d/w pt at bedside

## 2018-09-12 NOTE — PROGRESS NOTE ADULT - ASSESSMENT
nemia due to acute blood loss.  Plan: unclear etiology  guiac+  transfused 3 units  gi consult appreciated  CT abdomen reviewed  sp EGD  colonscopy as per GI    Elevated troponin.  Plan: Likely demand ischemia  cards consult  cw home meds  hold asa.     Fever  monitor off antibiotics  ID consult  fu cultures    dc planning in am if cbc remains stable, downtrending today

## 2018-09-12 NOTE — PROGRESS NOTE ADULT - SUBJECTIVE AND OBJECTIVE BOX
[facility] - Jona Bush, Alix, Ghada, Jian, Cindy Hope  Office Number:  [phone]    Patient resting comfortably in bed in NAD.  Laying flat with no respiratory distress.  No complaints of chest pain, dyspnea, palpitations, PND, or orthopnea.  Reports foot numbness    ROS: negative unless otherwise mentioned.    Telemetry:  not on tele    MEDICATIONS  (STANDING):  docusate sodium 100 milliGRAM(s) Oral two times a day  ferrous    sulfate 325 milliGRAM(s) Oral two times a day  fluticasone propionate 50 MICROgram(s)/spray Nasal Spray 1 Spray(s) Both Nostrils two times a day  influenza   Vaccine 0.5 milliLiter(s) IntraMuscular once  metoprolol tartrate 25 milliGRAM(s) Oral two times a day  mirtazapine 30 milliGRAM(s) Oral at bedtime  pantoprazole    Tablet 40 milliGRAM(s) Oral before breakfast  phenytoin   Capsule 200 milliGRAM(s) Oral two times a day  sertraline 50 milliGRAM(s) Oral daily  simvastatin 20 milliGRAM(s) Oral at bedtime  sucralfate 1 Gram(s) Oral two times a day  tamsulosin 0.4 milliGRAM(s) Oral at bedtime    MEDICATIONS  (PRN):  acetaminophen   Tablet .. 650 milliGRAM(s) Oral every 6 hours PRN Mild Pain (1 - 3)  acetaminophen   Tablet .. 650 milliGRAM(s) Oral once PRN Mild Pain (1 - 3), Moderate Pain (4 - 6)  ondansetron Injectable 4 milliGRAM(s) IV Push every 6 hours PRN Nausea      Allergies    No Known Allergies    Intolerances        Vital Signs Last 24 Hrs  T(C): 37.2 (12 Sep 2018 07:39), Max: 37.4 (11 Sep 2018 20:26)  T(F): 99 (12 Sep 2018 07:39), Max: 99.3 (11 Sep 2018 20:26)  HR: 101 (12 Sep 2018 07:39) (74 - 101)  BP: 129/78 (12 Sep 2018 07:39) (96/65 - 129/78)  BP(mean): --  RR: 19 (12 Sep 2018 07:39) (16 - 19)  SpO2: 96% (12 Sep 2018 07:39) (96% - 98%)    I&O's Summary    11 Sep 2018 07:01  -  12 Sep 2018 07:00  --------------------------------------------------------  IN: 720 mL / OUT: 2 mL / NET: 718 mL        ON EXAM:    Appearance: [ ] Normal  [ ] abnormal [X ] NAD   Eyes: [ ] PERRL [ ] EOMI  HEENT: [ ] Normal [ ] Abnormal oral mucosa [ ]NC/AT  Cardiovascular: [X ] S1 [ X] S2 [ ] RRR [ ] m/r/g [ ]edema [ ] JVP  Procedural Access Site: [ ]  hematoma [ ] tender to palpation [ ] 2+ pulse [ ] bruit [ ] Ecchymosis  Respiratory: [X ] Clear to auscultation bilaterally  Gastrointestinal: [ ] Soft [ ] tenderness[ ] distension [ ] BS  Musculoskeletal: [ ] clubbing [ ] joint deformity   Neurologic: [ ] Non-focal  Lymphatic: [ ] lymphadenopathy  Psychiatry: [X ] AAOx3  [ ] confused [ ] disoriented [ ] Mood & affect appropriate  Skin: [ ]  rashes [ ] ecchymoses [ ] cyanosis    LABS: All Labs Reviewed:                        8.8    6.68  )-----------( 405      ( 12 Sep 2018 06:16 )             26.6                         9.3    11.00 )-----------( 453      ( 11 Sep 2018 06:29 )             28.6                         9.4    9.58  )-----------( 447      ( 10 Sep 2018 06:44 )             29.2     12 Sep 2018 06:16    141    |  105    |  14     ----------------------------<  95     4.1     |  29     |  1.00   11 Sep 2018 06:29    137    |  103    |  20     ----------------------------<  106    4.7     |  27     |  1.10   10 Sep 2018 06:44    136    |  102    |  16     ----------------------------<  109    4.5     |  25     |  1.20     Ca    8.4        12 Sep 2018 06:16  Ca    8.6        11 Sep 2018 06:29  Ca    9.3        10 Sep 2018 06:44    TPro  6.6    /  Alb  2.9    /  TBili  0.2    /  DBili  x      /  AST  20     /  ALT  41     /  AlkPhos  104    11 Sep 2018 06:29  TPro  6.6    /  Alb  2.9    /  TBili  0.3    /  DBili  x      /  AST  21     /  ALT  38     /  AlkPhos  103    10 Sep 2018 06:44          Blood Culture: Organism --  Gram Stain Blood -- Gram Stain --  Specimen Source .Urine Clean Catch (Midstream)  Culture-Blood --    Organism --  Gram Stain Blood -- Gram Stain --  Specimen Source .Blood Blood  Culture-Blood --

## 2018-09-12 NOTE — PROGRESS NOTE ADULT - SUBJECTIVE AND OBJECTIVE BOX
Patient is a 67y old  Male who presents with a chief complaint of Anemia (12 Sep 2018 08:58)       Pt is seen and examined  pt is awake and lying in bed/out of bed to chair  pt seems comfortable and denies any complaints at this time    HPI:  67y M hx of alcohol abuse (denies drinking), anemia never required blood transfusion, prior hemorrhagic stroke here with generalized weakness, fatigue, exertional syncope. Pt states 4-5 days ago walked into grocery store from parking lot and passed out in threshold. States did not hit head. States drank some fluids, declines EMS transport and went about his shopping, When he got home he walked back and forth to house w 4 loads of groceries and afterward "passed out" again on his bed, no head strike. States in last several days has felt generally weak, fatigue, dyspnea w minimal exertion. States he intermittently takes ASA "when he has a heart pain." States he has never had an EGD or colonoscopy. (06 Sep 2018 17:22)         ROS:  Negative except for:    MEDICATIONS  (STANDING):  docusate sodium 100 milliGRAM(s) Oral two times a day  ferrous    sulfate 325 milliGRAM(s) Oral two times a day  fluticasone propionate 50 MICROgram(s)/spray Nasal Spray 1 Spray(s) Both Nostrils two times a day  influenza   Vaccine 0.5 milliLiter(s) IntraMuscular once  metoprolol tartrate 25 milliGRAM(s) Oral two times a day  mirtazapine 30 milliGRAM(s) Oral at bedtime  pantoprazole    Tablet 40 milliGRAM(s) Oral before breakfast  phenytoin   Capsule 200 milliGRAM(s) Oral two times a day  sertraline 50 milliGRAM(s) Oral daily  simvastatin 20 milliGRAM(s) Oral at bedtime  sucralfate 1 Gram(s) Oral two times a day  tamsulosin 0.4 milliGRAM(s) Oral at bedtime    MEDICATIONS  (PRN):  acetaminophen   Tablet .. 650 milliGRAM(s) Oral every 6 hours PRN Mild Pain (1 - 3)  acetaminophen   Tablet .. 650 milliGRAM(s) Oral once PRN Mild Pain (1 - 3), Moderate Pain (4 - 6)  ondansetron Injectable 4 milliGRAM(s) IV Push every 6 hours PRN Nausea      Allergies    No Known Allergies    Intolerances        Vital Signs Last 24 Hrs  T(C): 36.9 (12 Sep 2018 16:37), Max: 37.4 (11 Sep 2018 20:26)  T(F): 98.5 (12 Sep 2018 16:37), Max: 99.3 (11 Sep 2018 20:26)  HR: 112 (12 Sep 2018 16:37) (80 - 115)  BP: 143/88 (12 Sep 2018 16:37) (96/65 - 151/79)  BP(mean): --  RR: 17 (12 Sep 2018 16:37) (16 - 20)  SpO2: 99% (12 Sep 2018 16:37) (96% - 99%)    PHYSICAL EXAM  General: adult in NAD  HEENT: clear oropharynx, anicteric sclera, pink conjunctiva  Neck: supple  CV: normal S1/S2 with no murmur rubs or gallops  Lungs: positive air movement b/l ant lungs,clear to auscultation, no wheezes, no rales  Abdomen: soft non-tender non-distended, no hepatosplenomegaly  Ext: no clubbing cyanosis or edema  Skin: no rashes and no petechiae  Neuro: alert and oriented X 4, no focal deficits  LABS:                          8.8    6.68  )-----------( 405      ( 12 Sep 2018 06:16 )             26.6         Mean Cell Volume : 91.4 fl  Mean Cell Hemoglobin : 30.2 pg  Mean Cell Hemoglobin Concentration : 33.1 gm/dL  Auto Neutrophil # : x  Auto Lymphocyte # : x  Auto Monocyte # : x  Auto Eosinophil # : x  Auto Basophil # : x  Auto Neutrophil % : x  Auto Lymphocyte % : x  Auto Monocyte % : x  Auto Eosinophil % : x  Auto Basophil % : x    Serial CBC's  09-12 @ 06:16  Hct-26.6 / Hgb-8.8 / Plat-405 / RBC-2.91 / WBC-6.68          Serial CBC's  09-11 @ 06:29  Hct-28.6 / Hgb-9.3 / Plat-453 / RBC-3.13 / WBC-11.00          Serial CBC's  09-10 @ 06:44  Hct-29.2 / Hgb-9.4 / Plat-447 / RBC-3.19 / WBC-9.58          Serial CBC's  09-09 @ 08:17  Hct-27.6 / Hgb-9.1 / Plat-394 / RBC-3.11 / WBC-11.75            09-12    141  |  105  |  14  ----------------------------<  95  4.1   |  29  |  1.00    Ca    8.4<L>      12 Sep 2018 06:16    TPro  6.6  /  Alb  2.9<L>  /  TBili  0.2  /  DBili  x   /  AST  20  /  ALT  41  /  AlkPhos  104  09-11          Iron - Total Binding Capacity.: 346 ug/dL (09-08-18 @ 10:09)  Ferritin, Serum: 54 ng/mL (09-08-18 @ 10:09)  Vitamin B12, Serum: 1214 pg/mL (09-08-18 @ 10:09)  Folate, Serum: 14.6 ng/mL (09-08-18 @ 10:09)  Reticulocyte Percent: 5.7 % (09-08-18 @ 06:36)  Iron - Total Binding Capacity.: 322 ug/dL (09-07-18 @ 08:23)                BLOOD SMEAR INTERPRETATION:       RADIOLOGY & ADDITIONAL STUDIES: Patient is a 67y old  Male who presents with a chief complaint of Anemia (12 Sep 2018 08:58)       Pt is seen and examined  pt is awake and lying in bed  pt seems comfortable and denies any complaints at this time  s/p egd--path--neg for malignancy, colonoscopy per gi    HPI:  67y M hx of alcohol abuse (denies drinking), anemia never required blood transfusion, prior hemorrhagic stroke here with generalized weakness, fatigue, exertional syncope. Pt states 4-5 days ago walked into grocery store from parking lot and passed out in threshold. States did not hit head. States drank some fluids, declines EMS transport and went about his shopping, When he got home he walked back and forth to house w 4 loads of groceries and afterward "passed out" again on his bed, no head strike. States in last several days has felt generally weak, fatigue, dyspnea w minimal exertion. States he intermittently takes ASA "when he has a heart pain." States he has never had an EGD or colonoscopy. (06 Sep 2018 17:22)      MEDICATIONS  (STANDING):  docusate sodium 100 milliGRAM(s) Oral two times a day  ferrous    sulfate 325 milliGRAM(s) Oral two times a day  fluticasone propionate 50 MICROgram(s)/spray Nasal Spray 1 Spray(s) Both Nostrils two times a day  influenza   Vaccine 0.5 milliLiter(s) IntraMuscular once  metoprolol tartrate 25 milliGRAM(s) Oral two times a day  mirtazapine 30 milliGRAM(s) Oral at bedtime  pantoprazole    Tablet 40 milliGRAM(s) Oral before breakfast  phenytoin   Capsule 200 milliGRAM(s) Oral two times a day  sertraline 50 milliGRAM(s) Oral daily  simvastatin 20 milliGRAM(s) Oral at bedtime  sucralfate 1 Gram(s) Oral two times a day  tamsulosin 0.4 milliGRAM(s) Oral at bedtime    MEDICATIONS  (PRN):  acetaminophen   Tablet .. 650 milliGRAM(s) Oral every 6 hours PRN Mild Pain (1 - 3)  acetaminophen   Tablet .. 650 milliGRAM(s) Oral once PRN Mild Pain (1 - 3), Moderate Pain (4 - 6)  ondansetron Injectable 4 milliGRAM(s) IV Push every 6 hours PRN Nausea      Allergies    No Known Allergies    Intolerances        Vital Signs Last 24 Hrs  T(C): 36.9 (12 Sep 2018 16:37), Max: 37.4 (11 Sep 2018 20:26)  T(F): 98.5 (12 Sep 2018 16:37), Max: 99.3 (11 Sep 2018 20:26)  HR: 112 (12 Sep 2018 16:37) (80 - 115)  BP: 143/88 (12 Sep 2018 16:37) (96/65 - 151/79)  BP(mean): --  RR: 17 (12 Sep 2018 16:37) (16 - 20)  SpO2: 99% (12 Sep 2018 16:37) (96% - 99%)    PHYSICAL EXAM  General: adult in NAD  HEENT: clear oropharynx, anicteric sclera, pink conjunctiva  Neck: supple  CV: normal S1/S2 with no murmur rubs or gallops  Lungs: positive air movement b/l ant lungs,clear to auscultation, no wheezes, no rales  Abdomen: soft non-tender non-distended, no hepatosplenomegaly  Ext: no clubbing cyanosis or edema  Skin: no rashes and no petechiae  Neuro: alert and oriented X 4, no focal deficits  LABS:                          8.8    6.68  )-----------( 405      ( 12 Sep 2018 06:16 )             26.6         Mean Cell Volume : 91.4 fl  Mean Cell Hemoglobin : 30.2 pg  Mean Cell Hemoglobin Concentration : 33.1 gm/dL  Auto Neutrophil # : x  Auto Lymphocyte # : x  Auto Monocyte # : x  Auto Eosinophil # : x  Auto Basophil # : x  Auto Neutrophil % : x  Auto Lymphocyte % : x  Auto Monocyte % : x  Auto Eosinophil % : x  Auto Basophil % : x    Serial CBC's  09-12 @ 06:16  Hct-26.6 / Hgb-8.8 / Plat-405 / RBC-2.91 / WBC-6.68          Serial CBC's  09-11 @ 06:29  Hct-28.6 / Hgb-9.3 / Plat-453 / RBC-3.13 / WBC-11.00          Serial CBC's  09-10 @ 06:44  Hct-29.2 / Hgb-9.4 / Plat-447 / RBC-3.19 / WBC-9.58          Serial CBC's  09-09 @ 08:17  Hct-27.6 / Hgb-9.1 / Plat-394 / RBC-3.11 / WBC-11.75            09-12    141  |  105  |  14  ----------------------------<  95  4.1   |  29  |  1.00    Ca    8.4<L>      12 Sep 2018 06:16    TPro  6.6  /  Alb  2.9<L>  /  TBili  0.2  /  DBili  x   /  AST  20  /  ALT  41  /  AlkPhos  104  09-11          Iron - Total Binding Capacity.: 346 ug/dL (09-08-18 @ 10:09)  Ferritin, Serum: 54 ng/mL (09-08-18 @ 10:09)  Vitamin B12, Serum: 1214 pg/mL (09-08-18 @ 10:09)  Folate, Serum: 14.6 ng/mL (09-08-18 @ 10:09)  Reticulocyte Percent: 5.7 % (09-08-18 @ 06:36)  Iron - Total Binding Capacity.: 322 ug/dL (09-07-18 @ 08:23)    Surgical Pathology Final Report -  (09.07.18 @ 09:43)    Surgical Pathology Final Report - :   ACCESSION No:  [id number]    VERNON FRY                         1        Surgical Final Report          Final Diagnosis  1. Small intestinal mucosa with peptic ulceration and acute and  chronic inflammation (duodenal biopsy).  Negative for malignancy.    2. Diff-quick and Alcian blue/PAS stains examined.  Gastric antral mucosa with focal intestinal metaplasia showing  active chronic gastritis, mild.  Special stain for Helicobacter pylori is negative.    Denis Dorantes M.D.  (Electronic Signature)  Reported on: 09/11/18    Clinical Information  Gastritis, duodenal ulcer    Specimen Description  1-Duodenal ulcer biopsy  2-Antral biopsy, rule out H. pylori    Gross Description  1. The specimen is received in formalin and the specimen  container is labeled: Duodenal ulcer biopsy. It consists of  multiple fragments of tan soft tissue measuring in aggregate 0.3  x 0.2 x 0.1 cm. Entirely submitted. One cassette.    2. The specimen is received in formalin and the specimen  container is labeled: Antral biopsy, rule out H. pylori. It  consists of a fragment of tan soft tissue measuring 0.2 cm in  greatest dimension.  Entirely submitted. One cassette.    In addition to other data that may appear on the specimen  containers, all labels have been inspected to confirm the  presence of the patient's name and date of birth.  TIM 9/11/2018 6:51 AM

## 2018-09-12 NOTE — DIETITIAN INITIAL EVALUATION ADULT. - OTHER INFO
patient reports with good PO intake . eats many ethnic spicy foods avoids added salt states not on blood pressure medications at home. noted EGD duodenal ulcer and gastritis. for colonoscopy today.

## 2018-09-12 NOTE — PROGRESS NOTE ADULT - ASSESSMENT
67y old M PMH EtOH abuse, anemia, prior hemorrhagic stroke 2014, HTN, GERD, seizure 2014 who presents with a chief complaint of weakness and syncope. Found to be profoundly anemic, with hemoglobin 4.5, troponins slightly elevated.     #1 Elevated Troponins/CAD  - Mildly elevated troponins but other cardiac biomarkers normal, not consistent of ACS.  It is likely due to demand ischemia from profound anemia   - No arrhythmias on tele, now stopped  - Patient has stable CAD, Continue BB and statin.  Okay to hold ASA for now until cleared by GI  - Echo with normal LVEF  - no cardiac contraindication to proceeding with colonoscopy today.      #2 Syncope  - This is likely from his profound anemia with Hgb of 4.5 on admission   - S/P  PRBC's x 3 units total. H&H remains stable 9.3/28.6  Transfuse prn to keep >7  - S/P EGD large duodenal ulcer   - Continue PPI/ carafate   - Avoid aggravating factors   - GI consult follow up    Will follow with you. He will follow up with us in the office upon d/c.

## 2018-09-12 NOTE — PHYSICAL THERAPY INITIAL EVALUATION ADULT - PERTINENT HX OF CURRENT PROBLEM, REHAB EVAL
67y M hx of alcohol abuse (denies drinking), anemia never required blood transfusion, prior hemorrhagic stroke here with generalized weakness, fatigue, exertional syncope. Pt states 4-5 days ago walked into grocery store from parking lot and passed out in threshold. States did not hit head.

## 2018-09-13 ENCOUNTER — TRANSCRIPTION ENCOUNTER (OUTPATIENT)
Age: 68
End: 2018-09-13

## 2018-09-13 VITALS
OXYGEN SATURATION: 97 % | DIASTOLIC BLOOD PRESSURE: 78 MMHG | SYSTOLIC BLOOD PRESSURE: 126 MMHG | RESPIRATION RATE: 15 BRPM | TEMPERATURE: 98 F | HEART RATE: 73 BPM

## 2018-09-13 LAB
ALBUMIN SERPL ELPH-MCNC: 2.7 G/DL — LOW (ref 3.3–5)
ALP SERPL-CCNC: 111 U/L — SIGNIFICANT CHANGE UP (ref 40–120)
ALT FLD-CCNC: 40 U/L — SIGNIFICANT CHANGE UP (ref 12–78)
ANION GAP SERPL CALC-SCNC: 6 MMOL/L — SIGNIFICANT CHANGE UP (ref 5–17)
AST SERPL-CCNC: 23 U/L — SIGNIFICANT CHANGE UP (ref 15–37)
BILIRUB SERPL-MCNC: 0.3 MG/DL — SIGNIFICANT CHANGE UP (ref 0.2–1.2)
BUN SERPL-MCNC: 17 MG/DL — SIGNIFICANT CHANGE UP (ref 7–23)
CALCIUM SERPL-MCNC: 8.8 MG/DL — SIGNIFICANT CHANGE UP (ref 8.5–10.1)
CHLORIDE SERPL-SCNC: 105 MMOL/L — SIGNIFICANT CHANGE UP (ref 96–108)
CO2 SERPL-SCNC: 27 MMOL/L — SIGNIFICANT CHANGE UP (ref 22–31)
CREAT SERPL-MCNC: 1 MG/DL — SIGNIFICANT CHANGE UP (ref 0.5–1.3)
GLUCOSE SERPL-MCNC: 105 MG/DL — HIGH (ref 70–99)
HCT VFR BLD CALC: 28 % — LOW (ref 39–50)
HGB BLD-MCNC: 9.2 G/DL — LOW (ref 13–17)
MCHC RBC-ENTMCNC: 30.5 PG — SIGNIFICANT CHANGE UP (ref 27–34)
MCHC RBC-ENTMCNC: 32.9 GM/DL — SIGNIFICANT CHANGE UP (ref 32–36)
MCV RBC AUTO: 92.7 FL — SIGNIFICANT CHANGE UP (ref 80–100)
NRBC # BLD: 0 /100 WBCS — SIGNIFICANT CHANGE UP (ref 0–0)
PLATELET # BLD AUTO: 446 K/UL — HIGH (ref 150–400)
POTASSIUM SERPL-MCNC: 4.7 MMOL/L — SIGNIFICANT CHANGE UP (ref 3.5–5.3)
POTASSIUM SERPL-SCNC: 4.7 MMOL/L — SIGNIFICANT CHANGE UP (ref 3.5–5.3)
PROT SERPL-MCNC: 6.2 G/DL — SIGNIFICANT CHANGE UP (ref 6–8.3)
RBC # BLD: 3.02 M/UL — LOW (ref 4.2–5.8)
RBC # FLD: 17.7 % — HIGH (ref 10.3–14.5)
SODIUM SERPL-SCNC: 138 MMOL/L — SIGNIFICANT CHANGE UP (ref 135–145)
WBC # BLD: 7.16 K/UL — SIGNIFICANT CHANGE UP (ref 3.8–10.5)
WBC # FLD AUTO: 7.16 K/UL — SIGNIFICANT CHANGE UP (ref 3.8–10.5)

## 2018-09-13 PROCEDURE — 99232 SBSQ HOSP IP/OBS MODERATE 35: CPT

## 2018-09-13 RX ORDER — SUCRALFATE 1 G
1 TABLET ORAL
Qty: 60 | Refills: 0
Start: 2018-09-13 | End: 2018-10-12

## 2018-09-13 RX ORDER — PANTOPRAZOLE SODIUM 20 MG/1
1 TABLET, DELAYED RELEASE ORAL
Qty: 30 | Refills: 0
Start: 2018-09-13 | End: 2018-10-12

## 2018-09-13 RX ADMIN — Medication 100 MILLIGRAM(S): at 06:01

## 2018-09-13 RX ADMIN — Medication 25 MILLIGRAM(S): at 06:02

## 2018-09-13 RX ADMIN — Medication 1 SPRAY(S): at 06:01

## 2018-09-13 RX ADMIN — Medication 200 MILLIGRAM(S): at 06:01

## 2018-09-13 RX ADMIN — Medication 1 GRAM(S): at 06:01

## 2018-09-13 RX ADMIN — PANTOPRAZOLE SODIUM 40 MILLIGRAM(S): 20 TABLET, DELAYED RELEASE ORAL at 06:01

## 2018-09-13 RX ADMIN — Medication 325 MILLIGRAM(S): at 06:01

## 2018-09-13 NOTE — PROGRESS NOTE ADULT - SUBJECTIVE AND OBJECTIVE BOX
[facility]    Jona Bush, Alix, Ghada, Jian, Jayy, Cindy      [phone]    CHIEF COMPLAINT: Patient is a 67y old  Male who presents with a chief complaint of Anemia (13 Sep 2018 08:44)      Follow Up: anemia, , syncope, pos trop    Interim history: The patient reports no new symptoms.  Denies chest discomfort and shortness of breath.  No abdominal pain.  No new neurologic symptoms.      MEDICATIONS  (STANDING):  docusate sodium 100 milliGRAM(s) Oral two times a day  ferrous    sulfate 325 milliGRAM(s) Oral two times a day  fluticasone propionate 50 MICROgram(s)/spray Nasal Spray 1 Spray(s) Both Nostrils two times a day  influenza   Vaccine 0.5 milliLiter(s) IntraMuscular once  metoprolol tartrate 25 milliGRAM(s) Oral two times a day  mirtazapine 30 milliGRAM(s) Oral at bedtime  pantoprazole    Tablet 40 milliGRAM(s) Oral before breakfast  phenytoin   Capsule 200 milliGRAM(s) Oral two times a day  sertraline 50 milliGRAM(s) Oral daily  simvastatin 20 milliGRAM(s) Oral at bedtime  sucralfate 1 Gram(s) Oral two times a day  tamsulosin 0.4 milliGRAM(s) Oral at bedtime    MEDICATIONS  (PRN):  acetaminophen   Tablet .. 650 milliGRAM(s) Oral every 6 hours PRN Mild Pain (1 - 3)  acetaminophen   Tablet .. 650 milliGRAM(s) Oral once PRN Mild Pain (1 - 3), Moderate Pain (4 - 6)  ondansetron Injectable 4 milliGRAM(s) IV Push every 6 hours PRN Nausea      REVIEW OF SYSTEMS:  eye, ent, GI, , allergic, dermatologic, musculoskeletal and neurologic are negative except as described above    Vital Signs Last 24 Hrs  T(C): 36.9 (13 Sep 2018 07:37), Max: 37.4 (13 Sep 2018 04:38)  T(F): 98.4 (13 Sep 2018 07:37), Max: 99.4 (13 Sep 2018 04:38)  HR: 73 (13 Sep 2018 07:37) (73 - 115)  BP: 126/78 (13 Sep 2018 07:37) (108/58 - 151/79)  BP(mean): --  RR: 15 (13 Sep 2018 07:37) (15 - 20)  SpO2: 97% (13 Sep 2018 07:37) (96% - 99%)    I&O's Summary    12 Sep 2018 07:01  -  13 Sep 2018 07:00  --------------------------------------------------------  IN: 340 mL / OUT: 0 mL / NET: 340 mL    13 Sep 2018 07:01  -  13 Sep 2018 10:21  --------------------------------------------------------  IN: 360 mL / OUT: 0 mL / NET: 360 mL        Telemetry past 24h:    PHYSICAL EXAM:    Constitutional: well-nourished, well-developed, NAD   HEENT:  MMM, sclerae anicteric, conjunctivae clear, no oral cyanosis.  Pulmonary: Non-labored, breath sounds are clear bilaterally, No wheezing, rales or rhonchi  Cardiovascular: Regular, S1 and S2.  No murmur.  No rubs, gallops or clicks  Gastrointestinal: Bowel Sounds present, soft, nontender.   Lymph: No peripheral edema.   Neurological: Alert, no focal deficits  Skin: No rashes.  Psych:  Mood & affect appropriate    LABS: All Labs Reviewed:                        9.2    7.16  )-----------( 446      ( 13 Sep 2018 07:34 )             28.0                         8.8    6.68  )-----------( 405      ( 12 Sep 2018 06:16 )             26.6                         9.3    11.00 )-----------( 453      ( 11 Sep 2018 06:29 )             28.6     13 Sep 2018 07:34    138    |  105    |  17     ----------------------------<  105    4.7     |  27     |  1.00   12 Sep 2018 06:16    141    |  105    |  14     ----------------------------<  95     4.1     |  29     |  1.00   11 Sep 2018 06:29    137    |  103    |  20     ----------------------------<  106    4.7     |  27     |  1.10     Ca    8.8        13 Sep 2018 07:34  Ca    8.4        12 Sep 2018 06:16  Ca    8.6        11 Sep 2018 06:29    TPro  6.2    /  Alb  2.7    /  TBili  0.3    /  DBili  x      /  AST  23     /  ALT  40     /  AlkPhos  111    13 Sep 2018 07:34  TPro  6.6    /  Alb  2.9    /  TBili  0.2    /  DBili  x      /  AST  20     /  ALT  41     /  AlkPhos  104    11 Sep 2018 06:29          Blood Culture:         RADIOLOGY:    EKG:    Echo:

## 2018-09-13 NOTE — PROGRESS NOTE ADULT - SUBJECTIVE AND OBJECTIVE BOX
The patient was interviewed and evaluated  67y Male    Vital Signs Last 24 Hrs  T(C): 36.9 (13 Sep 2018 07:37), Max: 37.4 (13 Sep 2018 04:38)  T(F): 98.4 (13 Sep 2018 07:37), Max: 99.4 (13 Sep 2018 04:38)  HR: 73 (13 Sep 2018 07:37) (73 - 115)  BP: 126/78 (13 Sep 2018 07:37) (108/58 - 151/79)  BP(mean): --  RR: 15 (13 Sep 2018 07:37) (15 - 20)  SpO2: 97% (13 Sep 2018 07:37) (96% - 99%)    Pt seen, doing well, no anesthesia complications or complaints noted or reported.   No Nausea    No additional recommendations.     Pain well controlled

## 2018-09-13 NOTE — PROGRESS NOTE ADULT - ASSESSMENT
67y old M PMH EtOH abuse, anemia, prior hemorrhagic stroke 2014, HTN, GERD, seizure 2014 who presents with a chief complaint of weakness and syncope. Found to be profoundly anemic, with hemoglobin 4.5, troponins slightly elevated.     #1 Elevated Troponins/CAD  - Mildly elevated troponins but other cardiac biomarkers normal, not consistent of ACS.  It is likely due to demand ischemia from profound anemia   - No arrhythmias on tele, now stopped  - Patient has stable CAD, Continue BB and statin.  Okay to hold ASA for now until cleared by GI  - Echo with normal LVEF  - s/p colonoscopy without complication yesterday      #2 Syncope  - This is likely from his profound anemia with Hgb of 4.5 on admission   - S/P  PRBC's x 3 units total. H&H remains stable 9.3/28.6  Transfuse prn to keep >7  - S/P EGD large duodenal ulcer   - Continue PPI/ carafate   - Avoid aggravating factors      no contraindication to dc from a cv perspective

## 2018-09-13 NOTE — DISCHARGE NOTE ADULT - CARE PLAN
Principal Discharge DX:	Anemia due to acute blood loss  Goal:	fu with GI  Assessment and plan of treatment:	fu with GI

## 2018-09-13 NOTE — DISCHARGE NOTE ADULT - PATIENT PORTAL LINK FT
You can access the [facility], offered by [facility], by registering with the following website: [email or web address]

## 2018-09-13 NOTE — DISCHARGE NOTE ADULT - MEDICATION SUMMARY - MEDICATIONS TO TAKE
I will START or STAY ON the medications listed below when I get home from the hospital:    tamsulosin 0.4 mg oral capsule  -- 1 cap(s) by mouth once a day  -- Indication: For bph    phenytoin 200 mg oral capsule, extended release  -- 1 cap(s) by mouth every 12 hours  -- Indication: For Antiarrythmia    mirtazapine 30 mg oral tablet  -- 1 tab(s) by mouth once a day (at bedtime)  -- Indication: For Depression    sertraline 50 mg oral tablet  -- 3 tab(s) by mouth once a day  -- Indication: For Depression    simvastatin 20 mg oral tablet  -- 1 tab(s) by mouth once a day (at bedtime)  -- Indication: For hld    metoprolol tartrate 25 mg oral tablet  -- 1 tab(s) by mouth 2 times a day  -- Indication: For cad    ferrous sulfate  -- 325 milligram(s) by mouth 2 times a day  -- Indication: For Anemia    docusate sodium 100 mg oral capsule  -- 1 cap(s) by mouth 2 times a day  -- Indication: For constipation    sucralfate 1 g oral tablet  -- 1 tab(s) by mouth 2 times a day  -- Indication: For gib    pantoprazole 40 mg oral delayed release tablet  -- 1 tab(s) by mouth once a day (before a meal)  -- Indication: For Anemia    Multiple Vitamins oral tablet  -- 1 tab(s) by mouth once a day  -- Indication: For Anemia I will START or STAY ON the medications listed below when I get home from the hospital:    ferrous sulfate  -- 325 milligram(s) by mouth 2 times a day  -- Indication: For Anemia    docusate sodium 100 mg oral capsule  -- 1 cap(s) by mouth 2 times a day  -- Indication: For constipation    sucralfate 1 g oral tablet  -- 1 tab(s) by mouth 2 times a day  -- Indication: For gib    pantoprazole 40 mg oral delayed release tablet  -- 1 tab(s) by mouth once a day (before a meal)  -- Indication: For Anemia

## 2018-09-13 NOTE — DISCHARGE NOTE ADULT - HOSPITAL COURSE
67y M hx of alcohol abuse (denies drinking), anemia never required blood transfusion, prior hemorrhagic stroke here with generalized weakness, fatigue, exertional syncope. Pt  4-5 days ago walked into grocery store from parking lot and passed out in threshold. States did not hit head. States drank some fluids, declines EMS transport and went about his shopping, When he got home he walked back and forth to house w 4 loads of groceries and afterward "passed out" again on his bed, no head strike. States in last several days has felt generally weak, fatigue, dyspnea w minimal exertion. States he intermittently takes ASA "when he has a heart pain." States he has never had an EGD or colonoscopy.  egd with duodenal ulcer, dc on protonix, outpt GI fu

## 2018-09-13 NOTE — PROGRESS NOTE ADULT - REASON FOR ADMISSION
Anemia

## 2018-09-13 NOTE — DISCHARGE NOTE ADULT - MEDICATION SUMMARY - MEDICATIONS TO STOP TAKING
I will STOP taking the medications listed below when I get home from the hospital:    fluticasone 50 mcg/inh nasal spray  -- 1 spray(s) into nose 2 times a day    aspirin 325 mg oral tablet  -- 1 tab(s) by mouth once a day I will STOP taking the medications listed below when I get home from the hospital:    tamsulosin 0.4 mg oral capsule  -- 1 cap(s) by mouth once a day    phenytoin 200 mg oral capsule, extended release  -- 1 cap(s) by mouth every 12 hours    mirtazapine 30 mg oral tablet  -- 1 tab(s) by mouth once a day (at bedtime)    sertraline 50 mg oral tablet  -- 3 tab(s) by mouth once a day    simvastatin 20 mg oral tablet  -- 1 tab(s) by mouth once a day (at bedtime)    metoprolol tartrate 25 mg oral tablet  -- 1 tab(s) by mouth 2 times a day    azithromycin 250 mg oral tablet  -- 1 tab(s) by mouth 2 times a day    fluticasone 50 mcg/inh nasal spray  -- 1 spray(s) into nose 2 times a day    Multiple Vitamins oral tablet  -- 1 tab(s) by mouth once a day    aspirin 325 mg oral tablet  -- 1 tab(s) by mouth once a day

## 2018-09-13 NOTE — PROGRESS NOTE ADULT - PROVIDER SPECIALTY LIST ADULT
Anesthesia
Anesthesia
Cardiology
Gastroenterology
Heme/Onc
Hospitalist
Infectious Disease
Hospitalist

## 2018-09-13 NOTE — DISCHARGE NOTE ADULT - CARE PROVIDER_API CALL
Valdemar Foster (DO), Gastroenterology; Internal Medicine  [address] [place] [address]  Phone: [phone]  Fax: [phone]

## 2018-09-17 ENCOUNTER — EMERGENCY (EMERGENCY)
Facility: HOSPITAL | Age: 68
LOS: 1 days | Discharge: HOME | End: 2018-09-17
Attending: EMERGENCY MEDICINE
Payer: MEDICARE

## 2018-09-17 VITALS
WEIGHT: 195.11 LBS | TEMPERATURE: 99 F | DIASTOLIC BLOOD PRESSURE: 107 MMHG | SYSTOLIC BLOOD PRESSURE: 159 MMHG | RESPIRATION RATE: 20 BRPM | HEIGHT: 68 IN | HEART RATE: 100 BPM | OXYGEN SATURATION: 99 %

## 2018-09-17 VITALS
OXYGEN SATURATION: 98 % | HEART RATE: 84 BPM | TEMPERATURE: 98 F | DIASTOLIC BLOOD PRESSURE: 89 MMHG | RESPIRATION RATE: 16 BRPM | SYSTOLIC BLOOD PRESSURE: 155 MMHG

## 2018-09-17 DIAGNOSIS — K26.4 CHRONIC OR UNSPECIFIED DUODENAL ULCER WITH HEMORRHAGE: ICD-10-CM

## 2018-09-17 LAB
ALBUMIN SERPL ELPH-MCNC: 3.2 G/DL — LOW (ref 3.3–5)
ALLERGY+IMMUNOLOGY DIAG STUDY NOTE: SIGNIFICANT CHANGE UP
ALP SERPL-CCNC: 101 U/L — SIGNIFICANT CHANGE UP (ref 40–120)
ALT FLD-CCNC: 27 U/L — SIGNIFICANT CHANGE UP (ref 12–78)
ANION GAP SERPL CALC-SCNC: 6 MMOL/L — SIGNIFICANT CHANGE UP (ref 5–17)
APPEARANCE UR: CLEAR — SIGNIFICANT CHANGE UP
APTT BLD: 34.1 SEC — SIGNIFICANT CHANGE UP (ref 27.5–37.4)
AST SERPL-CCNC: 16 U/L — SIGNIFICANT CHANGE UP (ref 15–37)
BASOPHILS # BLD AUTO: 0.03 K/UL — SIGNIFICANT CHANGE UP (ref 0–0.2)
BASOPHILS NFR BLD AUTO: 0.5 % — SIGNIFICANT CHANGE UP (ref 0–2)
BILIRUB SERPL-MCNC: 0.1 MG/DL — LOW (ref 0.2–1.2)
BILIRUB UR-MCNC: NEGATIVE — SIGNIFICANT CHANGE UP
BLD GP AB SCN SERPL QL: ABNORMAL
BUN SERPL-MCNC: 14 MG/DL — SIGNIFICANT CHANGE UP (ref 7–23)
CALCIUM SERPL-MCNC: 8.9 MG/DL — SIGNIFICANT CHANGE UP (ref 8.5–10.1)
CHLORIDE SERPL-SCNC: 110 MMOL/L — HIGH (ref 96–108)
CO2 SERPL-SCNC: 25 MMOL/L — SIGNIFICANT CHANGE UP (ref 22–31)
COLOR SPEC: SIGNIFICANT CHANGE UP
CREAT SERPL-MCNC: 1 MG/DL — SIGNIFICANT CHANGE UP (ref 0.5–1.3)
DIFF PNL FLD: NEGATIVE — SIGNIFICANT CHANGE UP
DIR ANTIGLOB POLYSPECIFIC INTERPRETATION: SIGNIFICANT CHANGE UP
EOSINOPHIL # BLD AUTO: 0.18 K/UL — SIGNIFICANT CHANGE UP (ref 0–0.5)
EOSINOPHIL NFR BLD AUTO: 3.1 % — SIGNIFICANT CHANGE UP (ref 0–6)
GLUCOSE SERPL-MCNC: 106 MG/DL — HIGH (ref 70–99)
GLUCOSE UR QL: NEGATIVE — SIGNIFICANT CHANGE UP
HCT VFR BLD CALC: 31.6 % — LOW (ref 39–50)
HGB BLD-MCNC: 10.1 G/DL — LOW (ref 13–17)
IMM GRANULOCYTES NFR BLD AUTO: 0.3 % — SIGNIFICANT CHANGE UP (ref 0–1.5)
INR BLD: 1.06 RATIO — SIGNIFICANT CHANGE UP (ref 0.88–1.16)
KETONES UR-MCNC: NEGATIVE — SIGNIFICANT CHANGE UP
LEUKOCYTE ESTERASE UR-ACNC: NEGATIVE — SIGNIFICANT CHANGE UP
LYMPHOCYTES # BLD AUTO: 1.04 K/UL — SIGNIFICANT CHANGE UP (ref 1–3.3)
LYMPHOCYTES # BLD AUTO: 17.9 % — SIGNIFICANT CHANGE UP (ref 13–44)
MCHC RBC-ENTMCNC: 30.2 PG — SIGNIFICANT CHANGE UP (ref 27–34)
MCHC RBC-ENTMCNC: 32 GM/DL — SIGNIFICANT CHANGE UP (ref 32–36)
MCV RBC AUTO: 94.6 FL — SIGNIFICANT CHANGE UP (ref 80–100)
MONOCYTES # BLD AUTO: 0.52 K/UL — SIGNIFICANT CHANGE UP (ref 0–0.9)
MONOCYTES NFR BLD AUTO: 9 % — SIGNIFICANT CHANGE UP (ref 2–14)
NEUTROPHILS # BLD AUTO: 4.02 K/UL — SIGNIFICANT CHANGE UP (ref 1.8–7.4)
NEUTROPHILS NFR BLD AUTO: 69.2 % — SIGNIFICANT CHANGE UP (ref 43–77)
NITRITE UR-MCNC: NEGATIVE — SIGNIFICANT CHANGE UP
NRBC # BLD: 0 /100 WBCS — SIGNIFICANT CHANGE UP (ref 0–0)
OB PNL STL: NEGATIVE — SIGNIFICANT CHANGE UP
PH UR: 5 — SIGNIFICANT CHANGE UP (ref 5–8)
PLATELET # BLD AUTO: 403 K/UL — HIGH (ref 150–400)
POTASSIUM SERPL-MCNC: 4.7 MMOL/L — SIGNIFICANT CHANGE UP (ref 3.5–5.3)
POTASSIUM SERPL-SCNC: 4.7 MMOL/L — SIGNIFICANT CHANGE UP (ref 3.5–5.3)
PROT SERPL-MCNC: 6.4 G/DL — SIGNIFICANT CHANGE UP (ref 6–8.3)
PROT UR-MCNC: NEGATIVE — SIGNIFICANT CHANGE UP
PROTHROM AB SERPL-ACNC: 11.6 SEC — SIGNIFICANT CHANGE UP (ref 9.8–12.7)
RBC # BLD: 3.34 M/UL — LOW (ref 4.2–5.8)
RBC # FLD: 17 % — HIGH (ref 10.3–14.5)
SODIUM SERPL-SCNC: 141 MMOL/L — SIGNIFICANT CHANGE UP (ref 135–145)
SP GR SPEC: 1 — LOW (ref 1.01–1.02)
UROBILINOGEN FLD QL: NEGATIVE — SIGNIFICANT CHANGE UP
WBC # BLD: 5.81 K/UL — SIGNIFICANT CHANGE UP (ref 3.8–10.5)
WBC # FLD AUTO: 5.81 K/UL — SIGNIFICANT CHANGE UP (ref 3.8–10.5)

## 2018-09-17 PROCEDURE — 93010 ELECTROCARDIOGRAM REPORT: CPT

## 2018-09-17 PROCEDURE — 99284 EMERGENCY DEPT VISIT MOD MDM: CPT

## 2018-09-17 PROCEDURE — 71045 X-RAY EXAM CHEST 1 VIEW: CPT | Mod: 26

## 2018-09-17 PROCEDURE — 86077 PHYS BLOOD BANK SERV XMATCH: CPT

## 2018-09-17 RX ORDER — SODIUM CHLORIDE 9 MG/ML
3 INJECTION INTRAMUSCULAR; INTRAVENOUS; SUBCUTANEOUS ONCE
Qty: 0 | Refills: 0 | Status: COMPLETED | OUTPATIENT
Start: 2018-09-17 | End: 2018-09-17

## 2018-09-17 RX ORDER — PANTOPRAZOLE SODIUM 20 MG/1
40 TABLET, DELAYED RELEASE ORAL ONCE
Qty: 0 | Refills: 0 | Status: COMPLETED | OUTPATIENT
Start: 2018-09-17 | End: 2018-09-17

## 2018-09-17 RX ORDER — SODIUM CHLORIDE 9 MG/ML
1000 INJECTION INTRAMUSCULAR; INTRAVENOUS; SUBCUTANEOUS
Qty: 0 | Refills: 0 | Status: DISCONTINUED | OUTPATIENT
Start: 2018-09-17 | End: 2018-09-21

## 2018-09-17 RX ADMIN — SODIUM CHLORIDE 3 MILLILITER(S): 9 INJECTION INTRAMUSCULAR; INTRAVENOUS; SUBCUTANEOUS at 13:30

## 2018-09-17 RX ADMIN — PANTOPRAZOLE SODIUM 40 MILLIGRAM(S): 20 TABLET, DELAYED RELEASE ORAL at 14:04

## 2018-09-17 RX ADMIN — SODIUM CHLORIDE 125 MILLILITER(S): 9 INJECTION INTRAMUSCULAR; INTRAVENOUS; SUBCUTANEOUS at 14:04

## 2018-09-17 NOTE — ED PROVIDER NOTE - OBJECTIVE STATEMENT
black diarrhea since last night.  no pain. pmd- R Immatteo.  pt was admitted here last week, dx bleeding duodenal ucler.  no other complaint.

## 2018-09-17 NOTE — ED PROVIDER NOTE - PROGRESS NOTE DETAILS
All results were explained to patient and/or family and a copy of all available results given.  case dw SUZY horn, deny moreno, olga home.

## 2018-09-17 NOTE — ED ADULT NURSE NOTE - OBJECTIVE STATEMENT
Received the patient in the Er. Patient is alert and oriented. Skin warm and dry. C/O Black stool since yesterday. Patient was admitted last week for GI Bleed. Abdomen soft and non tender

## 2018-09-17 NOTE — CONSULT NOTE ADULT - SUBJECTIVE AND OBJECTIVE BOX
Chief Complaint:  Patient is a 67y old  Male who presents with a chief complaint of melena since last night.    He had recent EGD which showed large DU.    Alcohol abuse  HTN (hypertension)  Hyponatremia  FTT (failure to thrive) in adult  Seizure  Anemia  Retention of urine  UTI (lower urinary tract infection)  Depression  CVA (cerebral infarction)  Backache  Retinal arterial branch occlusion, right  No significant past surgical history  Enlarged tonsils  No significant past surgical history     HPI:      No Known Allergies      sodium chloride 0.9%. 1000 milliLiter(s) IV Continuous <Continuous>        FAMILY HISTORY:  Family history unknown        Review of Systems:    General:  No wt loss, fevers, chills, night sweats,fatigue,   Eyes:  Good vision, no reported pain  ENT:  No sore throat, pain, runny nose, dysphagia  CV:  No pain, palpitatioins, hypo/hypertension  Resp:  No dyspnea, cough, tachypnea, wheezing  :  No pain, bleeding, incontinence, nocturia  Muscle:  No pain, weakness  Neuro:  No weakness, tingling, memory problems  Psych:  No fatigue, insomnia, mood problems, depression  Endocrine:  No polyuria, polydypsia, cold/heat intolerance  Heme:  No petechiae, ecchymosis, easy bruisability  Skin:  No rash, tattoos, scars, edema    Relevant Family History:       Relevant Social History:       Physical Exam:    Vital Signs:  Vital Signs Last 24 Hrs  T(C): 37 (17 Sep 2018 13:02), Max: 37 (17 Sep 2018 13:02)  T(F): 98.6 (17 Sep 2018 13:02), Max: 98.6 (17 Sep 2018 13:02)  HR: 100 (17 Sep 2018 13:02) (100 - 100)  BP: 159/107 (17 Sep 2018 13:02) (159/107 - 159/107)  BP(mean): --  RR: 20 (17 Sep 2018 13:02) (20 - 20)  SpO2: 99% (17 Sep 2018 13:02) (99% - 99%)  Daily Height in cm: 172.72 (17 Sep 2018 13:02)    Daily     General:  Appears stated age, well-groomed, well-nourished, no distress  HEENT:  NC/AT,  conjunctivae clear and pink, no thyromegaly, nodules, adenopathy, no JVD  Chest:  Full & symmetric excursion, no increased effort, breath sounds clear  Cardiovascular:  Regular rhythm, S1, S2, no murmur/rub/S3/S4, no abdominal bruit, no edema  Abdomen:  Soft, non-tender, non-distended, normoactive bowel sounds,  no masses ,no hepatosplenomeagaly, no signs of chronic liver disease  Extremities:  no cyanosis,clubbing or edema  Skin:  No rash/erythema/ecchymoses/petechiae/wounds/abscess/warm/dry  Neuro/Psych:  Alert, oriented, no asterixis, no tremor, no encephalopathy    Laboratory:                            10.1   5.81  )-----------( 403      ( 17 Sep 2018 13:32 )             31.6     09-17    141  |  110<H>  |  14  ----------------------------<  106<H>  4.7   |  25  |  1.00    Ca    8.9      17 Sep 2018 13:32    TPro  6.4  /  Alb  3.2<L>  /  TBili  0.1<L>  /  DBili  x   /  AST  16  /  ALT  27  /  AlkPhos  101  09-17    LIVER FUNCTIONS - ( 17 Sep 2018 13:32 )  Alb: 3.2 g/dL / Pro: 6.4 g/dL / ALK PHOS: 101 U/L / ALT: 27 U/L / AST: 16 U/L / GGT: x           PT/INR - ( 17 Sep 2018 13:32 )   PT: 11.6 sec;   INR: 1.06 ratio         PTT - ( 17 Sep 2018 13:32 )  PTT:34.1 sec      Imaging:

## 2018-09-17 NOTE — ED ADULT NURSE NOTE - NSIMPLEMENTINTERV_GEN_ALL_ED
Implemented All Fall with Harm Risk Interventions:  [place] to call system. Call bell, personal items and telephone within reach. Instruct patient to call for assistance. Room bathroom lighting operational. Non-slip footwear when patient is off stretcher. Physically safe environment: no spills, clutter or unnecessary equipment. Stretcher in lowest position, wheels locked, appropriate side rails in place. Provide visual cue, wrist band, yellow gown, etc. Monitor gait and stability. Monitor for mental status changes and reorient to person, place, and time. Review medications for side effects contributing to fall risk. Reinforce activity limits and safety measures with patient and family. Provide visual clues: red socks.

## 2018-09-18 PROCEDURE — 85027 COMPLETE CBC AUTOMATED: CPT

## 2018-09-18 PROCEDURE — 86905 BLOOD TYPING RBC ANTIGENS: CPT

## 2018-09-18 PROCEDURE — 86901 BLOOD TYPING SEROLOGIC RH(D): CPT

## 2018-09-18 PROCEDURE — 71045 X-RAY EXAM CHEST 1 VIEW: CPT

## 2018-09-18 PROCEDURE — 86880 COOMBS TEST DIRECT: CPT

## 2018-09-18 PROCEDURE — 85610 PROTHROMBIN TIME: CPT

## 2018-09-18 PROCEDURE — 86870 RBC ANTIBODY IDENTIFICATION: CPT

## 2018-09-18 PROCEDURE — 81003 URINALYSIS AUTO W/O SCOPE: CPT

## 2018-09-18 PROCEDURE — 80053 COMPREHEN METABOLIC PANEL: CPT

## 2018-09-18 PROCEDURE — 82272 OCCULT BLD FECES 1-3 TESTS: CPT

## 2018-09-18 PROCEDURE — 93005 ELECTROCARDIOGRAM TRACING: CPT

## 2018-09-18 PROCEDURE — 86850 RBC ANTIBODY SCREEN: CPT

## 2018-09-18 PROCEDURE — 85730 THROMBOPLASTIN TIME PARTIAL: CPT

## 2018-09-18 PROCEDURE — 96374 THER/PROPH/DIAG INJ IV PUSH: CPT

## 2018-09-18 PROCEDURE — 86900 BLOOD TYPING SEROLOGIC ABO: CPT

## 2018-09-18 PROCEDURE — 99284 EMERGENCY DEPT VISIT MOD MDM: CPT | Mod: 25

## 2018-10-25 PROCEDURE — 80048 BASIC METABOLIC PNL TOTAL CA: CPT

## 2018-10-25 PROCEDURE — 83615 LACTATE (LD) (LDH) ENZYME: CPT

## 2018-10-25 PROCEDURE — 70450 CT HEAD/BRAIN W/O DYE: CPT

## 2018-10-25 PROCEDURE — 82607 VITAMIN B-12: CPT

## 2018-10-25 PROCEDURE — 87040 BLOOD CULTURE FOR BACTERIA: CPT

## 2018-10-25 PROCEDURE — 81003 URINALYSIS AUTO W/O SCOPE: CPT

## 2018-10-25 PROCEDURE — 86901 BLOOD TYPING SEROLOGIC RH(D): CPT

## 2018-10-25 PROCEDURE — 82728 ASSAY OF FERRITIN: CPT

## 2018-10-25 PROCEDURE — 85027 COMPLETE CBC AUTOMATED: CPT

## 2018-10-25 PROCEDURE — 82272 OCCULT BLD FECES 1-3 TESTS: CPT

## 2018-10-25 PROCEDURE — 93306 TTE W/DOPPLER COMPLETE: CPT

## 2018-10-25 PROCEDURE — 94640 AIRWAY INHALATION TREATMENT: CPT

## 2018-10-25 PROCEDURE — 86900 BLOOD TYPING SEROLOGIC ABO: CPT

## 2018-10-25 PROCEDURE — 82746 ASSAY OF FOLIC ACID SERUM: CPT

## 2018-10-25 PROCEDURE — 85730 THROMBOPLASTIN TIME PARTIAL: CPT

## 2018-10-25 PROCEDURE — 74176 CT ABD & PELVIS W/O CONTRAST: CPT

## 2018-10-25 PROCEDURE — P9016: CPT

## 2018-10-25 PROCEDURE — 36430 TRANSFUSION BLD/BLD COMPNT: CPT

## 2018-10-25 PROCEDURE — 93005 ELECTROCARDIOGRAM TRACING: CPT

## 2018-10-25 PROCEDURE — 86850 RBC ANTIBODY SCREEN: CPT

## 2018-10-25 PROCEDURE — 88313 SPECIAL STAINS GROUP 2: CPT

## 2018-10-25 PROCEDURE — 86923 COMPATIBILITY TEST ELECTRIC: CPT

## 2018-10-25 PROCEDURE — 84484 ASSAY OF TROPONIN QUANT: CPT

## 2018-10-25 PROCEDURE — 83550 IRON BINDING TEST: CPT

## 2018-10-25 PROCEDURE — 88312 SPECIAL STAINS GROUP 1: CPT

## 2018-10-25 PROCEDURE — 96366 THER/PROPH/DIAG IV INF ADDON: CPT

## 2018-10-25 PROCEDURE — 80053 COMPREHEN METABOLIC PANEL: CPT

## 2018-10-25 PROCEDURE — 99285 EMERGENCY DEPT VISIT HI MDM: CPT | Mod: 25

## 2018-10-25 PROCEDURE — 85610 PROTHROMBIN TIME: CPT

## 2018-10-25 PROCEDURE — 71045 X-RAY EXAM CHEST 1 VIEW: CPT

## 2018-10-25 PROCEDURE — 82553 CREATINE MB FRACTION: CPT

## 2018-10-25 PROCEDURE — 96367 TX/PROPH/DG ADDL SEQ IV INF: CPT

## 2018-10-25 PROCEDURE — 82550 ASSAY OF CK (CPK): CPT

## 2018-10-25 PROCEDURE — 83605 ASSAY OF LACTIC ACID: CPT

## 2018-10-25 PROCEDURE — 97161 PT EVAL LOW COMPLEX 20 MIN: CPT

## 2018-10-25 PROCEDURE — 85045 AUTOMATED RETICULOCYTE COUNT: CPT

## 2018-10-25 PROCEDURE — 88305 TISSUE EXAM BY PATHOLOGIST: CPT

## 2018-10-25 PROCEDURE — 96365 THER/PROPH/DIAG IV INF INIT: CPT | Mod: XU

## 2018-10-25 PROCEDURE — 87086 URINE CULTURE/COLONY COUNT: CPT

## 2018-11-01 ENCOUNTER — NON-APPOINTMENT (OUTPATIENT)
Age: 68
End: 2018-11-01

## 2018-11-01 ENCOUNTER — APPOINTMENT (OUTPATIENT)
Dept: CARDIOLOGY | Facility: CLINIC | Age: 68
End: 2018-11-01
Payer: MEDICARE

## 2018-11-01 VITALS
HEIGHT: 68 IN | BODY MASS INDEX: 30.31 KG/M2 | HEART RATE: 110 BPM | WEIGHT: 200 LBS | OXYGEN SATURATION: 98 % | SYSTOLIC BLOOD PRESSURE: 146 MMHG | DIASTOLIC BLOOD PRESSURE: 90 MMHG

## 2018-11-01 DIAGNOSIS — D64.9 ANEMIA, UNSPECIFIED: ICD-10-CM

## 2018-11-01 DIAGNOSIS — K26.4 CHRONIC OR UNSPECIFIED DUODENAL ULCER WITH HEMORRHAGE: ICD-10-CM

## 2018-11-01 DIAGNOSIS — Z78.9 OTHER SPECIFIED HEALTH STATUS: ICD-10-CM

## 2018-11-01 PROCEDURE — 93000 ELECTROCARDIOGRAM COMPLETE: CPT

## 2018-11-01 PROCEDURE — 99215 OFFICE O/P EST HI 40 MIN: CPT

## 2018-11-01 RX ORDER — PANTOPRAZOLE SODIUM 40 MG/1
40 TABLET, DELAYED RELEASE ORAL
Refills: 0 | Status: ACTIVE | COMMUNITY

## 2018-11-01 RX ORDER — ASPIRIN 500 MG
500 TABLET ORAL
Refills: 0 | Status: ACTIVE | COMMUNITY

## 2018-11-01 RX ORDER — FERROUS SULFATE 325(65) MG
325 (65 FE) TABLET ORAL
Refills: 0 | Status: ACTIVE | COMMUNITY

## 2018-11-01 RX ORDER — SUCRALFATE 1 G/1
1 TABLET ORAL
Qty: 60 | Refills: 5 | Status: ACTIVE | COMMUNITY

## 2018-12-14 ENCOUNTER — APPOINTMENT (OUTPATIENT)
Dept: CARDIOLOGY | Facility: CLINIC | Age: 68
End: 2018-12-14
Payer: MEDICARE

## 2018-12-14 VITALS
BODY MASS INDEX: 30.01 KG/M2 | OXYGEN SATURATION: 97 % | WEIGHT: 198 LBS | SYSTOLIC BLOOD PRESSURE: 123 MMHG | HEART RATE: 93 BPM | HEIGHT: 68 IN | DIASTOLIC BLOOD PRESSURE: 80 MMHG

## 2018-12-14 VITALS — SYSTOLIC BLOOD PRESSURE: 130 MMHG | HEART RATE: 90 BPM | DIASTOLIC BLOOD PRESSURE: 84 MMHG

## 2018-12-14 DIAGNOSIS — I63.421 CEREBRAL INFARCTION DUE TO EMBOLISM OF RIGHT ANTERIOR CEREBRAL ARTERY: ICD-10-CM

## 2018-12-14 DIAGNOSIS — R74.8 ABNORMAL LEVELS OF OTHER SERUM ENZYMES: ICD-10-CM

## 2018-12-14 DIAGNOSIS — E78.5 HYPERLIPIDEMIA, UNSPECIFIED: ICD-10-CM

## 2018-12-14 DIAGNOSIS — I10 ESSENTIAL (PRIMARY) HYPERTENSION: ICD-10-CM

## 2018-12-14 PROCEDURE — 99214 OFFICE O/P EST MOD 30 MIN: CPT

## 2018-12-14 NOTE — HISTORY OF PRESENT ILLNESS
[FreeTextEntry1] : I saw Abelardo Gill in the office today for cardiac followup. He is a 67-year-old white male who was recently hospitalized at [facility] with severe anemia due to bleeding duodenal ulcer. In the hospital he had an elevated troponin of 1.02 which was felt to be due to the anemia. He presented with a hemoglobin of 4.5. He was treated with blood transfusions and discharged on Iron, Carafate, and Protonix. He had an echo that showed an ejection fraction of 65% was stage I diastolic dysfunction.\par \par The patient had been taking aspirin, metoprolol, simvastatin, remeron, tamsulosin, and sertraline.\par \par His past medical history is significant for hyperlipidemia and hypertension. He had a central retinal vein occlusion and had been on aspirin. He has no known history of heart disease.\par \par The patient is doing well. Is having no symptoms of chest pain, shortness of breath, lightheadedness or palpitation periods back to full activities\par \par According to the patient blood work in your office is back to normal including a CBC. He has no symptoms but does have sciatica and sometimes has difficulty walking. Blood pressure seems under fairly good control on the date of blocker. Heart is compound from 110-90 beats per minute.\par \par The patient is taking Movigo body and back which contains 500 mg of aspirin\par \par

## 2018-12-14 NOTE — DISCUSSION/SUMMARY
[FreeTextEntry1] : The patient will schedule a stress test. He thinks he can walk on the treadmill so we'll start with a treadmill test. Since his heart rate is elevated despite the beta blocker I will continue the beta blocker for the stress test hopefully to keep his blood pressure under control. I suspect that the heart rate will not be a problem. Assuming the stress test is normal I would not pursue any other further cardiac diagnostic testing unless he has symptoms.\par \par If all is well I would see the patient again in about 3 months. I would appreciate your sending copy of the blood work for my review.

## 2018-12-14 NOTE — PHYSICAL EXAM
[General Appearance - Well Developed] : well developed [Normal Appearance] : normal appearance [Well Groomed] : well groomed [General Appearance - Well Nourished] : well nourished [No Deformities] : no deformities [General Appearance - In No Acute Distress] : no acute distress [Normal Conjunctiva] : the conjunctiva exhibited no abnormalities [Normal Oral Mucosa] : normal oral mucosa [Normal Jugular Venous A Waves Present] : normal jugular venous A waves present [Normal Jugular Venous V Waves Present] : normal jugular venous V waves present [No Jugular Venous Delaney A Waves] : no jugular venous delaney A waves [Bowel Sounds] : normal bowel sounds [Abdomen Soft] : soft [Abdomen Tenderness] : non-tender [Abnormal Walk] : normal gait [Nail Clubbing] : no clubbing of the fingernails [Cyanosis, Localized] : no localized cyanosis [Skin Color & Pigmentation] : normal skin color and pigmentation [Skin Turgor] : normal skin turgor [Oriented To Time, Place, And Person] : oriented to person, place, and time [Impaired Insight] : insight and judgment were intact [No Anxiety] : not feeling anxious [] : no respiratory distress [Respiration, Rhythm And Depth] : normal respiratory rhythm and effort [Exaggerated Use Of Accessory Muscles For Inspiration] : no accessory muscle use [Auscultation Breath Sounds / Voice Sounds] : lungs were clear to auscultation bilaterally [Normal Rate] : normal [Normal S1] : normal S1 [Normal S2] : normal S2 [No Murmur] : no murmurs heard [2+] : left 2+ [No Abnormalities] : the abdominal aorta was not enlarged and no bruit was heard [No Pitting Edema] : no pitting edema present [S3] : no S3 [S4] : no S4 [Right Carotid Bruit] : no bruit heard over the right carotid [Left Carotid Bruit] : no bruit heard over the left carotid [Right Femoral Bruit] : no bruit heard over the right femoral artery [Left Femoral Bruit] : no bruit heard over the left femoral artery

## 2018-12-14 NOTE — REASON FOR VISIT
[Follow-Up - Clinic] : a clinic follow-up of [Hyperlipidemia] : hyperlipidemia [Hypertension] : hypertension [FreeTextEntry1] : Central Retinal Vein Occlusion, Elevated troponin, Duodenal Ulcer

## 2018-12-17 ENCOUNTER — APPOINTMENT (OUTPATIENT)
Dept: CARDIOLOGY | Facility: CLINIC | Age: 68
End: 2018-12-17
Payer: MEDICARE

## 2018-12-17 PROCEDURE — 93015 CV STRESS TEST SUPVJ I&R: CPT

## 2019-01-21 ENCOUNTER — APPOINTMENT (OUTPATIENT)
Dept: CARDIOLOGY | Facility: CLINIC | Age: 69
End: 2019-01-21

## 2019-02-13 ENCOUNTER — APPOINTMENT (OUTPATIENT)
Dept: CARDIOLOGY | Facility: CLINIC | Age: 69
End: 2019-02-13

## 2019-02-26 ENCOUNTER — APPOINTMENT (OUTPATIENT)
Dept: CARDIOLOGY | Facility: CLINIC | Age: 69
End: 2019-02-26
Payer: MEDICARE

## 2019-02-26 PROCEDURE — 78452 HT MUSCLE IMAGE SPECT MULT: CPT

## 2019-02-26 PROCEDURE — 93015 CV STRESS TEST SUPVJ I&R: CPT

## 2019-02-26 PROCEDURE — A9500: CPT

## 2019-05-22 ENCOUNTER — MEDICATION RENEWAL (OUTPATIENT)
Age: 69
End: 2019-05-22

## 2019-09-16 ENCOUNTER — RX RENEWAL (OUTPATIENT)
Age: 69
End: 2019-09-16

## 2020-02-13 ENCOUNTER — RX RENEWAL (OUTPATIENT)
Age: 70
End: 2020-02-13

## 2021-04-08 ENCOUNTER — EMERGENCY (EMERGENCY)
Facility: HOSPITAL | Age: 71
LOS: 1 days | Discharge: HOME | End: 2021-04-08
Attending: STUDENT IN AN ORGANIZED HEALTH CARE EDUCATION/TRAINING PROGRAM | Admitting: STUDENT IN AN ORGANIZED HEALTH CARE EDUCATION/TRAINING PROGRAM
Payer: MEDICARE

## 2021-04-08 VITALS
DIASTOLIC BLOOD PRESSURE: 66 MMHG | RESPIRATION RATE: 18 BRPM | HEIGHT: 68 IN | SYSTOLIC BLOOD PRESSURE: 122 MMHG | TEMPERATURE: 97 F | HEART RATE: 81 BPM | WEIGHT: 188.05 LBS | OXYGEN SATURATION: 98 %

## 2021-04-08 LAB
ALBUMIN SERPL ELPH-MCNC: 3.8 G/DL — SIGNIFICANT CHANGE UP (ref 3.3–5)
ALP SERPL-CCNC: 66 U/L — SIGNIFICANT CHANGE UP (ref 40–120)
ALT FLD-CCNC: 18 U/L — SIGNIFICANT CHANGE UP (ref 12–78)
ANION GAP SERPL CALC-SCNC: 4 MMOL/L — LOW (ref 5–17)
APTT BLD: 34.7 SEC — SIGNIFICANT CHANGE UP (ref 27.5–35.5)
AST SERPL-CCNC: 15 U/L — SIGNIFICANT CHANGE UP (ref 15–37)
BASOPHILS # BLD AUTO: 0.01 K/UL — SIGNIFICANT CHANGE UP (ref 0–0.2)
BASOPHILS NFR BLD AUTO: 0.2 % — SIGNIFICANT CHANGE UP (ref 0–2)
BILIRUB SERPL-MCNC: 0.4 MG/DL — SIGNIFICANT CHANGE UP (ref 0.2–1.2)
BUN SERPL-MCNC: 11 MG/DL — SIGNIFICANT CHANGE UP (ref 7–23)
CALCIUM SERPL-MCNC: 9.5 MG/DL — SIGNIFICANT CHANGE UP (ref 8.5–10.1)
CHLORIDE SERPL-SCNC: 107 MMOL/L — SIGNIFICANT CHANGE UP (ref 96–108)
CO2 SERPL-SCNC: 28 MMOL/L — SIGNIFICANT CHANGE UP (ref 22–31)
CREAT SERPL-MCNC: 1.1 MG/DL — SIGNIFICANT CHANGE UP (ref 0.5–1.3)
D DIMER BLD IA.RAPID-MCNC: 170 NG/ML DDU — SIGNIFICANT CHANGE UP
EOSINOPHIL # BLD AUTO: 0.12 K/UL — SIGNIFICANT CHANGE UP (ref 0–0.5)
EOSINOPHIL NFR BLD AUTO: 2.5 % — SIGNIFICANT CHANGE UP (ref 0–6)
GLUCOSE SERPL-MCNC: 115 MG/DL — HIGH (ref 70–99)
HCT VFR BLD CALC: 43.4 % — SIGNIFICANT CHANGE UP (ref 39–50)
HGB BLD-MCNC: 14.4 G/DL — SIGNIFICANT CHANGE UP (ref 13–17)
IMM GRANULOCYTES NFR BLD AUTO: 0.2 % — SIGNIFICANT CHANGE UP (ref 0–1.5)
INR BLD: 1.07 RATIO — SIGNIFICANT CHANGE UP (ref 0.88–1.16)
LYMPHOCYTES # BLD AUTO: 1.08 K/UL — SIGNIFICANT CHANGE UP (ref 1–3.3)
LYMPHOCYTES # BLD AUTO: 22.1 % — SIGNIFICANT CHANGE UP (ref 13–44)
MAGNESIUM SERPL-MCNC: 2.2 MG/DL — SIGNIFICANT CHANGE UP (ref 1.6–2.6)
MCHC RBC-ENTMCNC: 30.1 PG — SIGNIFICANT CHANGE UP (ref 27–34)
MCHC RBC-ENTMCNC: 33.2 GM/DL — SIGNIFICANT CHANGE UP (ref 32–36)
MCV RBC AUTO: 90.6 FL — SIGNIFICANT CHANGE UP (ref 80–100)
MONOCYTES # BLD AUTO: 0.4 K/UL — SIGNIFICANT CHANGE UP (ref 0–0.9)
MONOCYTES NFR BLD AUTO: 8.2 % — SIGNIFICANT CHANGE UP (ref 2–14)
NEUTROPHILS # BLD AUTO: 3.26 K/UL — SIGNIFICANT CHANGE UP (ref 1.8–7.4)
NEUTROPHILS NFR BLD AUTO: 66.8 % — SIGNIFICANT CHANGE UP (ref 43–77)
NRBC # BLD: 0 /100 WBCS — SIGNIFICANT CHANGE UP (ref 0–0)
PLATELET # BLD AUTO: 232 K/UL — SIGNIFICANT CHANGE UP (ref 150–400)
POTASSIUM SERPL-MCNC: 5 MMOL/L — SIGNIFICANT CHANGE UP (ref 3.5–5.3)
POTASSIUM SERPL-SCNC: 5 MMOL/L — SIGNIFICANT CHANGE UP (ref 3.5–5.3)
PROT SERPL-MCNC: 7.2 G/DL — SIGNIFICANT CHANGE UP (ref 6–8.3)
PROTHROM AB SERPL-ACNC: 12.5 SEC — SIGNIFICANT CHANGE UP (ref 10.6–13.6)
RBC # BLD: 4.79 M/UL — SIGNIFICANT CHANGE UP (ref 4.2–5.8)
RBC # FLD: 11.9 % — SIGNIFICANT CHANGE UP (ref 10.3–14.5)
SODIUM SERPL-SCNC: 139 MMOL/L — SIGNIFICANT CHANGE UP (ref 135–145)
TROPONIN I SERPL-MCNC: <.015 NG/ML — SIGNIFICANT CHANGE UP (ref 0.01–0.04)
WBC # BLD: 4.88 K/UL — SIGNIFICANT CHANGE UP (ref 3.8–10.5)
WBC # FLD AUTO: 4.88 K/UL — SIGNIFICANT CHANGE UP (ref 3.8–10.5)

## 2021-04-08 PROCEDURE — 99285 EMERGENCY DEPT VISIT HI MDM: CPT

## 2021-04-08 PROCEDURE — 99284 EMERGENCY DEPT VISIT MOD MDM: CPT | Mod: 25

## 2021-04-08 PROCEDURE — 85379 FIBRIN DEGRADATION QUANT: CPT

## 2021-04-08 PROCEDURE — 85730 THROMBOPLASTIN TIME PARTIAL: CPT

## 2021-04-08 PROCEDURE — 83735 ASSAY OF MAGNESIUM: CPT

## 2021-04-08 PROCEDURE — 85610 PROTHROMBIN TIME: CPT

## 2021-04-08 PROCEDURE — 99284 EMERGENCY DEPT VISIT MOD MDM: CPT

## 2021-04-08 PROCEDURE — 71046 X-RAY EXAM CHEST 2 VIEWS: CPT | Mod: 26

## 2021-04-08 PROCEDURE — 80053 COMPREHEN METABOLIC PANEL: CPT

## 2021-04-08 PROCEDURE — 36415 COLL VENOUS BLD VENIPUNCTURE: CPT

## 2021-04-08 PROCEDURE — 84484 ASSAY OF TROPONIN QUANT: CPT

## 2021-04-08 PROCEDURE — 93010 ELECTROCARDIOGRAM REPORT: CPT

## 2021-04-08 PROCEDURE — 93005 ELECTROCARDIOGRAM TRACING: CPT

## 2021-04-08 PROCEDURE — 71046 X-RAY EXAM CHEST 2 VIEWS: CPT

## 2021-04-08 PROCEDURE — 85025 COMPLETE CBC W/AUTO DIFF WBC: CPT

## 2021-04-08 NOTE — ED PROVIDER NOTE - NSFOLLOWUPINSTRUCTIONS_ED_ALL_ED_FT
Please follow up with your PMD. If any symptoms develop please seek care.  PLEASE FOLLOW UP WITH DR. CACERES.

## 2021-04-08 NOTE — ED PROVIDER NOTE - PATIENT PORTAL LINK FT
You can access the FollowMyHealth Patient Portal offered by [facility] [place] by registering at the following website: [email or web address]. By joining [employer]’s FollowMyHealth portal, you will also be able to view your health information using other applications (apps) compatible with our system.

## 2021-04-08 NOTE — ED PROVIDER NOTE - PROGRESS NOTE DETAILS
PMD office contacted, fax of outpatient EKG requested. patient seen and cleared by cardio. Will discharge.

## 2021-04-08 NOTE — ED PROVIDER NOTE - CLINICAL SUMMARY MEDICAL DECISION MAKING FREE TEXT BOX
sent by [facility] for evaluation, report of abdnormal EKG- EKG here normal. check labs, obtain outpatient EKG/cardio consult.

## 2021-04-08 NOTE — ED ADULT NURSE NOTE - OBJECTIVE STATEMENT
Pt sent by MD Aquino for eval of abnormal EKG. Per patient was at office for routine workup, denies symptoms at this time. Per patient has noted chest discomfort under stress x years.

## 2021-04-08 NOTE — ED PROVIDER NOTE - OBJECTIVE STATEMENT
70 M history of HTN, CVA, dementia sent in by Dr. Bush for evaluation. per patient he has been in [place] for the past 3 weeks returned a few days ago. Went to PMD Dr. Rahman for check up, had EKG done and was told to go to [place] ER immediately. patient declined, went home and relaxed instead. This morning called the office for results of "cardiac enzyme" which was not back. Again told to go to the ER. He called Dr. Bush who told him to go to the ER. patient denies symptoms now or yesterday. No dizziness, chest pain, shortness of breath, nausea, vomiting, lower extremity swelling. patient took 2 asa this morning.

## 2021-04-08 NOTE — CONSULT NOTE ADULT - ASSESSMENT
Mr. Gill is a 70 M history of HTN, CVA who presents after being told that he was having a heart attack based on an ekg yesterday.    - he has no symptoms at this time to suggest any cardiac pathology  - his ekg yesterday had <0.5 mm JEFF inferiorly without reciprocal changes, unchanged from prior office tracings.   - his ekg today is unchanged and unremarkable  - cardiac enzymes are unrevealing  - had a stress test in 2019 that showed no ischemia  - no cardiac contraindication to d/c home. He is not having an MI. He will follow up with Dr. Bush in our office. Mr. Gill is a 70 M history of HTN, CVA who presents after being told that he was having a heart attack based on an ekg yesterday.    - he has no symptoms at this time to suggest any cardiac pathology  - his ekg yesterday had <0.5 mm JEFF inferiorly without reciprocal changes, unchanged from prior office tracings.   - his ekg today is unchanged and unremarkable  - cardiac enzymes are unrevealing  - had a stress test in 2019 that showed no ischemia  - cont asa 81 and metoprolol  - no cardiac contraindication to d/c home. He is not having an MI. He will follow up with Dr. Bush in our office.

## 2021-04-08 NOTE — CONSULT NOTE ADULT - SUBJECTIVE AND OBJECTIVE BOX
[facility] - Eleazar, Jona, Alix, Ghada, Jian, Cindy Hope  Office Number: [phone]    Initial Consult Note    CHIEF COMPLAINT: Patient is a 70y old  Male who presents with a chief complaint of abn ekg    HPI:  70 M history of HTN, CVA, dementia sent in by Dr. Bush for evaluation. per patient he has been in [place] for the past 3 weeks returned a few days ago. Went to PMD Dr. Rahman for check up, had EKG done and was told to go to [place] ER immediately. patient declined, went home and relaxed instead. This morning called the office for results of "cardiac enzyme" which was not back. Again told to go to the ER. He called Dr. Bush who told him to go to the ER. patient denies symptoms now or yesterday. No dizziness, chest pain, shortness of breath, nausea, vomiting, lower extremity swelling. patient took 2 asa this morning.    PAST MEDICAL & SURGICAL HISTORY:  Retinal arterial branch occlusion, right  vision loss right eye    Backache  spinal stenosis    CVA (cerebral infarction)  6/2014- small rt ant.cerebral artery subacute infarct    Depression    UTI (lower urinary tract infection)  mssa    Retention of urine  6/2014    Anemia    Seizure  6/2014- abnormal eeg    FTT (failure to thrive) in adult  6/2014    Hyponatremia  124 in June 2014    HTN (hypertension)    Alcohol abuse  hx of etoh abuse    No significant past surgical history        SOCIAL HISTORY:  No tobacco, ethanol, or drug abuse.    FAMILY HISTORY:  Family history unknown      No family history of acute MI or sudden cardiac death.    MEDICATIONS  (STANDING):    MEDICATIONS  (PRN):      Allergies    No Known Allergies    Intolerances        REVIEW OF SYSTEMS:    CONSTITUTIONAL: No weakness, fevers or chills  EYES/ENT: No visual changes;  No vertigo or throat pain   NECK: No pain or stiffness  RESPIRATORY: No cough, wheezing, hemoptysis; No shortness of breath  CARDIOVASCULAR: No chest pain or palpitations  GASTROINTESTINAL: No abdominal pain. No nausea, vomiting, or hematemesis; No diarrhea or constipation. No melena or hematochezia.  GENITOURINARY: No dysuria, frequency or hematuria  NEUROLOGICAL: No numbness or weakness  SKIN: No itching or rash  All other review of systems is negative unless indicated above    VITAL SIGNS:   Vital Signs Last 24 Hrs  T(C): 36.1 (08 Apr 2021 12:02), Max: 36.1 (08 Apr 2021 12:02)  T(F): 97 (08 Apr 2021 12:02), Max: 97 (08 Apr 2021 12:02)  HR: 81 (08 Apr 2021 12:02) (81 - 81)  BP: 122/66 (08 Apr 2021 12:02) (122/66 - 122/66)  BP(mean): --  RR: 18 (08 Apr 2021 12:02) (18 - 18)  SpO2: 98% (08 Apr 2021 12:02) (98% - 98%)    I&O's Summary      On Exam:    Constitutional: NAD, alert and oriented x 3  Lungs:  Non-labored, breath sounds are clear bilaterally, No wheezing, rales or rhonchi  Cardiovascular: RRR.  S1 and S2 positive.  No murmurs, rubs, gallops or clicks  Gastrointestinal: Bowel Sounds present, soft, nontender.   Lymph: No peripheral edema. No cervical lymphadenopathy.  Neurological: Alert, no focal deficits  Skin: No rashes or ulcers   Psych:  Mood & affect appropriate.    LABS: All Labs Reviewed:                        14.4   4.88  )-----------( 232      ( 08 Apr 2021 12:55 )             43.4     08 Apr 2021 12:55    139    |  107    |  11     ----------------------------<  115    5.0     |  28     |  1.10     Ca    9.5        08 Apr 2021 12:55    TPro  7.2    /  Alb  3.8    /  TBili  0.4    /  DBili  x      /  AST  15     /  ALT  18     /  AlkPhos  66     08 Apr 2021 12:55    PT/INR - ( 08 Apr 2021 12:55 )   PT: 12.5 sec;   INR: 1.07 ratio         PTT - ( 08 Apr 2021 12:55 )  PTT:34.7 sec  CARDIAC MARKERS ( 08 Apr 2021 12:55 )  <.015 ng/mL / x     / x     / x     / x          Blood Culture:         RADIOLOGY:    EKG: sr

## 2021-06-18 ENCOUNTER — RX RENEWAL (OUTPATIENT)
Age: 71
End: 2021-06-18

## 2021-09-18 NOTE — ED ADULT TRIAGE NOTE - PRO INTERPRETER NEED 2
Medicare wellness visit completed . Pt & daughter given brochures on 5 wishes , HCPOA , DNR/POLST . Lab results conveyed . Medications reconciled , refills sent . Labs , dexa scan , and mammogram ordered . Urology referral given . Wear wrist splints hs . Infleunza , and Shingrix vaccine administered  . All questions answered in lay terms . Total time of care, including medicare wellness visit, and Coordination of medical care, was 40 minutes. Lab , then RV in 3 months .    English

## 2021-09-21 ENCOUNTER — RX RENEWAL (OUTPATIENT)
Age: 71
End: 2021-09-21

## 2022-06-17 NOTE — ED ADULT NURSE NOTE - NEURO WDL
Detail Level: Detailed
Quality 130: Documentation Of Current Medications In The Medical Record: Current Medications Documented
Alert and oriented to person, place and time, memory intact, behavior appropriate to situation, PERRL.

## 2022-06-21 NOTE — PATIENT PROFILE ADULT. - EXTENSIONS OF SELF_ADULT
normal/ROM intact/normal gait/strength 5/5 bilateral upper extremities/strength 5/5 bilateral lower extremities Eyeglasses

## 2022-08-31 ENCOUNTER — INPATIENT (INPATIENT)
Facility: HOSPITAL | Age: 72
LOS: 3 days | Discharge: HOME | DRG: 377 | End: 2022-09-04
Attending: HOSPITALIST | Admitting: INTERNAL MEDICINE
Payer: MEDICARE

## 2022-08-31 VITALS
OXYGEN SATURATION: 99 % | HEART RATE: 104 BPM | SYSTOLIC BLOOD PRESSURE: 134 MMHG | WEIGHT: 149.91 LBS | RESPIRATION RATE: 18 BRPM | DIASTOLIC BLOOD PRESSURE: 80 MMHG | HEIGHT: 68 IN | TEMPERATURE: 99 F

## 2022-08-31 DIAGNOSIS — R55 SYNCOPE AND COLLAPSE: ICD-10-CM

## 2022-08-31 LAB
ALBUMIN SERPL ELPH-MCNC: 3.2 G/DL — LOW (ref 3.3–5)
ALP SERPL-CCNC: 48 U/L — SIGNIFICANT CHANGE UP (ref 40–120)
ALT FLD-CCNC: 25 U/L — SIGNIFICANT CHANGE UP (ref 12–78)
ANION GAP SERPL CALC-SCNC: 2 MMOL/L — LOW (ref 5–17)
ANION GAP SERPL CALC-SCNC: 5 MMOL/L — SIGNIFICANT CHANGE UP (ref 5–17)
APTT BLD: 28.3 SEC — SIGNIFICANT CHANGE UP (ref 27.5–35.5)
AST SERPL-CCNC: 9 U/L — LOW (ref 15–37)
BASOPHILS # BLD AUTO: 0 K/UL — SIGNIFICANT CHANGE UP (ref 0–0.2)
BASOPHILS NFR BLD AUTO: 0 % — SIGNIFICANT CHANGE UP (ref 0–2)
BILIRUB SERPL-MCNC: 0.2 MG/DL — SIGNIFICANT CHANGE UP (ref 0.2–1.2)
BUN SERPL-MCNC: 30 MG/DL — HIGH (ref 7–23)
BUN SERPL-MCNC: 31 MG/DL — HIGH (ref 7–23)
CALCIUM SERPL-MCNC: 8.5 MG/DL — SIGNIFICANT CHANGE UP (ref 8.5–10.1)
CALCIUM SERPL-MCNC: 9.1 MG/DL — SIGNIFICANT CHANGE UP (ref 8.5–10.1)
CHLORIDE SERPL-SCNC: 112 MMOL/L — HIGH (ref 96–108)
CHLORIDE SERPL-SCNC: 112 MMOL/L — HIGH (ref 96–108)
CO2 SERPL-SCNC: 26 MMOL/L — SIGNIFICANT CHANGE UP (ref 22–31)
CO2 SERPL-SCNC: 27 MMOL/L — SIGNIFICANT CHANGE UP (ref 22–31)
CREAT SERPL-MCNC: 1.23 MG/DL — SIGNIFICANT CHANGE UP (ref 0.5–1.3)
CREAT SERPL-MCNC: 1.43 MG/DL — HIGH (ref 0.5–1.3)
EGFR: 53 ML/MIN/1.73M2 — LOW
EGFR: 64 ML/MIN/1.73M2 — SIGNIFICANT CHANGE UP
EOSINOPHIL # BLD AUTO: 0.06 K/UL — SIGNIFICANT CHANGE UP (ref 0–0.5)
EOSINOPHIL NFR BLD AUTO: 0.6 % — SIGNIFICANT CHANGE UP (ref 0–6)
GLUCOSE SERPL-MCNC: 103 MG/DL — HIGH (ref 70–99)
GLUCOSE SERPL-MCNC: 153 MG/DL — HIGH (ref 70–99)
HCT VFR BLD CALC: 20.3 % — CRITICAL LOW (ref 39–50)
HCT VFR BLD CALC: 22.2 % — LOW (ref 39–50)
HCT VFR BLD CALC: 28.1 % — LOW (ref 39–50)
HCT VFR BLD CALC: 30.5 % — LOW (ref 39–50)
HGB BLD-MCNC: 10 G/DL — LOW (ref 13–17)
HGB BLD-MCNC: 6.6 G/DL — CRITICAL LOW (ref 13–17)
HGB BLD-MCNC: 7.5 G/DL — LOW (ref 13–17)
HGB BLD-MCNC: 9.5 G/DL — LOW (ref 13–17)
IMM GRANULOCYTES NFR BLD AUTO: 0.4 % — SIGNIFICANT CHANGE UP (ref 0–1.5)
INR BLD: 1.13 RATIO — SIGNIFICANT CHANGE UP (ref 0.88–1.16)
LACTATE SERPL-SCNC: 3.3 MMOL/L — HIGH (ref 0.7–2)
LYMPHOCYTES # BLD AUTO: 1.03 K/UL — SIGNIFICANT CHANGE UP (ref 1–3.3)
LYMPHOCYTES # BLD AUTO: 11.1 % — LOW (ref 13–44)
MCHC RBC-ENTMCNC: 30.4 PG — SIGNIFICANT CHANGE UP (ref 27–34)
MCHC RBC-ENTMCNC: 30.6 PG — SIGNIFICANT CHANGE UP (ref 27–34)
MCHC RBC-ENTMCNC: 30.6 PG — SIGNIFICANT CHANGE UP (ref 27–34)
MCHC RBC-ENTMCNC: 31.3 PG — SIGNIFICANT CHANGE UP (ref 27–34)
MCHC RBC-ENTMCNC: 32.5 GM/DL — SIGNIFICANT CHANGE UP (ref 32–36)
MCHC RBC-ENTMCNC: 32.8 GM/DL — SIGNIFICANT CHANGE UP (ref 32–36)
MCHC RBC-ENTMCNC: 33.8 GM/DL — SIGNIFICANT CHANGE UP (ref 32–36)
MCHC RBC-ENTMCNC: 33.8 GM/DL — SIGNIFICANT CHANGE UP (ref 32–36)
MCV RBC AUTO: 90.6 FL — SIGNIFICANT CHANGE UP (ref 80–100)
MCV RBC AUTO: 92.4 FL — SIGNIFICANT CHANGE UP (ref 80–100)
MCV RBC AUTO: 92.7 FL — SIGNIFICANT CHANGE UP (ref 80–100)
MCV RBC AUTO: 94 FL — SIGNIFICANT CHANGE UP (ref 80–100)
MONOCYTES # BLD AUTO: 0.63 K/UL — SIGNIFICANT CHANGE UP (ref 0–0.9)
MONOCYTES NFR BLD AUTO: 6.8 % — SIGNIFICANT CHANGE UP (ref 2–14)
NEUTROPHILS # BLD AUTO: 7.49 K/UL — HIGH (ref 1.8–7.4)
NEUTROPHILS NFR BLD AUTO: 81.1 % — HIGH (ref 43–77)
PLATELET # BLD AUTO: 154 K/UL — SIGNIFICANT CHANGE UP (ref 150–400)
PLATELET # BLD AUTO: 216 K/UL — SIGNIFICANT CHANGE UP (ref 150–400)
PLATELET # BLD AUTO: 222 K/UL — SIGNIFICANT CHANGE UP (ref 150–400)
PLATELET # BLD AUTO: 228 K/UL — SIGNIFICANT CHANGE UP (ref 150–400)
POTASSIUM SERPL-MCNC: 3.9 MMOL/L — SIGNIFICANT CHANGE UP (ref 3.5–5.3)
POTASSIUM SERPL-MCNC: 4.7 MMOL/L — SIGNIFICANT CHANGE UP (ref 3.5–5.3)
POTASSIUM SERPL-SCNC: 3.9 MMOL/L — SIGNIFICANT CHANGE UP (ref 3.5–5.3)
POTASSIUM SERPL-SCNC: 4.7 MMOL/L — SIGNIFICANT CHANGE UP (ref 3.5–5.3)
PROT SERPL-MCNC: 6.2 GM/DL — SIGNIFICANT CHANGE UP (ref 6–8.3)
PROTHROM AB SERPL-ACNC: 13.1 SEC — SIGNIFICANT CHANGE UP (ref 10.5–13.4)
RBC # BLD: 2.16 M/UL — LOW (ref 4.2–5.8)
RBC # BLD: 2.45 M/UL — LOW (ref 4.2–5.8)
RBC # BLD: 3.04 M/UL — LOW (ref 4.2–5.8)
RBC # BLD: 3.29 M/UL — LOW (ref 4.2–5.8)
RBC # FLD: 12 % — SIGNIFICANT CHANGE UP (ref 10.3–14.5)
RBC # FLD: 12 % — SIGNIFICANT CHANGE UP (ref 10.3–14.5)
RBC # FLD: 12.1 % — SIGNIFICANT CHANGE UP (ref 10.3–14.5)
RBC # FLD: 13.2 % — SIGNIFICANT CHANGE UP (ref 10.3–14.5)
SODIUM SERPL-SCNC: 141 MMOL/L — SIGNIFICANT CHANGE UP (ref 135–145)
SODIUM SERPL-SCNC: 143 MMOL/L — SIGNIFICANT CHANGE UP (ref 135–145)
TROPONIN I, HIGH SENSITIVITY RESULT: 13.12 NG/L — SIGNIFICANT CHANGE UP
TROPONIN I, HIGH SENSITIVITY RESULT: 7.02 NG/L — SIGNIFICANT CHANGE UP
TROPONIN I, HIGH SENSITIVITY RESULT: 7.84 NG/L — SIGNIFICANT CHANGE UP
WBC # BLD: 11.02 K/UL — HIGH (ref 3.8–10.5)
WBC # BLD: 8.15 K/UL — SIGNIFICANT CHANGE UP (ref 3.8–10.5)
WBC # BLD: 9.25 K/UL — SIGNIFICANT CHANGE UP (ref 3.8–10.5)
WBC # BLD: 9.29 K/UL — SIGNIFICANT CHANGE UP (ref 3.8–10.5)
WBC # FLD AUTO: 11.02 K/UL — HIGH (ref 3.8–10.5)
WBC # FLD AUTO: 8.15 K/UL — SIGNIFICANT CHANGE UP (ref 3.8–10.5)
WBC # FLD AUTO: 9.25 K/UL — SIGNIFICANT CHANGE UP (ref 3.8–10.5)
WBC # FLD AUTO: 9.29 K/UL — SIGNIFICANT CHANGE UP (ref 3.8–10.5)

## 2022-08-31 PROCEDURE — 70450 CT HEAD/BRAIN W/O DYE: CPT

## 2022-08-31 PROCEDURE — P9016: CPT

## 2022-08-31 PROCEDURE — 82962 GLUCOSE BLOOD TEST: CPT

## 2022-08-31 PROCEDURE — 83605 ASSAY OF LACTIC ACID: CPT

## 2022-08-31 PROCEDURE — 86922 COMPATIBILITY TEST ANTIGLOB: CPT

## 2022-08-31 PROCEDURE — 99223 1ST HOSP IP/OBS HIGH 75: CPT

## 2022-08-31 PROCEDURE — 84484 ASSAY OF TROPONIN QUANT: CPT

## 2022-08-31 PROCEDURE — 93010 ELECTROCARDIOGRAM REPORT: CPT

## 2022-08-31 PROCEDURE — C9113: CPT

## 2022-08-31 PROCEDURE — 82746 ASSAY OF FOLIC ACID SERUM: CPT

## 2022-08-31 PROCEDURE — 83735 ASSAY OF MAGNESIUM: CPT

## 2022-08-31 PROCEDURE — 99053 MED SERV 10PM-8AM 24 HR FAC: CPT

## 2022-08-31 PROCEDURE — 83550 IRON BINDING TEST: CPT

## 2022-08-31 PROCEDURE — 83540 ASSAY OF IRON: CPT

## 2022-08-31 PROCEDURE — 82728 ASSAY OF FERRITIN: CPT

## 2022-08-31 PROCEDURE — 84100 ASSAY OF PHOSPHORUS: CPT

## 2022-08-31 PROCEDURE — 97163 PT EVAL HIGH COMPLEX 45 MIN: CPT | Mod: GP

## 2022-08-31 PROCEDURE — 85014 HEMATOCRIT: CPT

## 2022-08-31 PROCEDURE — 86920 COMPATIBILITY TEST SPIN: CPT

## 2022-08-31 PROCEDURE — 93005 ELECTROCARDIOGRAM TRACING: CPT

## 2022-08-31 PROCEDURE — 93306 TTE W/DOPPLER COMPLETE: CPT

## 2022-08-31 PROCEDURE — 81003 URINALYSIS AUTO W/O SCOPE: CPT

## 2022-08-31 PROCEDURE — 85027 COMPLETE CBC AUTOMATED: CPT

## 2022-08-31 PROCEDURE — 85018 HEMOGLOBIN: CPT

## 2022-08-31 PROCEDURE — 99285 EMERGENCY DEPT VISIT HI MDM: CPT

## 2022-08-31 PROCEDURE — 70450 CT HEAD/BRAIN W/O DYE: CPT | Mod: 26

## 2022-08-31 PROCEDURE — 74177 CT ABD & PELVIS W/CONTRAST: CPT | Mod: MA

## 2022-08-31 PROCEDURE — 95819 EEG AWAKE AND ASLEEP: CPT | Mod: 26

## 2022-08-31 PROCEDURE — 82607 VITAMIN B-12: CPT

## 2022-08-31 PROCEDURE — 12345: CPT | Mod: NC

## 2022-08-31 PROCEDURE — 80053 COMPREHEN METABOLIC PANEL: CPT

## 2022-08-31 PROCEDURE — 36430 TRANSFUSION BLD/BLD COMPNT: CPT

## 2022-08-31 PROCEDURE — 97116 GAIT TRAINING THERAPY: CPT | Mod: GP

## 2022-08-31 PROCEDURE — 80048 BASIC METABOLIC PNL TOTAL CA: CPT

## 2022-08-31 PROCEDURE — 95819 EEG AWAKE AND ASLEEP: CPT

## 2022-08-31 PROCEDURE — 93306 TTE W/DOPPLER COMPLETE: CPT | Mod: 26

## 2022-08-31 PROCEDURE — 36415 COLL VENOUS BLD VENIPUNCTURE: CPT

## 2022-08-31 PROCEDURE — 99291 CRITICAL CARE FIRST HOUR: CPT

## 2022-08-31 PROCEDURE — 74177 CT ABD & PELVIS W/CONTRAST: CPT | Mod: 26

## 2022-08-31 PROCEDURE — 86921 COMPATIBILITY TEST INCUBATE: CPT

## 2022-08-31 PROCEDURE — 86905 BLOOD TYPING RBC ANTIGENS: CPT

## 2022-08-31 RX ORDER — ZOLPIDEM TARTRATE 10 MG/1
1 TABLET ORAL
Qty: 0 | Refills: 0 | DISCHARGE

## 2022-08-31 RX ORDER — OXYMETAZOLINE HYDROCHLORIDE 0.5 MG/ML
2 SPRAY NASAL
Refills: 0 | Status: DISCONTINUED | OUTPATIENT
Start: 2022-08-31 | End: 2022-09-04

## 2022-08-31 RX ORDER — LIDOCAINE 4 G/100G
1 CREAM TOPICAL EVERY 24 HOURS
Refills: 0 | Status: DISCONTINUED | OUTPATIENT
Start: 2022-08-31 | End: 2022-09-04

## 2022-08-31 RX ORDER — PANTOPRAZOLE SODIUM 20 MG/1
40 TABLET, DELAYED RELEASE ORAL
Refills: 0 | Status: DISCONTINUED | OUTPATIENT
Start: 2022-08-31 | End: 2022-08-31

## 2022-08-31 RX ORDER — METOPROLOL TARTRATE 50 MG
0 TABLET ORAL
Qty: 0 | Refills: 0 | DISCHARGE

## 2022-08-31 RX ORDER — OLANZAPINE 15 MG/1
5 TABLET, FILM COATED ORAL DAILY
Refills: 0 | Status: DISCONTINUED | OUTPATIENT
Start: 2022-08-31 | End: 2022-09-04

## 2022-08-31 RX ORDER — CLONAZEPAM 1 MG
0 TABLET ORAL
Qty: 0 | Refills: 0 | DISCHARGE

## 2022-08-31 RX ORDER — ACETAMINOPHEN 500 MG
650 TABLET ORAL EVERY 6 HOURS
Refills: 0 | Status: DISCONTINUED | OUTPATIENT
Start: 2022-08-31 | End: 2022-09-04

## 2022-08-31 RX ORDER — CLONAZEPAM 1 MG
1 TABLET ORAL
Qty: 0 | Refills: 0 | DISCHARGE

## 2022-08-31 RX ORDER — ACETAMINOPHEN 500 MG
0 TABLET ORAL
Qty: 0 | Refills: 0 | DISCHARGE

## 2022-08-31 RX ORDER — OLANZAPINE 15 MG/1
0 TABLET, FILM COATED ORAL
Qty: 0 | Refills: 0 | DISCHARGE

## 2022-08-31 RX ORDER — SERTRALINE 25 MG/1
50 TABLET, FILM COATED ORAL DAILY
Refills: 0 | Status: DISCONTINUED | OUTPATIENT
Start: 2022-08-31 | End: 2022-09-04

## 2022-08-31 RX ORDER — LANOLIN ALCOHOL/MO/W.PET/CERES
3 CREAM (GRAM) TOPICAL AT BEDTIME
Refills: 0 | Status: DISCONTINUED | OUTPATIENT
Start: 2022-08-31 | End: 2022-09-04

## 2022-08-31 RX ORDER — ZOLPIDEM TARTRATE 10 MG/1
5 TABLET ORAL AT BEDTIME
Refills: 0 | Status: DISCONTINUED | OUTPATIENT
Start: 2022-08-31 | End: 2022-09-04

## 2022-08-31 RX ORDER — SODIUM CHLORIDE 9 MG/ML
1000 INJECTION INTRAMUSCULAR; INTRAVENOUS; SUBCUTANEOUS
Refills: 0 | Status: DISCONTINUED | OUTPATIENT
Start: 2022-08-31 | End: 2022-09-01

## 2022-08-31 RX ORDER — SODIUM CHLORIDE 9 MG/ML
1000 INJECTION INTRAMUSCULAR; INTRAVENOUS; SUBCUTANEOUS ONCE
Refills: 0 | Status: COMPLETED | OUTPATIENT
Start: 2022-08-31 | End: 2022-08-31

## 2022-08-31 RX ORDER — ONDANSETRON 8 MG/1
4 TABLET, FILM COATED ORAL EVERY 8 HOURS
Refills: 0 | Status: DISCONTINUED | OUTPATIENT
Start: 2022-08-31 | End: 2022-09-04

## 2022-08-31 RX ORDER — TAMSULOSIN HYDROCHLORIDE 0.4 MG/1
0.4 CAPSULE ORAL AT BEDTIME
Refills: 0 | Status: DISCONTINUED | OUTPATIENT
Start: 2022-08-31 | End: 2022-09-04

## 2022-08-31 RX ORDER — CLONAZEPAM 1 MG
2 TABLET ORAL
Refills: 0 | Status: DISCONTINUED | OUTPATIENT
Start: 2022-08-31 | End: 2022-08-31

## 2022-08-31 RX ORDER — CLONAZEPAM 1 MG
1 TABLET ORAL
Refills: 0 | Status: DISCONTINUED | OUTPATIENT
Start: 2022-08-31 | End: 2022-09-04

## 2022-08-31 RX ORDER — MULTIVIT-MIN/FERROUS GLUCONATE 9 MG/15 ML
1 LIQUID (ML) ORAL DAILY
Refills: 0 | Status: DISCONTINUED | OUTPATIENT
Start: 2022-08-31 | End: 2022-09-04

## 2022-08-31 RX ORDER — SODIUM CHLORIDE 9 MG/ML
1000 INJECTION, SOLUTION INTRAVENOUS ONCE
Refills: 0 | Status: COMPLETED | OUTPATIENT
Start: 2022-08-31 | End: 2022-08-31

## 2022-08-31 RX ORDER — PANTOPRAZOLE SODIUM 20 MG/1
80 TABLET, DELAYED RELEASE ORAL ONCE
Refills: 0 | Status: COMPLETED | OUTPATIENT
Start: 2022-08-31 | End: 2022-08-31

## 2022-08-31 RX ORDER — PANTOPRAZOLE SODIUM 20 MG/1
8 TABLET, DELAYED RELEASE ORAL
Qty: 80 | Refills: 0 | Status: DISCONTINUED | OUTPATIENT
Start: 2022-08-31 | End: 2022-08-31

## 2022-08-31 RX ORDER — PANTOPRAZOLE SODIUM 20 MG/1
8 TABLET, DELAYED RELEASE ORAL
Qty: 80 | Refills: 0 | Status: DISCONTINUED | OUTPATIENT
Start: 2022-08-31 | End: 2022-09-02

## 2022-08-31 RX ADMIN — SODIUM CHLORIDE 1000 MILLILITER(S): 9 INJECTION INTRAMUSCULAR; INTRAVENOUS; SUBCUTANEOUS at 04:03

## 2022-08-31 RX ADMIN — PANTOPRAZOLE SODIUM 10 MG/HR: 20 TABLET, DELAYED RELEASE ORAL at 15:49

## 2022-08-31 RX ADMIN — SODIUM CHLORIDE 75 MILLILITER(S): 9 INJECTION INTRAMUSCULAR; INTRAVENOUS; SUBCUTANEOUS at 21:17

## 2022-08-31 RX ADMIN — OLANZAPINE 5 MILLIGRAM(S): 15 TABLET, FILM COATED ORAL at 22:31

## 2022-08-31 RX ADMIN — SODIUM CHLORIDE 75 MILLILITER(S): 9 INJECTION INTRAMUSCULAR; INTRAVENOUS; SUBCUTANEOUS at 15:12

## 2022-08-31 RX ADMIN — SODIUM CHLORIDE 1000 MILLILITER(S): 9 INJECTION, SOLUTION INTRAVENOUS at 15:45

## 2022-08-31 RX ADMIN — OXYMETAZOLINE HYDROCHLORIDE 2 SPRAY(S): 0.5 SPRAY NASAL at 22:32

## 2022-08-31 RX ADMIN — SODIUM CHLORIDE 1000 MILLILITER(S): 9 INJECTION INTRAMUSCULAR; INTRAVENOUS; SUBCUTANEOUS at 03:03

## 2022-08-31 RX ADMIN — PANTOPRAZOLE SODIUM 80 MILLIGRAM(S): 20 TABLET, DELAYED RELEASE ORAL at 04:16

## 2022-08-31 RX ADMIN — Medication 2 MILLIGRAM(S): at 10:01

## 2022-08-31 RX ADMIN — TAMSULOSIN HYDROCHLORIDE 0.4 MILLIGRAM(S): 0.4 CAPSULE ORAL at 22:28

## 2022-08-31 RX ADMIN — ZOLPIDEM TARTRATE 5 MILLIGRAM(S): 10 TABLET ORAL at 22:29

## 2022-08-31 RX ADMIN — PANTOPRAZOLE SODIUM 10 MG/HR: 20 TABLET, DELAYED RELEASE ORAL at 04:16

## 2022-08-31 RX ADMIN — PANTOPRAZOLE SODIUM 10 MG/HR: 20 TABLET, DELAYED RELEASE ORAL at 23:03

## 2022-08-31 RX ADMIN — Medication 1 DROP(S): at 22:31

## 2022-08-31 RX ADMIN — Medication 1 TABLET(S): at 22:29

## 2022-08-31 NOTE — ED ADULT NURSE NOTE - OBJECTIVE STATEMENT
Syncope with episode of diarrhea. Denies falling or hitting head, no obvious injuries, denies any complaints. Covered in stool, appears pale. Hx stroke with memory loss. VS stable at triage. Stool and linen cleaned off patient, IV in place 20 G left AC, blood labs sent to lab. Tachycardia on tele monitor.
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
## 2022-08-31 NOTE — ED ADULT NURSE NOTE - NSIMPLEMENTINTERV_GEN_ALL_ED
Implemented All Fall Risk Interventions:  [place] to call system. Call bell, personal items and telephone within reach. Instruct patient to call for assistance. Room bathroom lighting operational. Non-slip footwear when patient is off stretcher. Physically safe environment: no spills, clutter or unnecessary equipment. Stretcher in lowest position, wheels locked, appropriate side rails in place. Provide visual cue, wrist band, yellow gown, etc. Monitor gait and stability. Monitor for mental status changes and reorient to person, place, and time. Review medications for side effects contributing to fall risk. Reinforce activity limits and safety measures with patient and family.

## 2022-08-31 NOTE — ED ADULT TRIAGE NOTE - CHIEF COMPLAINT QUOTE
Syncope with episode of diarrhea. Denies hitting head, no obvious injuries, denies any complaints. Covered in stool, appears pale. Hx stroke with memory loss. Syncope with episode of diarrhea. Denies falling or hitting head, no obvious injuries, denies any complaints. Covered in stool, appears pale. Hx stroke with memory loss. VS stable at triage.

## 2022-08-31 NOTE — PHARMACOTHERAPY INTERVENTION NOTE - COMMENTS
Medication history complete. Medications and allergies reviewed with patient and confirmed with .
continuation of outpatient regimen

## 2022-08-31 NOTE — PROGRESS NOTE ADULT - SUBJECTIVE AND OBJECTIVE BOX
EGD with large ulcer in duodenal bulb with oozing visible vessel s/p treatment with bipolar cautery. Vessel completely obliterated.     Rec:  -cont PPI gtt today and tomorrow  -clears  -trend CBC and transfuse to hgb >8

## 2022-08-31 NOTE — PROGRESS NOTE ADULT - ASSESSMENT
Patient with melena, UGI bleed  with hypotension, hemorrhagic shock  will transfer ICU  transfuse 2 units, stat h/h  inr was normal  protonix drip  hx. of DU 2018  d/w GI will need endoscopy  some ckd

## 2022-08-31 NOTE — ED ADULT NURSE NOTE - CHIEF COMPLAINT QUOTE
Syncope with episode of diarrhea. Denies falling or hitting head, no obvious injuries, denies any complaints. Covered in stool, appears pale. Hx stroke with memory loss. VS stable at triage.
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
## 2022-08-31 NOTE — PROGRESS NOTE ADULT - SUBJECTIVE AND OBJECTIVE BOX
Responded to RRT    HPI:      71 year old male w hx anxiety PTSD w service dog, Hx hemorrhagic CVA, hx duodenul ulcer w gastritis 2018, chronic back pain, HTN who came to ED by EMS for syncope.   patient had eeg no seizure, was guaic positive.  This afternoon patient syncopized had bp to 60s with large black bowel movement  On arrival patient pale bp now 90  on monitor NSR   Patient was not on any anticoagulatant or blood thinning agents  PMH:      as above     ROS weakness, cant obtain due to lethargy       MEDICATIONS  (STANDING):  artificial  tears Solution 1 Drop(s) Both EYES two times a day  lidocaine   4% Patch 1 Patch Transdermal every 24 hours  multivitamin/minerals 1 Tablet(s) Oral daily  OLANZapine 5 milliGRAM(s) Oral daily  pantoprazole Infusion 8 mG/Hr (10 mL/Hr) IV Continuous <Continuous>  sertraline 50 milliGRAM(s) Oral daily  sodium chloride 0.9%. 1000 milliLiter(s) (75 mL/Hr) IV Continuous <Continuous>  tamsulosin 0.4 milliGRAM(s) Oral at bedtime    MEDICATIONS  (PRN):  acetaminophen     Tablet .. 650 milliGRAM(s) Oral every 6 hours PRN Temp greater or equal to 38C (100.4F), Mild Pain (1 - 3)  aluminum hydroxide/magnesium hydroxide/simethicone Suspension 30 milliLiter(s) Oral every 4 hours PRN Dyspepsia  clonazePAM  Tablet 1 milliGRAM(s) Oral four times a day PRN anxiety  melatonin 3 milliGRAM(s) Oral at bedtime PRN Insomnia  ondansetron Injectable 4 milliGRAM(s) IV Push every 8 hours PRN Nausea and/or Vomiting  oxymetazoline 0.05% Nasal Spray 2 Spray(s) Both Nostrils two times a day PRN Nasal Congestion  zolpidem 5 milliGRAM(s) Oral at bedtime PRN Insomnia  zolpidem 5 milliGRAM(s) Oral at bedtime PRN Insomnia      Height (cm): 172.7 (08-31 @ 02:36)  Weight (kg): 68 (08-31 @ 02:36)  BMI (kg/m2): 22.8 (08-31 @ 02:36)    ICU Vital Signs Last 24 Hrs  T(C): 36.8 (31 Aug 2022 08:56), Max: 37 (31 Aug 2022 02:36)  T(F): 98.3 (31 Aug 2022 08:56), Max: 98.6 (31 Aug 2022 02:36)  HR: 120 (31 Aug 2022 08:56) (96 - 120)  BP: 111/62 (31 Aug 2022 08:56) (111/62 - 155/76)  BP(mean): 75 (31 Aug 2022 06:07) (75 - 75)  ABP: --  ABP(mean): --  RR: 18 (31 Aug 2022 08:56) (18 - 18)  SpO2: 95% (31 Aug 2022 08:56) (95% - 100%)    O2 Parameters below as of 31 Aug 2022 08:56  Patient On (Oxygen Delivery Method): room air    Physical Exam    General - lethargic , pale  HEENT nc/at  neck suppl  lungs clear breathing comfortable  cv rrr  abdomen - soft, non tender  ext soft, non tender  GI abdomen soft, [place] melena    I&O's Summary                         9.5    8.15  )-----------( 222      ( 31 Aug 2022 07:50 )             28.1       08-31    141  |  112<H>  |  31<H>  ----------------------------<  103<H>  4.7   |  27  |  1.23    Ca    8.5      31 Aug 2022 07:50    TPro  6.2  /  Alb  3.2<L>  /  TBili  0.2  /  DBili  x   /  AST  9<L>  /  ALT  25  /  AlkPhos  48  08-31    DVT Prophylaxis:  venodynes                                                               Advanced Directives: Full Code

## 2022-08-31 NOTE — CONSULT NOTE ADULT - ASSESSMENT
71 year old male w hx anxiety PTSD w service dog, Hx CVA, hx duodenal ulcer w gastritis 2018, chronic back pain, HTN who came to ED by EMS for syncope  Black stool noted in ED and guaiac + with No CT evidence for active GI Bleed     s/p hypotension with melena -  rapid response team called  in ICU now     - monitor vitals  - monitor labs  - monitor for further episodes of melena   - NPO   - PPI drip    - transfusion prn    - EGD when medically stable     Stephany Teran GI PA for Dr. Del Rio          71 year old male w hx anxiety PTSD w service dog, Hx CVA, hx duodenal ulcer w gastritis 2018, chronic back pain, HTN who came to ED by EMS for syncope  Black stool noted in ED and guaiac + with No CT evidence for active GI Bleed     s/p hypotension with melena -  rapid response team called  in ICU now     - monitor vitals  - monitor labs  - monitor for further episodes of melena   - keep NPO   - PPI drip    - transfusion prn    - spoke with RN Jeet,  EGD and colonoscopy to be done later today     Stephany Teran GI PA for Dr. Del Rio          71 year old male w hx anxiety PTSD w service dog, Hx CVA, hx duodenal ulcer w gastritis 2018, chronic back pain, HTN who came to ED by EMS for syncope  Black stool noted in ED and guaiac + with No CT evidence for active GI Bleed     s/p hypotension with melena -  rapid response team called  in ICU now     - monitor vitals  - monitor labs  - monitor for further episodes of melena   - keep NPO   - PPI drip    - transfusion prn   -  EGD to be done today     Stephany Teran GI PA for Dr. Del Rio
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
## 2022-08-31 NOTE — PROGRESS NOTE ADULT - ASSESSMENT
(From H&P) 71 year old male w hx anxiety/PTSD w service dog, Hx CVA, hx duodenal ulcer w gastritis 2018, chronic back pain, HTN who came to ED by EMS for syncope. Patient reported that he went to a barbecue earlier in the day w friends. When he got home he did not feel that well. Took rancho mirianer x 2. Was petting his service dog and that is the last he remembers. He now lives in a basement apt and his landlord came down to check on him, and then called 911. Patient passed out and woke up covered in feces. Denied fall and denied injury. Patient had no complaint of pain, no nausea or vomiting or abd pain. Ambulance call report is not available for review. In ED /80      RR 18   T 98.6   99% RA. Stool described as blk and G+ by ED MD. CXR no acute infiltrate. EKG NSR. NS 1000 cc. CT scan pending. AIde was still cleaning up pt who had brown stool on his legs.    Admitted to hospital medicine service for further management.    Syncope  LOC with incontinence.  Continue to monitor on tele, no events thus far - SR-ST  - Troponin negative x2  - ECG  - F/u orthostatics, c/w gentle hydration  - UA pending  - Echo pending  - Check EEG  - PT, Neuro, EP consult     Anemia. Guaiac + stool. Drop in H/H  Had taken rancho seltzer since he did not feel well after barbecue (contains asa and takes Doans pills that contain methyl salicylate , an NSAID. CT ab/pelvis prelim reading w thickened bladder from prostate enlargement.  - C/w PPI BID  - Trend/Monitor H&H  - CLD, ADAT per GI  - GI consult     Elevated sCr  Did not meet KIDGO criteria for JANE  - Scr improved s/p IVF  - Follow up UA    Anxiety. PTSD  Chronic and stable  - Continue Clonazepam and Olanzapine     DVT ppx  - Continue SCDs     (From H&P) 71 year old male w hx anxiety/PTSD w service dog, Hx CVA, hx duodenal ulcer w gastritis 2018, chronic back pain, HTN who came to ED by EMS for syncope. Patient reported that he went to a barbecue earlier in the day w friends. When he got home he did not feel that well. Took rancho mirianer x 2. Was petting his service dog and that is the last he remembers. He now lives in a basement apt and his landlord came down to check on him, and then called 911. Patient passed out and woke up covered in feces. Denied fall and denied injury. Patient had no complaint of pain, no nausea or vomiting or abd pain. Ambulance call report is not available for review. In ED /80      RR 18   T 98.6   99% RA. Stool described as blk and G+ by ED MD. CXR no acute infiltrate. EKG NSR. NS 1000 cc. CT scan pending. AIde was still cleaning up pt who had brown stool on his legs.    Admitted to hospital medicine service for further management.    Interval Events: RRT @1430: Patient Minimally responsive. RRT called. Noted with large melanotic BM, hypotensive SBP 60-80s. IVF initiated. Consent for blood obtained by Daughter Sol over phone (patient unable to give consent at this time) 2U PRBCs ordered. Transferred to ICU. Case d/w GI Dr. Del Rio. Updated daughter Sol (HCP) about clinical change/ clinical course.    Suspected Upper GI Bleeding.   Acute Symptomatic Anemia.  Had taken rancho seltzer since he did not feel well after barbecue (contains asa and takes Doans pills that contain methyl salicylate , an NSAID. CT ab/pelvis prelim reading w/ thickened bladder from prostate enlargement.  - See Interval events above  - IVF, PRBCs, PPI  - Case d/w GI  - NPO  - Transfer to ICU    Syncope  Etiology unclear, possibly related to above. R/o other causes. Noted with LOC with incontinence.   - Continue to monitor on tele, no events thus far - SR-ST  - Troponin negative x2  - F/u orthostatics, c/w gentle hydration  - UA, Echo, EEG pending   - PT, Neuro, EP consult     Elevated sCr  Did not meet KIDGO criteria for JANE  - Scr improved s/p IVF  - Follow up UA    Anxiety. PTSD  Chronic and stable  - Continue Clonazepam, Olanzapine, Sertraline     DVT ppx  - Continue SCDs given GI bleeding    Transfer to ICU Service.   (From H&P) 71 year old male w hx anxiety/PTSD w service dog, Hx CVA, hx duodenal ulcer w gastritis 2018, chronic back pain, HTN who came to ED by EMS for syncope. Patient reported that he went to a barbecue earlier in the day w friends. When he got home he did not feel that well. Took rancho osoriotzer x 2. Was petting his service dog and that is the last he remembers. He now lives in a basement apt and his landlord came down to check on him, and then called 911. Patient passed out and woke up covered in feces. Denied fall and denied injury. Patient had no complaint of pain, no nausea or vomiting or abd pain. Ambulance call report is not available for review. In ED /80      RR 18   T 98.6   99% RA. Stool described as blk and G+ by ED MD. CXR no acute infiltrate. EKG NSR. NS 1000 cc. CT scan pending. AIde was still cleaning up pt who had brown stool on his legs.    Admitted to hospital medicine service for further management.    Interval Events: RRT @1430: Patient Minimally responsive. RRT called. Noted with large melanotic BM, hypotensive SBP 60-80s. IVF initiated. Consent for blood obtained by Daughter Sol over phone (patient unable to give consent at this time) 2U PRBCs ordered. Transferred to ICU. Case d/w GI Dr. Del Rio. Updated daughter Sol (HCP) about clinical change/ clinical course.    Suspected Upper GI Bleeding.   Acute Symptomatic Anemia. Hemorrhagic Shock.  Had taken rancho seltzer since he did not feel well after barbecue (contains asa and takes Doans pills that contain methyl salicylate , an NSAID. CT ab/pelvis prelim reading w/ thickened bladder from prostate enlargement, no acute pathology. Known Duodenal Ulcer from 2018.  - See Interval events above  - IVF, PRBCs, PPI  - Case d/w GI  - NPO  - Transfer to ICU    Syncope  Etiology unclear, possibly related to above. R/o other causes. Noted with LOC with incontinence.   - Continue to monitor on tele, no events thus far - SR-ST  - Troponin negative x2  - F/u orthostatics, c/w gentle hydration  - UA, Echo, EEG pending   - PT, Neuro, EP consult     Elevated sCr  Did not meet KIDGO criteria for JANE  - Scr improved s/p IVF  - Follow up UA    Anxiety. PTSD  Chronic and stable  - Continue Clonazepam, Olanzapine, Sertraline     DVT ppx  - Continue SCDs given GI bleeding    Transfer to ICU Service.

## 2022-08-31 NOTE — H&P ADULT - ASSESSMENT
#Syncope  #Guaiac + stool  had taken rancho seltzer since he did not feel welll after barbeque  #Drop in H/H  today 10/30.5 vs 14.4/43.4 04-  1. admit to telemetry  2. serial trop  3. orthostatic BP  4. s/p NS 1000 cc  5. continue protonix gtt  6. trend CBC in AM  7. GI consult      #elevated Cr  today 1.43 vs 1.10 on 04-  1. s/p NS 1000 cc in ED      #Anxiety  #PTSD  1. clonezapam 2 mg BID  2. olezapine dose unknown      #VTE  recent bleed w G+ stool  no prophylaxis        #MISC  1. SW in AM re issues w service dog     #Syncope  #Guaiac + stool  had taken rancho seltzer since he did not feel welll after barbeque (contains asa  and takes Doans pills that contain methyl salicylate , an NSAID  #Drop in H/H  today 10/30.5 vs 14.4/43.4 04-  1. admit to telemetry  2. serial trop  3. orthostatic BP  4. s/p NS 1000 cc  5. continue protonix gtt  6. trend CBC in AM  7. GI consult      #elevated Cr  today 1.43 vs 1.10 on 04-  1. s/p NS 1000 cc in ED      #Anxiety  #PTSD  1. clonezapam 2 mg BID  2. olezapine dose unknown      #VTE  recent bleed w G+ stool  no prophylaxis        #MISC  Pt is very concerned about staying since he has a service dog  Dog can get in and out for bathroom unassisted  1. SW in AM re issues w service dog  2. if H/H stable may be able to pursue further work up as outpt     #Syncope  #Guaiac + stool  had taken rancho seltzer since he did not feel welll after barbeque (contains asa  and takes Doans pills that contain methyl salicylate , an NSAID  #Drop in H/H  today 10/30.5 vs 14.4/43.4 04-  1. admit to telemetry  2. serial trop  3. orthostatic BP  4. s/p NS 1000 cc  5. continue protonix gtt  6. trend CBC in AM  7. GI consult  8. CT ab/pelvis pending      #elevated Cr  today 1.43 vs 1.10 on 04-  1. s/p NS 1000 cc in ED      #Anxiety  #PTSD  1. clonezapam 2 mg BID  2. olezapine dose unknown      #VTE  recent bleed w G+ stool  no prophylaxis        #MISC  Pt is very concerned about staying since he has a service dog  Dog can get in and out for bathroom unassisted  1. SW in AM re issues w service dog  2. if H/H stable may be able to pursue further work up as outpt     #Syncope  #Guaiac + stool  had taken rancho seltzer since he did not feel welll after barbeque (contains asa  and takes Doans pills that contain methyl salicylate , an NSAID  #Drop in H/H  today 10/30.5 vs 14.4/43.4 04-  CTH no acute changes  1. admit to telemetry  2. serial trop  3. orthostatic BP  4. s/p NS 1000 cc  5. continue protonix gtt  6. trend CBC in AM  7. GI consult  8. CT ab/pelvis prelim reading w thickened bladder from prostate enlargement      #elevated Cr  today 1.43 vs 1.10 on 04-  1. s/p NS 1000 cc in ED  2. needs UA      #Anxiety  #PTSD  1. clonezapam 2 mg BID  2. olezapine dose unknown      #VTE  recent bleed w G+ stool  no prophylaxis        #MISC  Pt is very concerned about staying since he has a service dog  Dog can get in and out for bathroom unassisted  1. SW in AM re issues w service dog  2. if H/H stable may be able to pursue further work up as outpt

## 2022-08-31 NOTE — CONSULT NOTE ADULT - ASSESSMENT
71 year old man with multiple medical problems who presents after an episode of loss of consciousness.    An EEG report from 2014 states that he had right hemisphere slowing and sharp waves. 71 year old man with multiple medical problems who presents after an episode of loss of consciousness.  An EEG report from 2014 states that he had right hemisphere slowing and sharp waves.    Unable to obtain more history at this time regarding history of seizure vs syncope.  EEG today shows mild generalized slowing.  Current status is likely related to hypotension and possible drop in Hb/Hct (repeat CBC pending) in setting of GI bleed.    Would hold off on anticonvulsants at this time.   Will obtain more information when patient is stabilized and if there is concern for seizures will repeat EEG.    Discussed with Janay Castañeda NP.

## 2022-08-31 NOTE — H&P ADULT - NSHPPHYSICALEXAM_GEN_ALL_CORE
Vital Signs Last 24 Hrs  T(C): 37 (31 Aug 2022 02:36), Max: 37 (31 Aug 2022 02:36)  T(F): 98.6 (31 Aug 2022 02:36), Max: 98.6 (31 Aug 2022 02:36)  HR: 104 (31 Aug 2022 02:36) (104 - 104)  BP: 134/80 (31 Aug 2022 02:36) (134/80 - 134/80)  BP(mean): --  RR: 18 (31 Aug 2022 02:36) (18 - 18)  SpO2: 99% (31 Aug 2022 02:36) (99% - 99%)    Parameters below as of 31 Aug 2022 02:36  Patient On (Oxygen Delivery Method): room air

## 2022-08-31 NOTE — H&P ADULT - NSHPOUTPATIENTPROVIDERS_GEN_ALL_CORE
PCP Dr. Gerardo Shaw [phone]  Psych Dr. Alvin Prince [phone] PCP Dr. Gerardo Shaw [phone]  Psych Dr. Alvin Prince [phone]  [facility] at [place] [address] [place]

## 2022-08-31 NOTE — ED PROVIDER NOTE - OBJECTIVE STATEMENT
72 yo male biba from home s/p syncopal episode incontinent of urine and stool. Pt states he got up to go to the bathroom, stopped to pet his dog, he stood up suddenly, then he passed out. 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

## 2022-08-31 NOTE — ED PROVIDER NOTE - PROGRESS NOTE DETAILS
monroe positive, [id number], exp 1/31/23. dark black stool, spoke with pt and Dr. Patrick tba, protonix ordered ESTER Ochoa DO

## 2022-08-31 NOTE — PATIENT PROFILE ADULT - FALL HARM RISK - HARM RISK INTERVENTIONS

## 2022-08-31 NOTE — ED PROVIDER NOTE - NSICDXPASTMEDICALHX_GEN_ALL_CORE_FT
PAST MEDICAL HISTORY:  Alcohol abuse hx of etoh abuse    Anemia     Backache spinal stenosis    CVA (cerebral infarction) 6/2014- small rt ant.cerebral artery subacute infarct    Depression     FTT (failure to thrive) in adult 6/2014    HTN (hypertension)     Hyponatremia 124 in June 2014    Retention of urine 6/2014    Retinal arterial branch occlusion, right vision loss right eye    Seizure 6/2014- abnormal eeg    UTI (lower urinary tract infection) mssa

## 2022-08-31 NOTE — ED PROVIDER NOTE - PHYSICAL EXAMINATION
Gen:  pale, pleasant man  Head:  NC/AT  HEENT: eomi  Cardiac: S1S2, tachy  Abd: Soft, non tender, non distended  Resp: No distress, CTA   musculoskeletal:: no deformities, no swelling, strength +5/+5  Skin: warm and dry as visualized, no rashes  Neuro: KEY, aao x 4  Psych:alert, cooperative, appropriate mood and affect for situation

## 2022-08-31 NOTE — PROGRESS NOTE ADULT - SUBJECTIVE AND OBJECTIVE BOX
Chief Complaint: Syncope. GI bleed    Interval Events/ROS/Subjective:  8/31/22:    All other review of systems is negative unless indicated above    Physical Exam:  T(C): 36.8 (31 Aug 2022 08:56), Max: 37 (31 Aug 2022 02:36)  T(F): 98.3 (31 Aug 2022 08:56), Max: 98.6 (31 Aug 2022 02:36)  HR: 120 (31 Aug 2022 08:56) (96 - 120)  BP: 111/62 (31 Aug 2022 08:56) (111/62 - 155/76)  BP(mean): 75 (31 Aug 2022 06:07) (75 - 75)  RR: 18 (31 Aug 2022 08:56) (18 - 18)  SpO2: 95% (31 Aug 2022 08:56) (95% - 100%) room air    Constitutional: NAD, awake and alert  HEENT: PERR, EOMI, Normal Hearing, MMM  Neck: Soft and supple, No LAD, No JVD  Respiratory: Breath sounds are clear bilaterally, No wheezing, rales or rhonchi  Cardiovascular: S1 and S2, regular rate and rhythm, no Murmurs, gallops or rubs  Gastrointestinal: Bowel Sounds present, soft, nontender, nondistended, no guarding, no rebound  Extremities: No peripheral edema  Vascular: 2+ peripheral pulses  Neurological: A/O x 3, no focal deficits  Musculoskeletal: 5/5 strength b/l upper and lower extremities  Skin: No rashes    Labs:                        9.5    8.15  )-----------( 222      ( 31 Aug 2022 07:50 )             28.1     08-31    141  |  112<H>  |  31<H>  ----------------------------<  103<H>  4.7   |  27  |  1.23    Ca    8.5      31 Aug 2022 07:50    TPro  6.2  /  Alb  3.2<L>  /  TBili  0.2  /  DBili  x   /  AST  9<L>  /  ALT  25  /  AlkPhos  48  08-31    PT/INR - ( 31 Aug 2022 03:08 )   PT: 13.1 sec;   INR: 1.13 ratio       PTT - ( 31 Aug 2022 03:08 )  PTT:28.3 sec    Micro:    8/31/22: COVID19 PCR (-)    Radiology:    8/31/22: CT Abdomen and Pelvis w/ IV Cont: No CT evidence for active gastrointestinal bleeding. Thick-walled trabeculated bladder, may be secondary to chronic outlet obstruction. Other findings as discussed above.     8/31/22: CT Head No Cont: Stable exam. No acute intracranial hemorrhage, vasogenic edema, extra-axial collection or hydrocephalus.    Cardiac Testing:    TroponinI hsT: <-7.84, <-7.02    Medications:  clonazePAM  Tablet 2 milliGRAM(s) Oral two times a day  lidocaine   4% Patch 1 Patch Transdermal every 24 hours  pantoprazole  Injectable 40 milliGRAM(s) IV Push two times a day    MEDICATIONS  (PRN):  acetaminophen     Tablet .. 650 milliGRAM(s) Oral every 6 hours PRN Temp greater or equal to 38C (100.4F), Mild Pain (1 - 3)  aluminum hydroxide/magnesium hydroxide/simethicone Suspension 30 milliLiter(s) Oral every 4 hours PRN Dyspepsia  melatonin 3 milliGRAM(s) Oral at bedtime PRN Insomnia  ondansetron Injectable 4 milliGRAM(s) IV Push every 8 hours PRN Nausea and/or Vomiting    Home Medications:  Afrin 0.05% nasal spray: 2 spray(s) nasal 2 times a day, As Needed (31 Aug 2022 09:32)  Ambien 10 mg oral tablet: 1 tab(s) orally once a day (at bedtime), As Needed (31 Aug 2022 09:32)  Clear Eyes 0.012% ophthalmic solution: 1 drop(s) to each affected eye once a day, As Needed (31 Aug 2022 09:32)  clonazePAM 1 mg oral tablet: 1 tab(s) orally 4 times a day, As Needed (31 Aug 2022 09:32)  Doans Pills 325 mg oral tablet: 1 tab(s) orally every 6 hours, As Needed (31 Aug 2022 09:32)  OLANZapine 5 mg oral tablet: 1 tab(s) orally once a day (31 Aug 2022 09:32)  One A Day Men&#x27;s Complete oral tablet: 1 tab(s) orally once a day (31 Aug 2022 09:32)  sertraline 50 mg oral tablet: 1 tab(s) orally once a day ***patient states that he cannot remember the name of this drug but if it was filled then he is taking it as directed*** (31 Aug 2022 09:32)  tamsulosin 0.4 mg oral capsule: 1 cap(s) orally once a day (31 Aug 2022 09:32)  Tylenol 325 mg oral tablet: 2 tab(s) orally every 6 hours, As Needed (31 Aug 2022 09:32)   Chief Complaint: Syncope. GI bleed    Interval Events/ROS/Subjective:  8/31/22: Attempted to see patient in AM, getting EEG study, went back to reassess patient at 2:30PM, minimally responsive. RRT called. Noted with large melanotic BM, hypotensive SBP 60-80s. IVF initiated. Consent for blood obtained by Daughter Sol over phone (patient unable to give consent at this time) 2U PRBCs ordered. Transferred to ICU. Case d/w GI Dr. Del Rio.     All other review of systems is negative unless indicated above    Physical Exam:  T(C): 36.8 (31 Aug 2022 08:56), Max: 37 (31 Aug 2022 02:36)  T(F): 98.3 (31 Aug 2022 08:56), Max: 98.6 (31 Aug 2022 02:36)  HR: 120 (31 Aug 2022 08:56) (96 - 120)  BP: 111/62 (31 Aug 2022 08:56) (111/62 - 155/76)  BP(mean): 75 (31 Aug 2022 06:07) (75 - 75)  RR: 18 (31 Aug 2022 08:56) (18 - 18)  SpO2: 95% (31 Aug 2022 08:56) (95% - 100%) room air    Constitutional: Awake, minimally responsive  HEENT: TERESA  Neck: Soft and supple   Respiratory: Breath sounds are clear bilaterally  Cardiovascular: S1 and S2, regular rate and rhythm, no Murmurs, gallops or rubs  Gastrointestinal: Bowel Sounds present, soft, nontender  Extremities: No peripheral edema  Vascular: 2+ peripheral pulses  Neurological: A/O x person, minimally responsive given current clinical condition   Musculoskeletal: 5/5 strength b/l upper and lower extremities (weakness)  Skin: No rashes    Labs:                        9.5    8.15  )-----------( 222      ( 31 Aug 2022 07:50 )             28.1     08-31    141  |  112<H>  |  31<H>  ----------------------------<  103<H>  4.7   |  27  |  1.23    Ca    8.5      31 Aug 2022 07:50    TPro  6.2  /  Alb  3.2<L>  /  TBili  0.2  /  DBili  x   /  AST  9<L>  /  ALT  25  /  AlkPhos  48  08-31    PT/INR - ( 31 Aug 2022 03:08 )   PT: 13.1 sec;   INR: 1.13 ratio       PTT - ( 31 Aug 2022 03:08 )  PTT:28.3 sec    Micro:    8/31/22: COVID19 PCR (-)    Radiology:    8/31/22: CT Abdomen and Pelvis w/ IV Cont: No CT evidence for active gastrointestinal bleeding. Thick-walled trabeculated bladder, may be secondary to chronic outlet obstruction. Other findings as discussed above.     8/31/22: CT Head No Cont: Stable exam. No acute intracranial hemorrhage, vasogenic edema, extra-axial collection or hydrocephalus.    Cardiac Testing:    TroponinI hsT: <-7.84, <-7.02    Medications:  MEDICATIONS  (STANDING):  artificial  tears Solution 1 Drop(s) Both EYES two times a day  lidocaine   4% Patch 1 Patch Transdermal every 24 hours  multivitamin/minerals 1 Tablet(s) Oral daily  OLANZapine 5 milliGRAM(s) Oral daily  pantoprazole Infusion 8 mG/Hr (10 mL/Hr) IV Continuous <Continuous>  sertraline 50 milliGRAM(s) Oral daily  sodium chloride 0.9%. 1000 milliLiter(s) (75 mL/Hr) IV Continuous <Continuous>  tamsulosin 0.4 milliGRAM(s) Oral at bedtime    MEDICATIONS  (PRN):  acetaminophen     Tablet .. 650 milliGRAM(s) Oral every 6 hours PRN Temp greater or equal to 38C (100.4F), Mild Pain (1 - 3)  aluminum hydroxide/magnesium hydroxide/simethicone Suspension 30 milliLiter(s) Oral every 4 hours PRN Dyspepsia  clonazePAM  Tablet 1 milliGRAM(s) Oral four times a day PRN anxiety  melatonin 3 milliGRAM(s) Oral at bedtime PRN Insomnia  ondansetron Injectable 4 milliGRAM(s) IV Push every 8 hours PRN Nausea and/or Vomiting  oxymetazoline 0.05% Nasal Spray 2 Spray(s) Both Nostrils two times a day PRN Nasal Congestion  zolpidem 5 milliGRAM(s) Oral at bedtime PRN Insomnia  zolpidem 5 milliGRAM(s) Oral at bedtime PRN Insomnia      Home Medications:  Afrin 0.05% nasal spray: 2 spray(s) nasal 2 times a day, As Needed (31 Aug 2022 09:32)  Ambien 10 mg oral tablet: 1 tab(s) orally once a day (at bedtime), As Needed (31 Aug 2022 09:32)  Clear Eyes 0.012% ophthalmic solution: 1 drop(s) to each affected eye once a day, As Needed (31 Aug 2022 09:32)  clonazePAM 1 mg oral tablet: 1 tab(s) orally 4 times a day, As Needed (31 Aug 2022 09:32)  Doans Pills 325 mg oral tablet: 1 tab(s) orally every 6 hours, As Needed (31 Aug 2022 09:32)  OLANZapine 5 mg oral tablet: 1 tab(s) orally once a day (31 Aug 2022 09:32)  One A Day Men&#x27;s Complete oral tablet: 1 tab(s) orally once a day (31 Aug 2022 09:32)  sertraline 50 mg oral tablet: 1 tab(s) orally once a day ***patient states that he cannot remember the name of this drug but if it was filled then he is taking it as directed*** (31 Aug 2022 09:32)  tamsulosin 0.4 mg oral capsule: 1 cap(s) orally once a day (31 Aug 2022 09:32)  Tylenol 325 mg oral tablet: 2 tab(s) orally every 6 hours, As Needed (31 Aug 2022 09:32)

## 2022-09-01 LAB
ALBUMIN SERPL ELPH-MCNC: 2.6 G/DL — LOW (ref 3.3–5)
ALP SERPL-CCNC: 34 U/L — LOW (ref 40–120)
ALT FLD-CCNC: 16 U/L — SIGNIFICANT CHANGE UP (ref 12–78)
ANION GAP SERPL CALC-SCNC: 3 MMOL/L — LOW (ref 5–17)
AST SERPL-CCNC: 11 U/L — LOW (ref 15–37)
BILIRUB SERPL-MCNC: 0.2 MG/DL — SIGNIFICANT CHANGE UP (ref 0.2–1.2)
BUN SERPL-MCNC: 30 MG/DL — HIGH (ref 7–23)
CALCIUM SERPL-MCNC: 8.2 MG/DL — LOW (ref 8.5–10.1)
CHLORIDE SERPL-SCNC: 116 MMOL/L — HIGH (ref 96–108)
CO2 SERPL-SCNC: 25 MMOL/L — SIGNIFICANT CHANGE UP (ref 22–31)
CREAT SERPL-MCNC: 0.99 MG/DL — SIGNIFICANT CHANGE UP (ref 0.5–1.3)
EGFR: 82 ML/MIN/1.73M2 — SIGNIFICANT CHANGE UP
GLUCOSE SERPL-MCNC: 108 MG/DL — HIGH (ref 70–99)
HCT VFR BLD CALC: 26.1 % — LOW (ref 39–50)
HCT VFR BLD CALC: 26.8 % — LOW (ref 39–50)
HGB BLD-MCNC: 8.7 G/DL — LOW (ref 13–17)
HGB BLD-MCNC: 8.9 G/DL — LOW (ref 13–17)
MAGNESIUM SERPL-MCNC: 2 MG/DL — SIGNIFICANT CHANGE UP (ref 1.6–2.6)
MCHC RBC-ENTMCNC: 30.6 PG — SIGNIFICANT CHANGE UP (ref 27–34)
MCHC RBC-ENTMCNC: 30.6 PG — SIGNIFICANT CHANGE UP (ref 27–34)
MCHC RBC-ENTMCNC: 33.2 GM/DL — SIGNIFICANT CHANGE UP (ref 32–36)
MCHC RBC-ENTMCNC: 33.3 GM/DL — SIGNIFICANT CHANGE UP (ref 32–36)
MCV RBC AUTO: 91.9 FL — SIGNIFICANT CHANGE UP (ref 80–100)
MCV RBC AUTO: 92.1 FL — SIGNIFICANT CHANGE UP (ref 80–100)
PHOSPHATE SERPL-MCNC: 2.5 MG/DL — SIGNIFICANT CHANGE UP (ref 2.5–4.5)
PLATELET # BLD AUTO: 156 K/UL — SIGNIFICANT CHANGE UP (ref 150–400)
PLATELET # BLD AUTO: 157 K/UL — SIGNIFICANT CHANGE UP (ref 150–400)
POTASSIUM SERPL-MCNC: 4.6 MMOL/L — SIGNIFICANT CHANGE UP (ref 3.5–5.3)
POTASSIUM SERPL-SCNC: 4.6 MMOL/L — SIGNIFICANT CHANGE UP (ref 3.5–5.3)
PROT SERPL-MCNC: 4.8 GM/DL — LOW (ref 6–8.3)
RBC # BLD: 2.84 M/UL — LOW (ref 4.2–5.8)
RBC # BLD: 2.91 M/UL — LOW (ref 4.2–5.8)
RBC # FLD: 13.7 % — SIGNIFICANT CHANGE UP (ref 10.3–14.5)
RBC # FLD: 13.8 % — SIGNIFICANT CHANGE UP (ref 10.3–14.5)
SODIUM SERPL-SCNC: 144 MMOL/L — SIGNIFICANT CHANGE UP (ref 135–145)
WBC # BLD: 8.99 K/UL — SIGNIFICANT CHANGE UP (ref 3.8–10.5)
WBC # BLD: 9.18 K/UL — SIGNIFICANT CHANGE UP (ref 3.8–10.5)
WBC # FLD AUTO: 8.99 K/UL — SIGNIFICANT CHANGE UP (ref 3.8–10.5)
WBC # FLD AUTO: 9.18 K/UL — SIGNIFICANT CHANGE UP (ref 3.8–10.5)

## 2022-09-01 PROCEDURE — 93010 ELECTROCARDIOGRAM REPORT: CPT

## 2022-09-01 PROCEDURE — 99233 SBSQ HOSP IP/OBS HIGH 50: CPT

## 2022-09-01 PROCEDURE — 99232 SBSQ HOSP IP/OBS MODERATE 35: CPT

## 2022-09-01 PROCEDURE — 99222 1ST HOSP IP/OBS MODERATE 55: CPT

## 2022-09-01 RX ORDER — SUCRALFATE 1 G
1 TABLET ORAL
Refills: 0 | Status: DISCONTINUED | OUTPATIENT
Start: 2022-09-01 | End: 2022-09-01

## 2022-09-01 RX ADMIN — SODIUM CHLORIDE 75 MILLILITER(S): 9 INJECTION INTRAMUSCULAR; INTRAVENOUS; SUBCUTANEOUS at 06:31

## 2022-09-01 RX ADMIN — Medication 1 GRAM(S): at 12:14

## 2022-09-01 RX ADMIN — OXYMETAZOLINE HYDROCHLORIDE 2 SPRAY(S): 0.5 SPRAY NASAL at 09:25

## 2022-09-01 RX ADMIN — SERTRALINE 50 MILLIGRAM(S): 25 TABLET, FILM COATED ORAL at 09:17

## 2022-09-01 RX ADMIN — OLANZAPINE 5 MILLIGRAM(S): 15 TABLET, FILM COATED ORAL at 09:17

## 2022-09-01 RX ADMIN — Medication 1 DROP(S): at 09:18

## 2022-09-01 RX ADMIN — PANTOPRAZOLE SODIUM 10 MG/HR: 20 TABLET, DELAYED RELEASE ORAL at 06:29

## 2022-09-01 RX ADMIN — PANTOPRAZOLE SODIUM 10 MG/HR: 20 TABLET, DELAYED RELEASE ORAL at 16:00

## 2022-09-01 RX ADMIN — ZOLPIDEM TARTRATE 5 MILLIGRAM(S): 10 TABLET ORAL at 21:41

## 2022-09-01 RX ADMIN — Medication 1 DROP(S): at 21:41

## 2022-09-01 RX ADMIN — Medication 1 MILLIGRAM(S): at 21:41

## 2022-09-01 RX ADMIN — Medication 1 MILLIGRAM(S): at 12:59

## 2022-09-01 RX ADMIN — OXYMETAZOLINE HYDROCHLORIDE 2 SPRAY(S): 0.5 SPRAY NASAL at 21:46

## 2022-09-01 RX ADMIN — TAMSULOSIN HYDROCHLORIDE 0.4 MILLIGRAM(S): 0.4 CAPSULE ORAL at 21:41

## 2022-09-01 RX ADMIN — Medication 1 GRAM(S): at 17:59

## 2022-09-01 RX ADMIN — Medication 1 TABLET(S): at 09:18

## 2022-09-01 NOTE — PROGRESS NOTE ADULT - SUBJECTIVE AND OBJECTIVE BOX
Patient is a 71y old  Male who presents with a chief complaint of Syncope  GI bleed (01 Sep 2022 09:20)    24 hour events:     PAST MEDICAL & SURGICAL HISTORY:  Retinal arterial branch occlusion, right  vision loss right eye      Backache  spinal stenosis      CVA (cerebral infarction)  6/2014- small rt ant.cerebral artery subacute infarct      Depression      UTI (lower urinary tract infection)  mssa      Retention of urine  6/2014      Anemia      Seizure  6/2014- abnormal eeg      FTT (failure to thrive) in adult  6/2014      Hyponatremia  124 in June 2014      HTN (hypertension)      Alcohol abuse  hx of etoh abuse      No significant past surgical history          Allergies    Allergy Status Unknown    Intolerances      REVIEW OF SYSTEMS: SEE BELOW       ICU Vital Signs Last 24 Hrs  T(C): 36.9 (01 Sep 2022 08:30), Max: 37.5 (31 Aug 2022 20:47)  T(F): 98.4 (01 Sep 2022 08:30), Max: 99.5 (31 Aug 2022 20:47)  HR: 111 (01 Sep 2022 15:00) (64 - 111)  BP: 140/77 (01 Sep 2022 15:00) (94/80 - 151/76)  BP(mean): 95 (01 Sep 2022 15:00) (61 - 113)  ABP: --  ABP(mean): --  RR: 16 (01 Sep 2022 15:00) (10 - 30)  SpO2: 99% (01 Sep 2022 15:00) (94% - 100%)    O2 Parameters below as of 01 Sep 2022 09:00  Patient On (Oxygen Delivery Method): room air            CAPILLARY BLOOD GLUCOSE          I&O's Summary    31 Aug 2022 07:01  -  01 Sep 2022 07:00  --------------------------------------------------------  IN: 3142 mL / OUT: 1200 mL / NET: 1942 mL            MEDICATIONS  (STANDING):  artificial  tears Solution 1 Drop(s) Both EYES two times a day  lidocaine   4% Patch 1 Patch Transdermal every 24 hours  multivitamin/minerals 1 Tablet(s) Oral daily  OLANZapine 5 milliGRAM(s) Oral daily  pantoprazole Infusion 8 mG/Hr (10 mL/Hr) IV Continuous <Continuous>  sertraline 50 milliGRAM(s) Oral daily  sucralfate 1 Gram(s) Oral four times a day  tamsulosin 0.4 milliGRAM(s) Oral at bedtime      MEDICATIONS  (PRN):  acetaminophen     Tablet .. 650 milliGRAM(s) Oral every 6 hours PRN Temp greater or equal to 38C (100.4F), Mild Pain (1 - 3)  aluminum hydroxide/magnesium hydroxide/simethicone Suspension 30 milliLiter(s) Oral every 4 hours PRN Dyspepsia  clonazePAM  Tablet 1 milliGRAM(s) Oral four times a day PRN anxiety  melatonin 3 milliGRAM(s) Oral at bedtime PRN Insomnia  ondansetron Injectable 4 milliGRAM(s) IV Push every 8 hours PRN Nausea and/or Vomiting  oxymetazoline 0.05% Nasal Spray 2 Spray(s) Both Nostrils two times a day PRN Nasal Congestion  zolpidem 5 milliGRAM(s) Oral at bedtime PRN Insomnia  zolpidem 5 milliGRAM(s) Oral at bedtime PRN Insomnia      PHYSICAL EXAM: SEE BELOW                          8.9    8.99  )-----------( 157      ( 01 Sep 2022 11:33 )             26.8       09-01    144  |  116<H>  |  30<H>  ----------------------------<  108<H>  4.6   |  25  |  0.99    Ca    8.2<L>      01 Sep 2022 05:32  Phos  2.5     09-01  Mg     2.0     09-01    TPro  4.8<L>  /  Alb  2.6<L>  /  TBili  0.2  /  DBili  x   /  AST  11<L>  /  ALT  16  /  AlkPhos  34<L>  09-01          PT/INR - ( 31 Aug 2022 03:08 )   PT: 13.1 sec;   INR: 1.13 ratio         PTT - ( 31 Aug 2022 03:08 )  PTT:28.3 sec             Patient is a 71y old  Male who presents with a chief complaint of Syncope  GI bleed (01 Sep 2022 09:20)    24 hour events: episode melena + bright blood per rectum during the day today   HD stable   H/H stable     PAST MEDICAL & SURGICAL HISTORY:  Retinal arterial branch occlusion, right  vision loss right eye      Backache  spinal stenosis      CVA (cerebral infarction)  6/2014- small rt ant.cerebral artery subacute infarct      Depression      UTI (lower urinary tract infection)  mssa      Retention of urine  6/2014      Anemia      Seizure  6/2014- abnormal eeg      FTT (failure to thrive) in adult  6/2014      Hyponatremia  124 in June 2014      HTN (hypertension)      Alcohol abuse  hx of etoh abuse      No significant past surgical history          Allergies    Allergy Status Unknown    Intolerances      REVIEW OF SYSTEMS: SEE BELOW       ICU Vital Signs Last 24 Hrs  T(C): 36.9 (01 Sep 2022 08:30), Max: 37.5 (31 Aug 2022 20:47)  T(F): 98.4 (01 Sep 2022 08:30), Max: 99.5 (31 Aug 2022 20:47)  HR: 111 (01 Sep 2022 15:00) (64 - 111)  BP: 140/77 (01 Sep 2022 15:00) (94/80 - 151/76)  BP(mean): 95 (01 Sep 2022 15:00) (61 - 113)  ABP: --  ABP(mean): --  RR: 16 (01 Sep 2022 15:00) (10 - 30)  SpO2: 99% (01 Sep 2022 15:00) (94% - 100%)    O2 Parameters below as of 01 Sep 2022 09:00  Patient On (Oxygen Delivery Method): room air            CAPILLARY BLOOD GLUCOSE          I&O's Summary    31 Aug 2022 07:01  -  01 Sep 2022 07:00  --------------------------------------------------------  IN: 3142 mL / OUT: 1200 mL / NET: 1942 mL            MEDICATIONS  (STANDING):  artificial  tears Solution 1 Drop(s) Both EYES two times a day  lidocaine   4% Patch 1 Patch Transdermal every 24 hours  multivitamin/minerals 1 Tablet(s) Oral daily  OLANZapine 5 milliGRAM(s) Oral daily  pantoprazole Infusion 8 mG/Hr (10 mL/Hr) IV Continuous <Continuous>  sertraline 50 milliGRAM(s) Oral daily  sucralfate 1 Gram(s) Oral four times a day  tamsulosin 0.4 milliGRAM(s) Oral at bedtime      MEDICATIONS  (PRN):  acetaminophen     Tablet .. 650 milliGRAM(s) Oral every 6 hours PRN Temp greater or equal to 38C (100.4F), Mild Pain (1 - 3)  aluminum hydroxide/magnesium hydroxide/simethicone Suspension 30 milliLiter(s) Oral every 4 hours PRN Dyspepsia  clonazePAM  Tablet 1 milliGRAM(s) Oral four times a day PRN anxiety  melatonin 3 milliGRAM(s) Oral at bedtime PRN Insomnia  ondansetron Injectable 4 milliGRAM(s) IV Push every 8 hours PRN Nausea and/or Vomiting  oxymetazoline 0.05% Nasal Spray 2 Spray(s) Both Nostrils two times a day PRN Nasal Congestion  zolpidem 5 milliGRAM(s) Oral at bedtime PRN Insomnia  zolpidem 5 milliGRAM(s) Oral at bedtime PRN Insomnia      PHYSICAL EXAM: SEE BELOW                          8.9    8.99  )-----------( 157      ( 01 Sep 2022 11:33 )             26.8       09-01    144  |  116<H>  |  30<H>  ----------------------------<  108<H>  4.6   |  25  |  0.99    Ca    8.2<L>      01 Sep 2022 05:32  Phos  2.5     09-01  Mg     2.0     09-01    TPro  4.8<L>  /  Alb  2.6<L>  /  TBili  0.2  /  DBili  x   /  AST  11<L>  /  ALT  16  /  AlkPhos  34<L>  09-01          PT/INR - ( 31 Aug 2022 03:08 )   PT: 13.1 sec;   INR: 1.13 ratio         PTT - ( 31 Aug 2022 03:08 )  PTT:28.3 sec

## 2022-09-01 NOTE — PROGRESS NOTE ADULT - ASSESSMENT
71 year old male with bleeding DU s/p endoscopic hemostasis 8/31.    - monitor CBC daily  - PPI drip until AM then change to protonix 40 mg po bid to treat for 8 weeks  - was biopsied for H pylori in pas and negative, no need to retest  -regular diet  -d/c home soon if stable

## 2022-09-01 NOTE — PROGRESS NOTE ADULT - SUBJECTIVE AND OBJECTIVE BOX
GI      HPI:  doing well  no abd pain  hgb stable  still with dark stool  macie clears      ROS  As above  Otherwise unremarkable, all systems reviewed    PE:  Vital Signs Last 24 Hrs  T(C): 37.9 (01 Sep 2022 20:00), Max: 37.9 (01 Sep 2022 20:00)  T(F): 100.3 (01 Sep 2022 20:00), Max: 100.3 (01 Sep 2022 20:00)  HR: 108 (01 Sep 2022 20:00) (68 - 111)  BP: 123/59 (01 Sep 2022 20:00) (95/45 - 151/76)  BP(mean): 77 (01 Sep 2022 20:00) (61 - 113)  RR: 17 (01 Sep 2022 20:00) (10 - 30)  SpO2: 98% (01 Sep 2022 20:00) (94% - 100%)    Parameters below as of 01 Sep 2022 20:00  Patient On (Oxygen Delivery Method): room air      Constitutional:   Anicteric   Respiratory: CTABL, breathing comfortably  Cardiovascular: S1 and S2, RRR  Gastrointestinal: +BS, soft, non tender, non distended, no mass  Extremities: warm, well perfused, no edema  Psychiatric: Normal mood, normal affect  Neuro: moves all extremities, grossly intact  Skin: No rashes or lesions    LABS:                                                         8.9    8.99  )-----------( 157      ( 01 Sep 2022 11:33 )             26.8

## 2022-09-01 NOTE — PROGRESS NOTE ADULT - SUBJECTIVE AND OBJECTIVE BOX
Interval History:  9/1/22: s/p EGD yesterday showing large ulcer in duodenal bulb with oozing vessels.      MEDICATIONS  (STANDING):  artificial  tears Solution 1 Drop(s) Both EYES two times a day  lidocaine   4% Patch 1 Patch Transdermal every 24 hours  multivitamin/minerals 1 Tablet(s) Oral daily  OLANZapine 5 milliGRAM(s) Oral daily  pantoprazole Infusion 8 mG/Hr (10 mL/Hr) IV Continuous <Continuous>  sertraline 50 milliGRAM(s) Oral daily  sodium chloride 0.9%. 1000 milliLiter(s) (75 mL/Hr) IV Continuous <Continuous>  tamsulosin 0.4 milliGRAM(s) Oral at bedtime    MEDICATIONS  (PRN):  acetaminophen     Tablet .. 650 milliGRAM(s) Oral every 6 hours PRN Temp greater or equal to 38C (100.4F), Mild Pain (1 - 3)  aluminum hydroxide/magnesium hydroxide/simethicone Suspension 30 milliLiter(s) Oral every 4 hours PRN Dyspepsia  clonazePAM  Tablet 1 milliGRAM(s) Oral four times a day PRN anxiety  melatonin 3 milliGRAM(s) Oral at bedtime PRN Insomnia  ondansetron Injectable 4 milliGRAM(s) IV Push every 8 hours PRN Nausea and/or Vomiting  oxymetazoline 0.05% Nasal Spray 2 Spray(s) Both Nostrils two times a day PRN Nasal Congestion  zolpidem 5 milliGRAM(s) Oral at bedtime PRN Insomnia  zolpidem 5 milliGRAM(s) Oral at bedtime PRN Insomnia      Allergies    Allergy Status Unknown    Intolerances        PHYSICAL EXAM:  Vital Signs Last 24 Hrs  T(F): 98 (09-01-22 @ 04:00)  HR: 69 (09-01-22 @ 07:00)  BP: 117/55 (09-01-22 @ 07:00)  RR: 13 (09-01-22 @ 07:00)    GENERAL: NAD, well-groomed, well-developed  HEAD:  Atraumatic, Normocephalic  Neuro:  Awake, alert, no aphasia  CN: PERRL, EOMI, no nystagmus, no facial weakness, tongue protrudes in the midline  motor: normal tone, no pronator drift, full strength in all four extremities  sensory: intact to light touch  coordination: finger to nose intact bilaterally  DTRs: symmetric, plantar responses flexor bilaterally    LABS:                        8.7    9.18  )-----------( 156      ( 01 Sep 2022 05:32 )             26.1     09-01    144  |  116<H>  |  30<H>  ----------------------------<  108<H>  4.6   |  25  |  0.99    Ca    8.2<L>      01 Sep 2022 05:32  Phos  2.5     09-01  Mg     2.0     09-01    TPro  4.8<L>  /  Alb  2.6<L>  /  TBili  0.2  /  DBili  x   /  AST  11<L>  /  ALT  16  /  AlkPhos  34<L>  09-01    PT/INR - ( 31 Aug 2022 03:08 )   PT: 13.1 sec;   INR: 1.13 ratio         PTT - ( 31 Aug 2022 03:08 )  PTT:28.3 sec      RADIOLOGY & ADDITIONAL STUDIES:  CT head 8/31/22:  Stable exam. No acute intracranial hemorrhage, vasogenic edema,   extra-axial collection or hydrocephalus.    EEG 8/31/22: Mild generalized slowing

## 2022-09-01 NOTE — PROGRESS NOTE ADULT - TIME BILLING
71M PMH PTSD, anxiety, CVA, DU, chronic back pain, HTN, presented with syncope     Found to have UGIB with acute blood loss anemia   Hypotensive but did not require pressor    EGD 8/31 showed a DU with active bleeding, s/p cautery   S/p 3 PRBCs     Now clinically stable  Continue PPI drip  Clears   Avoid AC, AP     OOB     Keep in ICU today
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
## 2022-09-01 NOTE — PROGRESS NOTE ADULT - ASSESSMENT
71 year old man with multiple medical problems who presents after an episode of loss of consciousness.  An EEG report from 2014 states that he had right hemisphere slowing and sharp waves.  On 8/31 rapid response was called due to decreased responsiveness. He was found to have a GI bleed, was transfused and taken to EGD where he was found to have a duodenal ulcer.    9/1:  He is feeling much better today.  He states that he had seizures in the past following his stroke (stroke may have been hemorrhagic; does not recall if he was ever on aspirin).  He states that he takes clonazepam 2 mg BID for seizure prevention with last seizure ~ 2 years ago.    -Will hold off on additional anticonvulsants at this time; he is on clonazepam  -Obtain more information regarding old stroke - hemorrhagic vs ischemic.   -Monitor CBC    Will f/u as needed.

## 2022-09-01 NOTE — CONSULT NOTE ADULT - NS ATTEND AMEND GEN_ALL_CORE FT
pt with syncope in the setting of acute blood loss anemia due to gastric ulcer s/p treatment, agree with a/p as above no other EP work up needed  continue current medical care
Events reviewed-pt more stable now s/p IVF  will do EGD today  cont PPI gtt  d/w pt

## 2022-09-01 NOTE — CONSULT NOTE ADULT - ASSESSMENT
71 year old male.  EP evaluating for LOC with incontinence.    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  Pt states that he has had seizures in the past (neuro following this pt)         8/31/2022 has rapid response for hemorrhagic shock,  Bloody stool found and received two units of blood (hgb was 6.6).         EGD with large ulcer in duodenal bulb with oozing visible vessel s/p treatment with bipolar cautery.  CM shows -110, no pauses, heart block or SVT/VT seen,  Troponin Level negataive  EKG shows NO sign of conduction disorder  LVEF 70% (ECHO)    Continue to hydrate  and monitor  Monitor H and H and observe for signs of active GI Bleed  Pt will follow with GI  No interventions from EP for LOC(considering the findings of EGD) and not complains fo dizziness or lightheadedness

## 2022-09-02 LAB
ANION GAP SERPL CALC-SCNC: 5 MMOL/L — SIGNIFICANT CHANGE UP (ref 5–17)
APPEARANCE UR: CLEAR — SIGNIFICANT CHANGE UP
BILIRUB UR-MCNC: NEGATIVE — SIGNIFICANT CHANGE UP
BUN SERPL-MCNC: 16 MG/DL — SIGNIFICANT CHANGE UP (ref 7–23)
CALCIUM SERPL-MCNC: 8.4 MG/DL — LOW (ref 8.5–10.1)
CHLORIDE SERPL-SCNC: 115 MMOL/L — HIGH (ref 96–108)
CO2 SERPL-SCNC: 24 MMOL/L — SIGNIFICANT CHANGE UP (ref 22–31)
COLOR SPEC: SIGNIFICANT CHANGE UP
CREAT SERPL-MCNC: 0.93 MG/DL — SIGNIFICANT CHANGE UP (ref 0.5–1.3)
DIFF PNL FLD: NEGATIVE — SIGNIFICANT CHANGE UP
EGFR: 89 ML/MIN/1.73M2 — SIGNIFICANT CHANGE UP
GLUCOSE SERPL-MCNC: 107 MG/DL — HIGH (ref 70–99)
GLUCOSE UR QL: NEGATIVE — SIGNIFICANT CHANGE UP
HCT VFR BLD CALC: 23.7 % — LOW (ref 39–50)
HGB BLD-MCNC: 8.2 G/DL — LOW (ref 13–17)
KETONES UR-MCNC: NEGATIVE — SIGNIFICANT CHANGE UP
LEUKOCYTE ESTERASE UR-ACNC: NEGATIVE — SIGNIFICANT CHANGE UP
MAGNESIUM SERPL-MCNC: 1.9 MG/DL — SIGNIFICANT CHANGE UP (ref 1.6–2.6)
MCHC RBC-ENTMCNC: 31.2 PG — SIGNIFICANT CHANGE UP (ref 27–34)
MCHC RBC-ENTMCNC: 34.6 GM/DL — SIGNIFICANT CHANGE UP (ref 32–36)
MCV RBC AUTO: 90.1 FL — SIGNIFICANT CHANGE UP (ref 80–100)
NITRITE UR-MCNC: NEGATIVE — SIGNIFICANT CHANGE UP
PH UR: 6 — SIGNIFICANT CHANGE UP (ref 5–8)
PHOSPHATE SERPL-MCNC: 2.6 MG/DL — SIGNIFICANT CHANGE UP (ref 2.5–4.5)
PLATELET # BLD AUTO: 165 K/UL — SIGNIFICANT CHANGE UP (ref 150–400)
POTASSIUM SERPL-MCNC: 3.7 MMOL/L — SIGNIFICANT CHANGE UP (ref 3.5–5.3)
POTASSIUM SERPL-SCNC: 3.7 MMOL/L — SIGNIFICANT CHANGE UP (ref 3.5–5.3)
PROT UR-MCNC: NEGATIVE — SIGNIFICANT CHANGE UP
RBC # BLD: 2.63 M/UL — LOW (ref 4.2–5.8)
RBC # FLD: 13.9 % — SIGNIFICANT CHANGE UP (ref 10.3–14.5)
SODIUM SERPL-SCNC: 144 MMOL/L — SIGNIFICANT CHANGE UP (ref 135–145)
SP GR SPEC: 1 — LOW (ref 1.01–1.02)
UROBILINOGEN FLD QL: NEGATIVE — SIGNIFICANT CHANGE UP
WBC # BLD: 7.02 K/UL — SIGNIFICANT CHANGE UP (ref 3.8–10.5)
WBC # FLD AUTO: 7.02 K/UL — SIGNIFICANT CHANGE UP (ref 3.8–10.5)

## 2022-09-02 PROCEDURE — 99233 SBSQ HOSP IP/OBS HIGH 50: CPT

## 2022-09-02 PROCEDURE — 99232 SBSQ HOSP IP/OBS MODERATE 35: CPT

## 2022-09-02 RX ORDER — CHLORHEXIDINE GLUCONATE 213 G/1000ML
1 SOLUTION TOPICAL DAILY
Refills: 0 | Status: DISCONTINUED | OUTPATIENT
Start: 2022-09-02 | End: 2022-09-04

## 2022-09-02 RX ORDER — INFLUENZA VIRUS VACCINE 15; 15; 15; 15 UG/.5ML; UG/.5ML; UG/.5ML; UG/.5ML
0.7 SUSPENSION INTRAMUSCULAR ONCE
Refills: 0 | Status: COMPLETED | OUTPATIENT
Start: 2022-09-02 | End: 2022-09-02

## 2022-09-02 RX ORDER — PANTOPRAZOLE SODIUM 20 MG/1
40 TABLET, DELAYED RELEASE ORAL EVERY 12 HOURS
Refills: 0 | Status: DISCONTINUED | OUTPATIENT
Start: 2022-09-02 | End: 2022-09-04

## 2022-09-02 RX ADMIN — OLANZAPINE 5 MILLIGRAM(S): 15 TABLET, FILM COATED ORAL at 09:35

## 2022-09-02 RX ADMIN — Medication 1 MILLIGRAM(S): at 21:24

## 2022-09-02 RX ADMIN — Medication 1 MILLIGRAM(S): at 17:24

## 2022-09-02 RX ADMIN — Medication 1 MILLIGRAM(S): at 09:34

## 2022-09-02 RX ADMIN — TAMSULOSIN HYDROCHLORIDE 0.4 MILLIGRAM(S): 0.4 CAPSULE ORAL at 21:25

## 2022-09-02 RX ADMIN — ZOLPIDEM TARTRATE 5 MILLIGRAM(S): 10 TABLET ORAL at 21:24

## 2022-09-02 RX ADMIN — PANTOPRAZOLE SODIUM 40 MILLIGRAM(S): 20 TABLET, DELAYED RELEASE ORAL at 21:25

## 2022-09-02 RX ADMIN — OXYMETAZOLINE HYDROCHLORIDE 2 SPRAY(S): 0.5 SPRAY NASAL at 09:34

## 2022-09-02 RX ADMIN — PANTOPRAZOLE SODIUM 10 MG/HR: 20 TABLET, DELAYED RELEASE ORAL at 00:55

## 2022-09-02 RX ADMIN — CHLORHEXIDINE GLUCONATE 1 APPLICATION(S): 213 SOLUTION TOPICAL at 12:30

## 2022-09-02 RX ADMIN — Medication 1 DROP(S): at 09:34

## 2022-09-02 RX ADMIN — Medication 1 DROP(S): at 21:25

## 2022-09-02 RX ADMIN — ZOLPIDEM TARTRATE 5 MILLIGRAM(S): 10 TABLET ORAL at 00:56

## 2022-09-02 RX ADMIN — Medication 3 MILLIGRAM(S): at 00:56

## 2022-09-02 RX ADMIN — SERTRALINE 50 MILLIGRAM(S): 25 TABLET, FILM COATED ORAL at 09:35

## 2022-09-02 RX ADMIN — Medication 1 TABLET(S): at 09:35

## 2022-09-02 RX ADMIN — OXYMETAZOLINE HYDROCHLORIDE 2 SPRAY(S): 0.5 SPRAY NASAL at 21:25

## 2022-09-02 NOTE — PROGRESS NOTE ADULT - SUBJECTIVE AND OBJECTIVE BOX
Patient is a 71y old  Male who presents with a chief complaint of Syncope  GI bleed (02 Sep 2022 08:55)    24 hour events: no further bleeding   HD stable     PAST MEDICAL & SURGICAL HISTORY:  Retinal arterial branch occlusion, right  vision loss right eye      Backache  spinal stenosis      CVA (cerebral infarction)  2014- small rt ant.cerebral artery subacute infarct      Depression      UTI (lower urinary tract infection)  mssa      Retention of urine  2014      Anemia      Seizure  2014- abnormal eeg      FTT (failure to thrive) in adult  2014      Hyponatremia  124 in 2014      HTN (hypertension)      Alcohol abuse  hx of etoh abuse      No significant past surgical history          Allergies    Allergy Status Unknown    Intolerances      REVIEW OF SYSTEMS: SEE BELOW       ICU Vital Signs Last 24 Hrs  T(C): 36.8 (02 Sep 2022 10:00), Max: 37.9 (01 Sep 2022 20:00)  T(F): 98.3 (02 Sep 2022 10:00), Max: 100.3 (01 Sep 2022 20:00)  HR: 83 (02 Sep 2022 12:00) (67 - 111)  BP: 119/61 (02 Sep 2022 12:00) (98/51 - 995/67)  BP(mean): 79 (02 Sep 2022 12:00) (67 - 102)  ABP: --  ABP(mean): --  RR: 13 (02 Sep 2022 12:00) (10 - 32)  SpO2: 97% (02 Sep 2022 12:00) (95% - 100%)    O2 Parameters below as of 02 Sep 2022 12:00  Patient On (Oxygen Delivery Method): room air            CAPILLARY BLOOD GLUCOSE          I&O's Summary    01 Sep 2022 07:01  -  02 Sep 2022 07:00  --------------------------------------------------------  IN: 1066.5 mL / OUT: 1201 mL / NET: -134.5 mL    02 Sep 2022 07:01  -  02 Sep 2022 12:53  --------------------------------------------------------  IN: 45 mL / OUT: 0 mL / NET: 45 mL            MEDICATIONS  (STANDING):  artificial  tears Solution 1 Drop(s) Both EYES two times a day  chlorhexidine 4% Liquid 1 Application(s) Topical daily  influenza  Vaccine (HIGH DOSE) 0.7 milliLiter(s) IntraMuscular once  lidocaine   4% Patch 1 Patch Transdermal every 24 hours  multivitamin/minerals 1 Tablet(s) Oral daily  OLANZapine 5 milliGRAM(s) Oral daily  pantoprazole  Injectable 40 milliGRAM(s) IV Push every 12 hours  sertraline 50 milliGRAM(s) Oral daily  tamsulosin 0.4 milliGRAM(s) Oral at bedtime      MEDICATIONS  (PRN):  acetaminophen     Tablet .. 650 milliGRAM(s) Oral every 6 hours PRN Temp greater or equal to 38C (100.4F), Mild Pain (1 - 3)  aluminum hydroxide/magnesium hydroxide/simethicone Suspension 30 milliLiter(s) Oral every 4 hours PRN Dyspepsia  clonazePAM  Tablet 1 milliGRAM(s) Oral four times a day PRN anxiety  melatonin 3 milliGRAM(s) Oral at bedtime PRN Insomnia  ondansetron Injectable 4 milliGRAM(s) IV Push every 8 hours PRN Nausea and/or Vomiting  oxymetazoline 0.05% Nasal Spray 2 Spray(s) Both Nostrils two times a day PRN Nasal Congestion  zolpidem 5 milliGRAM(s) Oral at bedtime PRN Insomnia  zolpidem 5 milliGRAM(s) Oral at bedtime PRN Insomnia      PHYSICAL EXAM: SEE BELOW                          8.2    7.02  )-----------( 165      ( 02 Sep 2022 05:47 )             23.7           144  |  115<H>  |  16  ----------------------------<  107<H>  3.7   |  24  |  0.93    Ca    8.4<L>      02 Sep 2022 05:47  Phos  2.6       Mg     1.9         TPro  4.8<L>  /  Alb  2.6<L>  /  TBili  0.2  /  DBili  x   /  AST  11<L>  /  ALT  16  /  AlkPhos  34<L>              Urinalysis Basic - ( 02 Sep 2022 00:00 )    Color: Pale Yellow / Appearance: Clear / S.005 / pH: x  Gluc: x / Ketone: Negative  / Bili: Negative / Urobili: Negative   Blood: x / Protein: Negative / Nitrite: Negative   Leuk Esterase: Negative / RBC: x / WBC x   Sq Epi: x / Non Sq Epi: x / Bacteria: x

## 2022-09-02 NOTE — PROGRESS NOTE ADULT - RESPIRATORY
clear to auscultation bilaterally/no use of accessory muscles/good air movement/respirations non-labored
clear to auscultation bilaterally/no wheezes/no respiratory distress/breath sounds equal/good air movement

## 2022-09-02 NOTE — PROGRESS NOTE ADULT - TIME BILLING
71M PMH PTSD, anxiety, CVA, DU, chronic back pain, HTN, presented with syncope     Found to have UGIB with acute blood loss anemia   Hypotensive but did not require pressor    EGD 8/31 showed a DU with active bleeding, s/p cautery   S/p 3 PRBCs     Now clinically stable  Change PPI to q12   Tolerating regular diet   Avoid AC, AP     OOB     Stable for floor 71M PMH PTSD, anxiety, CVA, DU, chronic back pain, HTN, presented with syncope     Found to have UGIB with acute blood loss anemia   Hypotensive but did not require pressor    EGD 8/31 showed a DU with active bleeding, s/p cautery   S/p 3 PRBCs     Now clinically stable  Change PPI to q12   Tolerating regular diet   Avoid AC, AP     OOB     Stable for floor, sign out given to Dr. Navarro at 1400

## 2022-09-02 NOTE — PROGRESS NOTE ADULT - ASSESSMENT
71 year old man with multiple medical problems who presents after an episode of loss of consciousness.  An EEG report from 2014 states that he had right hemisphere slowing and sharp waves.  On 8/31 rapid response was called due to decreased responsiveness. He was found to have a GI bleed, was transfused and taken to EGD where he was found to have a duodenal ulcer.    Patient is feeling better since transfusion  He states that he had seizures in the past following his stroke (stroke may have been hemorrhagic; does not recall if he was ever on aspirin).  He states that he takes clonazepam 2 mg BID for seizure prevention with last seizure ~ 2 years ago.    -Will hold off on additional anticonvulsants at this time; he is on clonazepam  -Obtain more information regarding old stroke if possible- hemorrhagic vs ischemic. Patient is fairly certain that it was hemorrhagic.  -Monitor CBC    Dr. Dorsey will cover neurology starting 9/3.  Please call back if needed.

## 2022-09-02 NOTE — PROGRESS NOTE ADULT - SUBJECTIVE AND OBJECTIVE BOX
Interval History:  22: No new complaints    MEDICATIONS  (STANDING):  artificial  tears Solution 1 Drop(s) Both EYES two times a day  lidocaine   4% Patch 1 Patch Transdermal every 24 hours  multivitamin/minerals 1 Tablet(s) Oral daily  OLANZapine 5 milliGRAM(s) Oral daily  pantoprazole Infusion 8 mG/Hr (10 mL/Hr) IV Continuous <Continuous>  sertraline 50 milliGRAM(s) Oral daily  tamsulosin 0.4 milliGRAM(s) Oral at bedtime    MEDICATIONS  (PRN):  acetaminophen     Tablet .. 650 milliGRAM(s) Oral every 6 hours PRN Temp greater or equal to 38C (100.4F), Mild Pain (1 - 3)  aluminum hydroxide/magnesium hydroxide/simethicone Suspension 30 milliLiter(s) Oral every 4 hours PRN Dyspepsia  clonazePAM  Tablet 1 milliGRAM(s) Oral four times a day PRN anxiety  melatonin 3 milliGRAM(s) Oral at bedtime PRN Insomnia  ondansetron Injectable 4 milliGRAM(s) IV Push every 8 hours PRN Nausea and/or Vomiting  oxymetazoline 0.05% Nasal Spray 2 Spray(s) Both Nostrils two times a day PRN Nasal Congestion  zolpidem 5 milliGRAM(s) Oral at bedtime PRN Insomnia  zolpidem 5 milliGRAM(s) Oral at bedtime PRN Insomnia      Allergies    Allergy Status Unknown    Intolerances        PHYSICAL EXAM:  Vital Signs Last 24 Hrs  T(F): 99 (22 @ 06:00)  HR: 78 (22 @ 08:00)  BP: 123/65 (22 @ 08:00)  RR: 25 (22 @ 08:00)    GENERAL: NAD, well-groomed, well-developed  HEAD:  Atraumatic, Normocephalic  Neuro:  Awake, alert, no aphasia  CN: PERRL, EOMI, no nystagmus, no facial weakness, tongue protrudes in the midline  motor: normal tone, no pronator drift, full strength in all four extremities  sensory: intact to light touch  coordination: finger to nose intact bilaterally    LABS:                        8.2    7.02  )-----------( 165      ( 02 Sep 2022 05:47 )             23.7         144  |  115<H>  |  16  ----------------------------<  107<H>  3.7   |  24  |  0.93    Ca    8.4<L>      02 Sep 2022 05:47  Phos  2.6       Mg     1.9         TPro  4.8<L>  /  Alb  2.6<L>  /  TBili  0.2  /  DBili  x   /  AST  11<L>  /  ALT  16  /  AlkPhos  34<L>        Urinalysis Basic - ( 02 Sep 2022 00:00 )    Color: Pale Yellow / Appearance: Clear / S.005 / pH: x  Gluc: x / Ketone: Negative  / Bili: Negative / Urobili: Negative   Blood: x / Protein: Negative / Nitrite: Negative   Leuk Esterase: Negative / RBC: x / WBC x   Sq Epi: x / Non Sq Epi: x / Bacteria: x        RADIOLOGY & ADDITIONAL STUDIES:  CT head 22:  Stable exam. No acute intracranial hemorrhage, vasogenic edema,   extra-axial collection or hydrocephalus.    EEG 22: Mild generalized slowing

## 2022-09-03 LAB
ANION GAP SERPL CALC-SCNC: 5 MMOL/L — SIGNIFICANT CHANGE UP (ref 5–17)
BUN SERPL-MCNC: 12 MG/DL — SIGNIFICANT CHANGE UP (ref 7–23)
CALCIUM SERPL-MCNC: 8.7 MG/DL — SIGNIFICANT CHANGE UP (ref 8.5–10.1)
CHLORIDE SERPL-SCNC: 111 MMOL/L — HIGH (ref 96–108)
CO2 SERPL-SCNC: 27 MMOL/L — SIGNIFICANT CHANGE UP (ref 22–31)
CREAT SERPL-MCNC: 0.99 MG/DL — SIGNIFICANT CHANGE UP (ref 0.5–1.3)
EGFR: 82 ML/MIN/1.73M2 — SIGNIFICANT CHANGE UP
GLUCOSE SERPL-MCNC: 103 MG/DL — HIGH (ref 70–99)
HCT VFR BLD CALC: 23.3 % — LOW (ref 39–50)
HCT VFR BLD CALC: 25.1 % — LOW (ref 39–50)
HGB BLD-MCNC: 8 G/DL — LOW (ref 13–17)
HGB BLD-MCNC: 8.6 G/DL — LOW (ref 13–17)
MCHC RBC-ENTMCNC: 30.6 PG — SIGNIFICANT CHANGE UP (ref 27–34)
MCHC RBC-ENTMCNC: 34.3 GM/DL — SIGNIFICANT CHANGE UP (ref 32–36)
MCV RBC AUTO: 89.3 FL — SIGNIFICANT CHANGE UP (ref 80–100)
PLATELET # BLD AUTO: 205 K/UL — SIGNIFICANT CHANGE UP (ref 150–400)
POTASSIUM SERPL-MCNC: 3.4 MMOL/L — LOW (ref 3.5–5.3)
POTASSIUM SERPL-SCNC: 3.4 MMOL/L — LOW (ref 3.5–5.3)
RBC # BLD: 2.81 M/UL — LOW (ref 4.2–5.8)
RBC # FLD: 13.5 % — SIGNIFICANT CHANGE UP (ref 10.3–14.5)
SODIUM SERPL-SCNC: 143 MMOL/L — SIGNIFICANT CHANGE UP (ref 135–145)
WBC # BLD: 6 K/UL — SIGNIFICANT CHANGE UP (ref 3.8–10.5)
WBC # FLD AUTO: 6 K/UL — SIGNIFICANT CHANGE UP (ref 3.8–10.5)

## 2022-09-03 PROCEDURE — 99232 SBSQ HOSP IP/OBS MODERATE 35: CPT

## 2022-09-03 RX ORDER — POTASSIUM CHLORIDE 20 MEQ
40 PACKET (EA) ORAL ONCE
Refills: 0 | Status: COMPLETED | OUTPATIENT
Start: 2022-09-03 | End: 2022-09-03

## 2022-09-03 RX ADMIN — Medication 1 MILLIGRAM(S): at 22:24

## 2022-09-03 RX ADMIN — CHLORHEXIDINE GLUCONATE 1 APPLICATION(S): 213 SOLUTION TOPICAL at 13:04

## 2022-09-03 RX ADMIN — ZOLPIDEM TARTRATE 5 MILLIGRAM(S): 10 TABLET ORAL at 22:24

## 2022-09-03 RX ADMIN — PANTOPRAZOLE SODIUM 40 MILLIGRAM(S): 20 TABLET, DELAYED RELEASE ORAL at 22:25

## 2022-09-03 RX ADMIN — Medication 1 DROP(S): at 10:55

## 2022-09-03 RX ADMIN — SERTRALINE 50 MILLIGRAM(S): 25 TABLET, FILM COATED ORAL at 10:54

## 2022-09-03 RX ADMIN — OLANZAPINE 5 MILLIGRAM(S): 15 TABLET, FILM COATED ORAL at 10:54

## 2022-09-03 RX ADMIN — OXYMETAZOLINE HYDROCHLORIDE 2 SPRAY(S): 0.5 SPRAY NASAL at 09:13

## 2022-09-03 RX ADMIN — TAMSULOSIN HYDROCHLORIDE 0.4 MILLIGRAM(S): 0.4 CAPSULE ORAL at 22:23

## 2022-09-03 RX ADMIN — Medication 1 MILLIGRAM(S): at 10:58

## 2022-09-03 RX ADMIN — Medication 40 MILLIEQUIVALENT(S): at 12:17

## 2022-09-03 RX ADMIN — OXYMETAZOLINE HYDROCHLORIDE 2 SPRAY(S): 0.5 SPRAY NASAL at 17:06

## 2022-09-03 RX ADMIN — PANTOPRAZOLE SODIUM 40 MILLIGRAM(S): 20 TABLET, DELAYED RELEASE ORAL at 10:55

## 2022-09-03 RX ADMIN — Medication 1 MILLIGRAM(S): at 17:04

## 2022-09-03 RX ADMIN — Medication 1 DROP(S): at 22:25

## 2022-09-03 NOTE — PROGRESS NOTE ADULT - NUTRITIONAL ASSESSMENT
This patient has been assessed with a concern for Malnutrition and has been determined to have a diagnosis/diagnoses of Severe protein-calorie malnutrition.    This patient is being managed with:   Diet Regular-  Entered: Sep  1 2022  8:35PM

## 2022-09-03 NOTE — PROGRESS NOTE ADULT - ASSESSMENT
(From H&P) 71 year old male w hx anxiety/PTSD w service dog, Hx CVA, hx duodenal ulcer w gastritis 2018, chronic back pain, HTN who came to ED by EMS for syncope. Patient reported that he went to a barbecue earlier in the day w friends. When he got home he did not feel that well. Took rancho seltzer x 2. Was petting his service dog and that is the last he remembers. He now lives in a basement apt and his landlord came down to check on him, and then called 911. Patient passed out and woke up covered in feces. Denied fall and denied injury. Patient had no complaint of pain, no nausea or vomiting or abd pain. Ambulance call report is not available for review. In ED /80      RR 18   T 98.6   99% RA. Stool described as blk and G+ by ED MD. CXR no acute infiltrate. EKG NSR. NS 1000 cc. CT scan pending. AIde was still cleaning up pt who had brown stool on his legs.    Admitted to hospital medicine service for further management.    Interval Events: RRT @1430: Patient Minimally responsive. RRT called. Noted with large melanotic BM, hypotensive SBP 60-80s. IVF initiated. Consent for blood obtained by Daughter Sol over phone (patient unable to give consent at this time) 2U PRBCs ordered. Transferred to ICU. Case d/w GI Dr. Del Rio. Updated daughter Sol (HCP) about clinical change/ clinical course.    Duodenal Upper GI Bleeding.   Acute Symptomatic Anemia. and hemorrhagic shock requiring pRBCs x 3 but no pressor requirement.   Had taken rancho seltzer since he did not feel well after barbecue (contains asa and takes Doans pills that contain methyl salicylate , an NSAID. CT ab/pelvis prelim reading w/ thickened bladder from prostate enlargement, no acute pathology. Known Duodenal Ulcer from 2018.  - See Interval events above  - IVF, PRBCs, PPI  - Case d/w GI  -s/p Transfer and subsequent downgrade from ICU (9/2).  -Hgb now stable. Continue to monitor  -Iron/Folate/B12 levels  s  Syncope  Hx of Seizure Disroder  likely from acute blood loss.   - Continue to monitor on tele, no events thus far - SR-ST  - Troponin negative x2  -  Neuro recs  - Work up remarkable.        Anxiety. PTSD  Chronic and stable  - Continue Clonazepam, Olanzapine, Sertraline     DVT ppx  - Continue SCDs given GI bleeding    If hgb remains stable, tentative discharge tomorrow. PT evaluation to gauge home safety being along and caring for self.

## 2022-09-03 NOTE — DIETITIAN INITIAL EVALUATION ADULT - ORAL INTAKE PTA/DIET HISTORY
Fair to poor po intake x 2 weeks PTA as per patient. C/O acid reflux. 
[FreeTextEntry1] : Data review\par Echocardiogram July 24, 2020\par Normal left ventricle\par Normal systolic function\par Normal diastolic dysfunction\par Normal RV\par No agitated saline bubbles seen in left heart\par No reported valvular heart disease\par \par Carotid duplex bilateral\par No evidence for thickening and there was minimal plaque within the left bulb\par Bilateral internal carotid and external carotid arteries patent with no stenosis\par Brain MRI\par Areas of restricted diffusion at the left lentiform nucleus and insular cortex to indicate acute infarct\par Blood work reviewed and enclosed in chart\par

## 2022-09-03 NOTE — DIETITIAN NUTRITION RISK NOTIFICATION - ADDITIONAL COMMENTS/DIETITIAN RECOMMENDATIONS
Additional Recommendations  1) Continue with current regular diet and encourage protein enriched foods with all meals. 2) MVI w/ minerals daily to meet RDI's 3) Monitor BM if none x > 3 days initiate bowel meds 4) Monitor lytes and hydration replete prn   **Will continue to monitor and follow up prn**

## 2022-09-03 NOTE — DIETITIAN INITIAL EVALUATION ADULT - PERTINENT MEDS FT
MEDICATIONS  (STANDING):  artificial  tears Solution 1 Drop(s) Both EYES two times a day  chlorhexidine 4% Liquid 1 Application(s) Topical daily  influenza  Vaccine (HIGH DOSE) 0.7 milliLiter(s) IntraMuscular once  lidocaine   4% Patch 1 Patch Transdermal every 24 hours  multivitamin/minerals 1 Tablet(s) Oral daily  OLANZapine 5 milliGRAM(s) Oral daily  pantoprazole  Injectable 40 milliGRAM(s) IV Push every 12 hours  sertraline 50 milliGRAM(s) Oral daily  tamsulosin 0.4 milliGRAM(s) Oral at bedtime    MEDICATIONS  (PRN):  acetaminophen     Tablet .. 650 milliGRAM(s) Oral every 6 hours PRN Temp greater or equal to 38C (100.4F), Mild Pain (1 - 3)  aluminum hydroxide/magnesium hydroxide/simethicone Suspension 30 milliLiter(s) Oral every 4 hours PRN Dyspepsia  clonazePAM  Tablet 1 milliGRAM(s) Oral four times a day PRN anxiety  melatonin 3 milliGRAM(s) Oral at bedtime PRN Insomnia  ondansetron Injectable 4 milliGRAM(s) IV Push every 8 hours PRN Nausea and/or Vomiting  oxymetazoline 0.05% Nasal Spray 2 Spray(s) Both Nostrils two times a day PRN Nasal Congestion  zolpidem 5 milliGRAM(s) Oral at bedtime PRN Insomnia  zolpidem 5 milliGRAM(s) Oral at bedtime PRN Insomnia

## 2022-09-03 NOTE — PROGRESS NOTE ADULT - SUBJECTIVE AND OBJECTIVE BOX
CHIEF COMPLAINT: GIB    SUBJECTIVE: Feeling well. No overt signs of bleeding. tolerating diet. Hgb stable.     SIGNIFICANT INTERVAL EVENTS/OVERNIGHT EVENTS:  -downgraded from ICU (9/2) not requiring pressors but s/p 3 units of pRBCs since admission    Review of Systems: 14 Point review of systems reviewed and reported as negative unless otherwise stated in HPI    FROM H&P:  "67y M hx of alcohol abuse (denies drinking), anemia never required blood transfusion, prior hemorrhagic stroke here with generalized weakness, fatigue, exertional syncope. Pt states 4-5 days ago walked into grocery store from parking lot and passed out in threshold. States did not hit head. States drank some fluids, declines EMS transport and went about his shopping, When he got home he walked back and forth to house w 4 loads of groceries and afterward "passed out" again on his bed, no head strike. States in last several days has felt generally weak, fatigue, dyspnea w minimal exertion. States he intermittently takes ASA "when he has a heart pain." States he has never had an EGD or colonoscopy."    PHYSICAL EXAM:    T(C): 37.2 (09-03-22 @ 15:38), Max: 37.9 (09-02-22 @ 22:54)  HR: 87 (09-03-22 @ 15:38) (80 - 87)  BP: 127/66 (09-03-22 @ 15:38) (124/57 - 133/68)  RR: 16 (09-03-22 @ 15:38) (16 - 18)  SpO2: 97% (09-03-22 @ 15:38) (96% - 97%)    General: AAOx3; NAD  Head: AT/NC  ENT: Moist Mucous Membranes; No Injury  Eyes: EOMI; PERRL  Neck: Non-tender; No JVD  CVS: RRR, S1&S2, No murmur, No edema  Respiratory: Lungs CTA B/L; Normal Respiratory Effort  Abdomen/GI: Soft, non-tender, non-distended, no guarding, no rebound, normal bowel sounds  : No bladder distention, No Gann  Extremities: No cyanosis, No clubbing, No edema  MSK: No CVA tenderness, Normal ROM, No injury  Neuro: AAOx3, CNII-XII grossly intact, non-focal  Psych: Appropriate, Cooperative, No depression, No anxiety  Skin: Clean, Dry and Intact      LABS:                          8.6    6.00  )-----------( 205      ( 03 Sep 2022 09:03 )             25.1     09-03    143  |  111<H>  |  12  ----------------------------<  103<H>  3.4<L>   |  27  |  0.99    Ca    8.7      03 Sep 2022 09:03  Phos  2.6     09-02  Mg     1.9     09-02      COVID-19 PCR: NotDetec (31 Aug 2022 03:09)    CAPILLARY BLOOD GLUCOSE    Hemoglobin: 8.6 g/dL (09.03.22 @ 09:03)   Hemoglobin: 8.2 g/dL (09.02.22 @ 05:47)   Hemoglobin: 8.9 g/dL (09.01.22 @ 11:33)   Hemoglobin: 8.7 g/dL (09.01.22 @ 05:32)   Hemoglobin: 7.5 g/dL (08.31.22 @ 20:33)   Hemoglobin: 6.6: Urinalysis (09.02.22 @ 00:00)   Glucose Qualitative, Urine: Negative   Blood, Urine: Negative   pH Urine: 6.0   Color: Pale Yellow   Urine Appearance: Clear   Bilirubin: Negative   Ketone - Urine: Negative   Specific Gravity: 1.005   Protein, Urine: Negative   Urobilinogen: Negative   Nitrite: Negative   Leukocyte Esterase Concentration: Negative Lactate, Blood: 3.3 mmol/L (08.31.22 @ 14:37)           RADIOLOGY:  < from: CT Abdomen and Pelvis w/ IV Cont (08.31.22 @ 05:53) >  IMPRESSION:    No CT evidence for active gastrointestinal bleeding.    Thick-walled trabeculated bladder, may be secondary to chronic outlet   obstruction.    Other findings as discussed above.    This study was preliminary reported by the ED radiologist on 8/31/2022.    < end of copied text >  < from: CT Head No Cont (08.31.22 @ 05:38) >  IMPRESSION:  Stable exam. No acute intracranial hemorrhage, vasogenic edema,   extra-axial collection or hydrocephalus.    < end of copied text >      EKG:  < from: 12 Lead ECG (09.01.22 @ 07:37) >  Diagnosis Line Normal sinus rhythm  Nonspecific T wave abnormality  Abnormal ECG  When compared with ECG of 31-AUG-2022 03:24,  ST no longer depressed in Anterior leads    < end of copied text >      ECHO:  < from: TTE Echo Complete w/o Contrast w/ Doppler (08.31.22 @ 11:57) >   The left ventricle is normal in size, wall thickness, wall motion appears   hyperdynamic.   Estimated left ventricular ejection fraction is 70 %.   Mild aortic sclerosis is present with normal valvular opening.   The mitral valve leaflets appear thickened.   Mild (1+) mitral regurgitation is present.   EA reversal of the mitral inflow consistent with reduced compliance of   the   left ventricle.   Normal appearing tricuspid valve structure and function.   Mild (1+) tricuspid valve regurgitation is present.    < end of copied text >            I personally reviewed labs, imaging, ekg, orders and vitals.    Discussed case with:  [X]RN  [X]CLARIBEL/ROSITA  [X]Patient  [X]Family Beth  []Specialist:        MEDICATIONS  (STANDING):  artificial  tears Solution 1 Drop(s) Both EYES two times a day  chlorhexidine 4% Liquid 1 Application(s) Topical daily  influenza  Vaccine (HIGH DOSE) 0.7 milliLiter(s) IntraMuscular once  lidocaine   4% Patch 1 Patch Transdermal every 24 hours  multivitamin/minerals 1 Tablet(s) Oral daily  OLANZapine 5 milliGRAM(s) Oral daily  pantoprazole  Injectable 40 milliGRAM(s) IV Push every 12 hours  sertraline 50 milliGRAM(s) Oral daily  tamsulosin 0.4 milliGRAM(s) Oral at bedtime    MEDICATIONS  (PRN):  acetaminophen     Tablet .. 650 milliGRAM(s) Oral every 6 hours PRN Temp greater or equal to 38C (100.4F), Mild Pain (1 - 3)  aluminum hydroxide/magnesium hydroxide/simethicone Suspension 30 milliLiter(s) Oral every 4 hours PRN Dyspepsia  clonazePAM  Tablet 1 milliGRAM(s) Oral four times a day PRN anxiety  melatonin 3 milliGRAM(s) Oral at bedtime PRN Insomnia  ondansetron Injectable 4 milliGRAM(s) IV Push every 8 hours PRN Nausea and/or Vomiting  oxymetazoline 0.05% Nasal Spray 2 Spray(s) Both Nostrils two times a day PRN Nasal Congestion  zolpidem 5 milliGRAM(s) Oral at bedtime PRN Insomnia  zolpidem 5 milliGRAM(s) Oral at bedtime PRN Insomnia

## 2022-09-03 NOTE — DIETITIAN INITIAL EVALUATION ADULT - ADD RECOMMEND
1) Continue with current regular diet and encourage protein enriched foods with all meals. 2) MVI w/ minerals daily to meet RDI's 3) Monitor BM if none x > 3 days initiate bowel meds 4) Monitor lytes and hydration replete prn   **Will continue to monitor and follow up prn**

## 2022-09-03 NOTE — DIETITIAN INITIAL EVALUATION ADULT - PERTINENT LABORATORY DATA
09-03    143  |  111<H>  |  12  ----------------------------<  103<H>  3.4<L>   |  27  |  0.99    Ca    8.7      03 Sep 2022 09:03  Phos  2.6     09-02  Mg     1.9     09-02

## 2022-09-03 NOTE — DIETITIAN INITIAL EVALUATION ADULT - OTHER INFO
71M PMH PTSD, anxiety, CVA, DU, chronic back pain, HTN, presented with syncope. Found to have UGIB with acute blood loss anemia. Hypotensive but did not require pressor. EGD 8/31 showed a DU with active bleeding, s/p cautery. S/p 3 PRBCs Change PPI to q12 Tolerating regular diet w/ ~50% intake noted.   Pt stated po intake poor x 2 weeks PTA and # Current bed scale weight taken by RD -180# indicating a significant loss of 15#/7.7%. NFPE- moderate/mild muscle/fat wasting. See below for recommendations. Criteria met for severe malnutrition.

## 2022-09-03 NOTE — PROGRESS NOTE ADULT - PROVIDER SPECIALTY LIST ADULT
Critical Care
Gastroenterology
Hospitalist
Neurology
Critical Care
Gastroenterology
Hospitalist
Neurology
Critical Care

## 2022-09-03 NOTE — PROGRESS NOTE ADULT - REASON FOR ADMISSION
Syncope  GI bleed
Syncope  GI bleed
Syncope. GI bleed
Syncope  GI bleed

## 2022-09-04 ENCOUNTER — TRANSCRIPTION ENCOUNTER (OUTPATIENT)
Age: 72
End: 2022-09-04

## 2022-09-04 VITALS
TEMPERATURE: 99 F | OXYGEN SATURATION: 96 % | HEART RATE: 75 BPM | RESPIRATION RATE: 16 BRPM | SYSTOLIC BLOOD PRESSURE: 111 MMHG | DIASTOLIC BLOOD PRESSURE: 49 MMHG

## 2022-09-04 LAB
ANION GAP SERPL CALC-SCNC: 5 MMOL/L — SIGNIFICANT CHANGE UP (ref 5–17)
BUN SERPL-MCNC: 10 MG/DL — SIGNIFICANT CHANGE UP (ref 7–23)
CALCIUM SERPL-MCNC: 8.6 MG/DL — SIGNIFICANT CHANGE UP (ref 8.5–10.1)
CHLORIDE SERPL-SCNC: 109 MMOL/L — HIGH (ref 96–108)
CO2 SERPL-SCNC: 27 MMOL/L — SIGNIFICANT CHANGE UP (ref 22–31)
CREAT SERPL-MCNC: 0.89 MG/DL — SIGNIFICANT CHANGE UP (ref 0.5–1.3)
EGFR: 93 ML/MIN/1.73M2 — SIGNIFICANT CHANGE UP
FERRITIN SERPL-MCNC: 54 NG/ML — SIGNIFICANT CHANGE UP (ref 30–400)
FOLATE SERPL-MCNC: 11.7 NG/ML — SIGNIFICANT CHANGE UP
GLUCOSE SERPL-MCNC: 101 MG/DL — HIGH (ref 70–99)
HCT VFR BLD CALC: 23.8 % — LOW (ref 39–50)
HGB BLD-MCNC: 8.4 G/DL — LOW (ref 13–17)
IRON SATN MFR SERPL: 11 % — LOW (ref 16–55)
IRON SATN MFR SERPL: 25 UG/DL — LOW (ref 45–165)
MAGNESIUM SERPL-MCNC: 1.8 MG/DL — SIGNIFICANT CHANGE UP (ref 1.6–2.6)
MCHC RBC-ENTMCNC: 31.7 PG — SIGNIFICANT CHANGE UP (ref 27–34)
MCHC RBC-ENTMCNC: 35.3 GM/DL — SIGNIFICANT CHANGE UP (ref 32–36)
MCV RBC AUTO: 89.8 FL — SIGNIFICANT CHANGE UP (ref 80–100)
PLATELET # BLD AUTO: 208 K/UL — SIGNIFICANT CHANGE UP (ref 150–400)
POTASSIUM SERPL-MCNC: 3.6 MMOL/L — SIGNIFICANT CHANGE UP (ref 3.5–5.3)
POTASSIUM SERPL-SCNC: 3.6 MMOL/L — SIGNIFICANT CHANGE UP (ref 3.5–5.3)
RBC # BLD: 2.65 M/UL — LOW (ref 4.2–5.8)
RBC # FLD: 13.6 % — SIGNIFICANT CHANGE UP (ref 10.3–14.5)
SODIUM SERPL-SCNC: 141 MMOL/L — SIGNIFICANT CHANGE UP (ref 135–145)
TIBC SERPL-MCNC: 229 UG/DL — SIGNIFICANT CHANGE UP (ref 220–430)
UIBC SERPL-MCNC: 204 UG/DL — SIGNIFICANT CHANGE UP (ref 110–370)
VIT B12 SERPL-MCNC: 440 PG/ML — SIGNIFICANT CHANGE UP (ref 232–1245)
WBC # BLD: 5.99 K/UL — SIGNIFICANT CHANGE UP (ref 3.8–10.5)
WBC # FLD AUTO: 5.99 K/UL — SIGNIFICANT CHANGE UP (ref 3.8–10.5)

## 2022-09-04 PROCEDURE — 99239 HOSP IP/OBS DSCHRG MGMT >30: CPT

## 2022-09-04 RX ORDER — PANTOPRAZOLE SODIUM 20 MG/1
40 TABLET, DELAYED RELEASE ORAL
Refills: 0 | Status: DISCONTINUED | OUTPATIENT
Start: 2022-09-04 | End: 2022-09-04

## 2022-09-04 RX ORDER — PREGABALIN 225 MG/1
1 CAPSULE ORAL
Qty: 30 | Refills: 0
Start: 2022-09-04 | End: 2022-10-03

## 2022-09-04 RX ORDER — IRON SUCROSE 20 MG/ML
200 INJECTION, SOLUTION INTRAVENOUS ONCE
Refills: 0 | Status: COMPLETED | OUTPATIENT
Start: 2022-09-04 | End: 2022-09-04

## 2022-09-04 RX ORDER — HYDROCORTISONE 1 %
1 OINTMENT (GRAM) TOPICAL
Qty: 30 | Refills: 0
Start: 2022-09-04

## 2022-09-04 RX ORDER — FERROUS SULFATE 325(65) MG
1 TABLET ORAL
Qty: 30 | Refills: 0
Start: 2022-09-04 | End: 2022-10-03

## 2022-09-04 RX ORDER — PANTOPRAZOLE SODIUM 20 MG/1
1 TABLET, DELAYED RELEASE ORAL
Qty: 60 | Refills: 0
Start: 2022-09-04 | End: 2022-10-03

## 2022-09-04 RX ORDER — PANTOPRAZOLE SODIUM 20 MG/1
1 TABLET, DELAYED RELEASE ORAL
Qty: 120 | Refills: 0
Start: 2022-09-04 | End: 2022-11-02

## 2022-09-04 RX ORDER — PREGABALIN 225 MG/1
1000 CAPSULE ORAL DAILY
Refills: 0 | Status: DISCONTINUED | OUTPATIENT
Start: 2022-09-04 | End: 2022-09-04

## 2022-09-04 RX ORDER — FERROUS SULFATE 325(65) MG
325 TABLET ORAL DAILY
Refills: 0 | Status: DISCONTINUED | OUTPATIENT
Start: 2022-09-04 | End: 2022-09-04

## 2022-09-04 RX ORDER — MAGNESIUM SALICYLATE
1 POWDER (GRAM) MISCELLANEOUS
Qty: 0 | Refills: 0 | DISCHARGE

## 2022-09-04 RX ORDER — POLYETHYLENE GLYCOL 3350 17 G/17G
17 POWDER, FOR SOLUTION ORAL
Qty: 510 | Refills: 0
Start: 2022-09-04 | End: 2022-10-03

## 2022-09-04 RX ADMIN — OXYMETAZOLINE HYDROCHLORIDE 2 SPRAY(S): 0.5 SPRAY NASAL at 08:47

## 2022-09-04 RX ADMIN — Medication 1 DROP(S): at 08:48

## 2022-09-04 RX ADMIN — IRON SUCROSE 200 MILLIGRAM(S): 20 INJECTION, SOLUTION INTRAVENOUS at 10:35

## 2022-09-04 RX ADMIN — Medication 1 MILLIGRAM(S): at 08:47

## 2022-09-04 RX ADMIN — Medication 1 MILLIGRAM(S): at 15:32

## 2022-09-04 RX ADMIN — OLANZAPINE 5 MILLIGRAM(S): 15 TABLET, FILM COATED ORAL at 08:47

## 2022-09-04 RX ADMIN — PANTOPRAZOLE SODIUM 40 MILLIGRAM(S): 20 TABLET, DELAYED RELEASE ORAL at 08:49

## 2022-09-04 RX ADMIN — Medication 1 TABLET(S): at 08:47

## 2022-09-04 RX ADMIN — OXYMETAZOLINE HYDROCHLORIDE 2 SPRAY(S): 0.5 SPRAY NASAL at 15:33

## 2022-09-04 RX ADMIN — SERTRALINE 50 MILLIGRAM(S): 25 TABLET, FILM COATED ORAL at 08:48

## 2022-09-04 RX ADMIN — CHLORHEXIDINE GLUCONATE 1 APPLICATION(S): 213 SOLUTION TOPICAL at 13:53

## 2022-09-04 NOTE — PHYSICAL THERAPY INITIAL EVALUATION ADULT - GAIT TRAINING, PT EVAL
By D/C: Pt will amb with ' with independence. By D/C: Pt will ascend/descend 10 steps with HR, step to gait pattern, and independence.

## 2022-09-04 NOTE — PHYSICAL THERAPY INITIAL EVALUATION ADULT - ADDITIONAL COMMENTS
Pt lives with family in private home, 3 JEFF with handrail, and resides on main level of home.  Pt was independent in all ADLs prior to hospitalization, and ambulated independently without a device.  Pt has 2 RW at home if needed. info obtained from eval 9/2018. Pt lives with family in private home, 3 JEFF with handrail, and resides on main level of home.  Pt was independent in all ADLs prior to hospitalization, and ambulated independently without a device.  Pt has 2 RW at home if needed. info obtained from eval 9/2018. Update 9/4: Pt states he lives in basement apt with 6 steps to enter. Pt states he has 2 RW at home but does not use them.

## 2022-09-04 NOTE — PHYSICAL THERAPY INITIAL EVALUATION ADULT - ACTIVE RANGE OF MOTION EXAMINATION, REHAB EVAL
Bilateral DF neutral/Left UE Active ROM was WFL (within functional limits)/Right UE Active ROM was WFL (within functional limits)/Left LE Active ROM was WFL (within functional limits)/Right LE Active ROM was WFL (within functional limits)

## 2022-09-04 NOTE — DISCHARGE NOTE PROVIDER - PROVIDER TOKENS
PROVIDER:[TOKEN:[[id number]],FOLLOWUP:[1 month],ESTABLISHEDPATIENT:[T]],PROVIDER:[TOKEN:[[id number]],FOLLOWUP:[2 weeks],ESTABLISHEDPATIENT:[T]],PROVIDER:[TOKEN:[[id number]],FOLLOWUP:[1 week],ESTABLISHEDPATIENT:[T]]

## 2022-09-04 NOTE — DISCHARGE NOTE PROVIDER - HOSPITAL COURSE
FROM H&P:  "71 year old male w hx anxiety PTSD w service dog, Hx CVA, hx duodenul ulcer w gastritis 2018, chronic back pain, HTN who came to ED by EMS for syncope    Pt reported that he went to a barbeque earlier in the day w friends  When he got home he did not feel that well  took rancho seltzer x 2  was petting his service dog and that is the last he remembers  He now lives in a basement apt and his landlord came down to check on him  and then called 911  patient passed out and woke up covered in feces  denied fall and denied injury  pt had no complaint of pain  no nausea or vomiting or abd pain    [facility] call report is not available for review    In ED /80      RR 18   T 98.6   99% RA  stool described as blk and G+ by ED MD  CXR no acute infiltrate  EKG NSR   NS 1000 cc  CT scan pending  AIde was still cleaning up pt who had brown stool on his legs "  Admitted to hospital medicine service for further management.    Interval Events: RRT @1430: Patient Minimally responsive. RRT called. Noted with large melanotic BM, hypotensive SBP 60-80s. IVF initiated. Consent for blood obtained by Daughter Sol over phone (patient unable to give consent at this time) 2U PRBCs ordered. Transferred to ICU. Case d/w GI Dr. Del Rio. Updated daughter Sol (HCP) about clinical change/ clinical course.    Duodenal Upper GI Bleeding.   Acute Symptomatic Anemia. and hemorrhagic shock requiring pRBCs x 3 but no pressor requirement.   Had taken rancho seltzer since he did not feel well after barbecue (contains asa and takes Doans pills that contain methyl salicylate , an NSAID. CT ab/pelvis prelim reading w/ thickened bladder from prostate enlargement, no acute pathology. Known Duodenal Ulcer from 2018.  - See Interval events above  - IVF, PRBCs, PPI  - Case d/w GI  -s/p Transfer and subsequent downgrade from ICU (9/2).  -Hgb now stable. Continue to monitor  -Iron/Folate/B12 levels  s  Syncope  Hx of Seizure Disroder  likely from acute blood loss.   - Continue to monitor on tele, no events thus far - SR-ST  - Troponin negative x2  -  Neuro recs  - Work up remarkable.        Anxiety. PTSD  Chronic and stable  - Continue Clonazepam, Olanzapine, Sertraline     DVT ppx  - Continue SCDs given GI bleeding    Hgb stable. PT evaluation -> At baseline. Continue to use RW (has one at home). Discharge home ins table condition and close outpatient follow up with PMD and GI.  T(C): 36.9 (09-04-22 @ 08:29), Max: 37.2 (09-03-22 @ 15:38)  HR: 76 (09-04-22 @ 08:29) (70 - 87)  BP: 118/58 (09-04-22 @ 08:29) (118/58 - 127/66)  RR: 18 (09-04-22 @ 08:29) (16 - 18)  SpO2: 97% (09-04-22 @ 08:29) (97% - 97%)  AAOx3; NAD  Lungs CTA bilaterally  RRR; No murmur  Abdomen benign  Discharge management: 38 minute  Date of Discharge/Service: 9/4/2022

## 2022-09-04 NOTE — DISCHARGE NOTE NURSING/CASE MANAGEMENT/SOCIAL WORK - NSDCPEFALRISK_GEN_ALL_CORE
For information on Fall & Injury Prevention, visit: [email or web address] OR  [email or web address] OR  https://www.cdc.gov/steadi/patient.html

## 2022-09-04 NOTE — DISCHARGE NOTE NURSING/CASE MANAGEMENT/SOCIAL WORK - PATIENT PORTAL LINK FT
You can access the FollowMyHealth Patient Portal offered by [facility] by registering at the following website: [email or web address]. By joining [employer]’s FollowMyHealth portal, you will also be able to view your health information using other applications (apps) compatible with our system.

## 2022-09-04 NOTE — DISCHARGE NOTE PROVIDER - CARE PROVIDERS DIRECT ADDRESSES
,DirectAddress_Unknown,[email or web address],DirectAddress_Unknown

## 2022-09-04 NOTE — DISCHARGE NOTE PROVIDER - NSDCACTIVITY_GEN_ALL_CORE
Use assistive device/rolling walker with ambulation/No restrictions/Do not drive or operate machinery

## 2022-09-04 NOTE — DISCHARGE NOTE NURSING/CASE MANAGEMENT/SOCIAL WORK - DATE OF FIRST COVID-19 BOOSTER
Labs are within normal limits  Please notify patient 
Left message for patient to call the office back. 
20-Jun-2022

## 2022-09-04 NOTE — DISCHARGE NOTE PROVIDER - NSDCMRMEDTOKEN_GEN_ALL_CORE_FT
Afrin 0.05% nasal spray: 2 spray(s) nasal 2 times a day, As Needed  Ambien 10 mg oral tablet: 1 tab(s) orally once a day (at bedtime), As Needed  Clear Eyes 0.012% ophthalmic solution: 1 drop(s) to each affected eye once a day, As Needed  clonazePAM 1 mg oral tablet: 1 tab(s) orally 4 times a day, As Needed  cyanocobalamin 1000 mcg oral tablet: 1 tab(s) orally once a day   ferrous sulfate 324 mg (65 mg elemental iron) oral delayed release tablet: 1 tab(s) orally once a day   OLANZapine 5 mg oral tablet: 1 tab(s) orally once a day  One A Day Men&#x27;s Complete oral tablet: 1 tab(s) orally once a day  pantoprazole 40 mg oral delayed release tablet: 1 tab(s) orally 2 times a day  sertraline 50 mg oral tablet: 1 tab(s) orally once a day  ***patient states that he cannot remember the name of this drug but if it was filled then he is taking it as directed***  tamsulosin 0.4 mg oral capsule: 1 cap(s) orally once a day  Tylenol 325 mg oral tablet: 2 tab(s) orally every 6 hours, As Needed

## 2022-09-04 NOTE — PHYSICAL THERAPY INITIAL EVALUATION ADULT - PLANNED THERAPY INTERVENTIONS, PT EVAL
Stair training. Eval, amb, transfers, bed mobility, stairs x 15'. Pt left in bed with all observation section equipment/material intact and bed alarm activated. Pt with no c/o pain. Will cont to follow pt and increase as tolerated./bed mobility training/gait training/transfer training

## 2022-09-04 NOTE — DISCHARGE NOTE PROVIDER - DETAILS OF MALNUTRITION DIAGNOSIS/DIAGNOSES
This patient has been assessed with a concern for Malnutrition and was treated during this hospitalization for the following Nutrition diagnosis/diagnoses:     -  09/03/2022: Severe protein-calorie malnutrition

## 2022-09-04 NOTE — DISCHARGE NOTE PROVIDER - CARE PROVIDER_API CALL
Mir Del Rio)  Gastroenterology; Internal Medicine  [address] [place] [address]  Phone: [phone]  Fax: [phone]  Established Patient  Follow Up Time: 1 month    Rachel Whatley)  Clinical Neurophysiology; EEGEpilepsy; Neurology; Sleep Medicine  [address] [place] [address], [place] [address]  Phone: [phone]  Fax: [phone]  Established Patient  Follow Up Time: 2 weeks    Wilfredo Cunningham)  Geriatric Medicine; Hematology; Internal Medicine  [address] [place] [address] [address]  [place] [address]  Phone: [phone]  Fax: [phone]  Established Patient  Follow Up Time: 1 week

## 2022-09-12 DIAGNOSIS — F43.10 POST-TRAUMATIC STRESS DISORDER, UNSPECIFIED: ICD-10-CM

## 2022-09-12 DIAGNOSIS — Z87.11 PERSONAL HISTORY OF PEPTIC ULCER DISEASE: ICD-10-CM

## 2022-09-12 DIAGNOSIS — I69.398 OTHER SEQUELAE OF CEREBRAL INFARCTION: ICD-10-CM

## 2022-09-12 DIAGNOSIS — K20.90 ESOPHAGITIS, UNSPECIFIED WITHOUT BLEEDING: ICD-10-CM

## 2022-09-12 DIAGNOSIS — F51.05 INSOMNIA DUE TO OTHER MENTAL DISORDER: ICD-10-CM

## 2022-09-12 DIAGNOSIS — Z91.81 HISTORY OF FALLING: ICD-10-CM

## 2022-09-12 DIAGNOSIS — E43 UNSPECIFIED SEVERE PROTEIN-CALORIE MALNUTRITION: ICD-10-CM

## 2022-09-12 DIAGNOSIS — D62 ACUTE POSTHEMORRHAGIC ANEMIA: ICD-10-CM

## 2022-09-12 DIAGNOSIS — R57.8 OTHER SHOCK: ICD-10-CM

## 2022-09-12 DIAGNOSIS — N40.0 BENIGN PROSTATIC HYPERPLASIA WITHOUT LOWER URINARY TRACT SYMPTOMS: ICD-10-CM

## 2022-09-12 DIAGNOSIS — F32.A DEPRESSION, UNSPECIFIED: ICD-10-CM

## 2022-09-12 DIAGNOSIS — R26.0 ATAXIC GAIT: ICD-10-CM

## 2022-09-12 DIAGNOSIS — Z59.89 OTHER PROBLEMS RELATED TO HOUSING AND ECONOMIC CIRCUMSTANCES: ICD-10-CM

## 2022-09-12 DIAGNOSIS — I10 ESSENTIAL (PRIMARY) HYPERTENSION: ICD-10-CM

## 2022-09-12 DIAGNOSIS — H54.61 UNQUALIFIED VISUAL LOSS, RIGHT EYE, NORMAL VISION LEFT EYE: ICD-10-CM

## 2022-09-12 DIAGNOSIS — Z20.822 CONTACT WITH AND (SUSPECTED) EXPOSURE TO COVID-19: ICD-10-CM

## 2022-09-12 DIAGNOSIS — K26.0 ACUTE DUODENAL ULCER WITH HEMORRHAGE: ICD-10-CM

## 2022-09-12 DIAGNOSIS — G89.29 OTHER CHRONIC PAIN: ICD-10-CM

## 2022-09-12 DIAGNOSIS — R56.9 UNSPECIFIED CONVULSIONS: ICD-10-CM

## 2022-09-12 DIAGNOSIS — M48.07 SPINAL STENOSIS, LUMBOSACRAL REGION: ICD-10-CM

## 2022-09-12 DIAGNOSIS — F41.9 ANXIETY DISORDER, UNSPECIFIED: ICD-10-CM

## 2022-09-12 DIAGNOSIS — T39.395A ADVERSE EFFECT OF OTHER NONSTEROIDAL ANTI-INFLAMMATORY DRUGS [NSAID], INITIAL ENCOUNTER: ICD-10-CM

## 2022-09-12 DIAGNOSIS — G62.9 POLYNEUROPATHY, UNSPECIFIED: ICD-10-CM

## 2022-09-12 DIAGNOSIS — K31.9 DISEASE OF STOMACH AND DUODENUM, UNSPECIFIED: ICD-10-CM

## 2022-09-12 DIAGNOSIS — Z99.89 DEPENDENCE ON OTHER ENABLING MACHINES AND DEVICES: ICD-10-CM

## 2022-09-12 SDOH — ECONOMIC STABILITY - INCOME SECURITY: OTHER PROBLEMS RELATED TO HOUSING AND ECONOMIC CIRCUMSTANCES: Z59.89

## 2023-07-24 NOTE — PATIENT PROFILE ADULT. - TOBACCO USE
1 person assist
verbal cues/nonverbal cues (demo/gestures)/1 person assist
verbal cues/1 person assist
Never smoker

## 2023-11-20 NOTE — ED PROVIDER NOTE - CADM POA PRESS ULCER
Dapsone Counseling: I discussed with the patient the risks of dapsone including but not limited to hemolytic anemia, agranulocytosis, rashes, methemoglobinemia, kidney failure, peripheral neuropathy, headaches, GI upset, and liver toxicity.  Patients who start dapsone require monitoring including baseline LFTs and weekly CBCs for the first month, then every month thereafter.  The patient verbalized understanding of the proper use and possible adverse effects of dapsone.  All of the patient's questions and concerns were addressed. Topical Sulfur Applications Counseling: Topical Sulfur Counseling: Patient counseled that this medication may cause skin irritation or allergic reactions.  In the event of skin irritation, the patient was advised to reduce the amount of the drug applied or use it less frequently.   The patient verbalized understanding of the proper use and possible adverse effects of topical sulfur application.  All of the patient's questions and concerns were addressed. Isotretinoin Pregnancy And Lactation Text: This medication is Pregnancy Category X and is considered extremely dangerous during pregnancy. It is unknown if it is excreted in breast milk. Detail Level: Detailed Tetracycline Counseling: Patient counseled regarding possible photosensitivity and increased risk for sunburn.  Patient instructed to avoid sunlight, if possible.  When exposed to sunlight, patients should wear protective clothing, sunglasses, and sunscreen.  The patient was instructed to call the office immediately if the following severe adverse effects occur:  hearing changes, easy bruising/bleeding, severe headache, or vision changes.  The patient verbalized understanding of the proper use and possible adverse effects of tetracycline.  All of the patient's questions and concerns were addressed. Patient understands to avoid pregnancy while on therapy due to potential birth defects. Benzoyl Peroxide Pregnancy And Lactation Text: This medication is Pregnancy Category C. It is unknown if benzoyl peroxide is excreted in breast milk. High Dose Vitamin A Pregnancy And Lactation Text: High dose vitamin A therapy is contraindicated during pregnancy and breast feeding. Doxycycline Counseling:  Patient counseled regarding possible photosensitivity and increased risk for sunburn.  Patient instructed to avoid sunlight, if possible.  When exposed to sunlight, patients should wear protective clothing, sunglasses, and sunscreen.  The patient was instructed to call the office immediately if the following severe adverse effects occur:  hearing changes, easy bruising/bleeding, severe headache, or vision changes.  The patient verbalized understanding of the proper use and possible adverse effects of doxycycline.  All of the patient's questions and concerns were addressed. Topical Retinoid Pregnancy And Lactation Text: This medication is Pregnancy Category C. It is unknown if this medication is excreted in breast milk. Winlevi Counseling:  I discussed with the patient the risks of topical clascoterone including but not limited to erythema, scaling, itching, and stinging. Patient voiced their understanding. Aklief Pregnancy And Lactation Text: It is unknown if this medication is safe to use during pregnancy.  It is unknown if this medication is excreted in breast milk.  Breastfeeding women should use the topical cream on the smallest area of the skin for the shortest time needed while breastfeeding.  Do not apply to nipple and areola. Winlevi Pregnancy And Lactation Text: This medication is considered safe during pregnancy and breastfeeding. Tazorac Counseling:  Patient advised that medication is irritating and drying.  Patient may need to apply sparingly and wash off after an hour before eventually leaving it on overnight.  The patient verbalized understanding of the proper use and possible adverse effects of tazorac.  All of the patient's questions and concerns were addressed. Bactrim Counseling:  I discussed with the patient the risks of sulfa antibiotics including but not limited to GI upset, allergic reaction, drug rash, diarrhea, dizziness, photosensitivity, and yeast infections.  Rarely, more serious reactions can occur including but not limited to aplastic anemia, agranulocytosis, methemoglobinemia, blood dyscrasias, liver or kidney failure, lung infiltrates or desquamative/blistering drug rashes. Sarecycline Counseling: Patient advised regarding possible photosensitivity and discoloration of the teeth, skin, lips, tongue and gums.  Patient instructed to avoid sunlight, if possible.  When exposed to sunlight, patients should wear protective clothing, sunglasses, and sunscreen.  The patient was instructed to call the office immediately if the following severe adverse effects occur:  hearing changes, easy bruising/bleeding, severe headache, or vision changes.  The patient verbalized understanding of the proper use and possible adverse effects of sarecycline.  All of the patient's questions and concerns were addressed. Birth Control Pills Counseling: Birth Control Pill Counseling: I discussed with the patient the potential side effects of OCPs including but not limited to increased risk of stroke, heart attack, thrombophlebitis, deep venous thrombosis, hepatic adenomas, breast changes, GI upset, headaches, and depression.  The patient verbalized understanding of the proper use and possible adverse effects of OCPs. All of the patient's questions and concerns were addressed. Erythromycin Pregnancy And Lactation Text: This medication is Pregnancy Category B and is considered safe during pregnancy. It is also excreted in breast milk. Topical Clindamycin Counseling: Patient counseled that this medication may cause skin irritation or allergic reactions.  In the event of skin irritation, the patient was advised to reduce the amount of the drug applied or use it less frequently.   The patient verbalized understanding of the proper use and possible adverse effects of clindamycin.  All of the patient's questions and concerns were addressed. Azelaic Acid Pregnancy And Lactation Text: This medication is considered safe during pregnancy and breast feeding. Spironolactone Counseling: Patient advised regarding risks of diarrhea, abdominal pain, hyperkalemia, birth defects (for female patients), liver toxicity and renal toxicity. The patient may need blood work to monitor liver and kidney function and potassium levels while on therapy. The patient verbalized understanding of the proper use and possible adverse effects of spironolactone.  All of the patient's questions and concerns were addressed. Benzoyl Peroxide Counseling: Patient counseled that medicine may cause skin irritation and bleach clothing.  In the event of skin irritation, the patient was advised to reduce the amount of the drug applied or use it less frequently.   The patient verbalized understanding of the proper use and possible adverse effects of benzoyl peroxide.  All of the patient's questions and concerns were addressed. High Dose Vitamin A Counseling: Side effects reviewed, pt to contact office should one occur. Spironolactone Pregnancy And Lactation Text: This medication can cause feminization of the male fetus and should be avoided during pregnancy. The active metabolite is also found in breast milk. Azithromycin Counseling:  I discussed with the patient the risks of azithromycin including but not limited to GI upset, allergic reaction, drug rash, diarrhea, and yeast infections. Topical Retinoid counseling:  Patient advised to apply a pea-sized amount only at bedtime and wait 30 minutes after washing their face before applying.  If too drying, patient may add a non-comedogenic moisturizer. The patient verbalized understanding of the proper use and possible adverse effects of retinoids.  All of the patient's questions and concerns were addressed. No Topical Sulfur Applications Pregnancy And Lactation Text: This medication is Pregnancy Category C and has an unknown safety profile during pregnancy. It is unknown if this topical medication is excreted in breast milk. Dapsone Pregnancy And Lactation Text: This medication is Pregnancy Category C and is not considered safe during pregnancy or breast feeding. Tetracycline Pregnancy And Lactation Text: This medication is Pregnancy Category D and not consider safe during pregnancy. It is also excreted in breast milk. Use Enhanced Medication Counseling?: No Doxycycline Pregnancy And Lactation Text: This medication is Pregnancy Category D and not consider safe during pregnancy. It is also excreted in breast milk but is considered safe for shorter treatment courses. Minocycline Counseling: Patient advised regarding possible photosensitivity and discoloration of the teeth, skin, lips, tongue and gums.  Patient instructed to avoid sunlight, if possible.  When exposed to sunlight, patients should wear protective clothing, sunglasses, and sunscreen.  The patient was instructed to call the office immediately if the following severe adverse effects occur:  hearing changes, easy bruising/bleeding, severe headache, or vision changes.  The patient verbalized understanding of the proper use and possible adverse effects of minocycline.  All of the patient's questions and concerns were addressed. Erythromycin Counseling:  I discussed with the patient the risks of erythromycin including but not limited to GI upset, allergic reaction, drug rash, diarrhea, increase in liver enzymes, and yeast infections. Aklief counseling:  Patient advised to apply a pea-sized amount only at bedtime and wait 30 minutes after washing their face before applying.  If too drying, patient may add a non-comedogenic moisturizer.  The most commonly reported side effects including irritation, redness, scaling, dryness, stinging, burning, itching, and increased risk of sunburn.  The patient verbalized understanding of the proper use and possible adverse effects of retinoids.  All of the patient's questions and concerns were addressed. Azithromycin Pregnancy And Lactation Text: This medication is considered safe during pregnancy and is also secreted in breast milk. Tazorac Pregnancy And Lactation Text: This medication is not safe during pregnancy. It is unknown if this medication is excreted in breast milk. Bactrim Pregnancy And Lactation Text: This medication is Pregnancy Category D and is known to cause fetal risk.  It is also excreted in breast milk. Azelaic Acid Counseling: Patient counseled that medicine may cause skin irritation and to avoid applying near the eyes.  In the event of skin irritation, the patient was advised to reduce the amount of the drug applied or use it less frequently.   The patient verbalized understanding of the proper use and possible adverse effects of azelaic acid.  All of the patient's questions and concerns were addressed. Isotretinoin Counseling: Patient should get monthly blood tests, not donate blood, not drive at night if vision affected, not share medication, and not undergo elective surgery for 6 months after tx completed. Side effects reviewed, pt to contact office should one occur. Birth Control Pills Pregnancy And Lactation Text: This medication should be avoided if pregnant and for the first 30 days post-partum. Topical Clindamycin Pregnancy And Lactation Text: This medication is Pregnancy Category B and is considered safe during pregnancy. It is unknown if it is excreted in breast milk.

## 2024-03-03 NOTE — PATIENT PROFILE ADULT - HAVE YOU HAD A SECOND COVID-19 BOOSTER?
Initial PT eval completed. Patient SBA with bed mobility and SBA with gait 75 feet x2 with RW. Recommend low intensity therapy.    Yes No

## 2024-04-02 ENCOUNTER — INPATIENT (INPATIENT)
Facility: HOSPITAL | Age: 74
LOS: 3 days | Discharge: HOME | DRG: 378 | End: 2024-04-06
Attending: INTERNAL MEDICINE | Admitting: INTERNAL MEDICINE
Payer: MEDICARE

## 2024-04-02 VITALS
HEIGHT: 71 IN | DIASTOLIC BLOOD PRESSURE: 95 MMHG | HEART RATE: 95 BPM | SYSTOLIC BLOOD PRESSURE: 164 MMHG | RESPIRATION RATE: 20 BRPM | OXYGEN SATURATION: 98 % | WEIGHT: 195.11 LBS | TEMPERATURE: 98 F

## 2024-04-02 DIAGNOSIS — K92.1 MELENA: ICD-10-CM

## 2024-04-02 LAB
ALBUMIN SERPL ELPH-MCNC: 3 G/DL — LOW (ref 3.3–5)
ALP SERPL-CCNC: 131 U/L — HIGH (ref 40–120)
ALT FLD-CCNC: 82 U/L — HIGH (ref 12–78)
ANION GAP SERPL CALC-SCNC: 5 MMOL/L — SIGNIFICANT CHANGE UP (ref 5–17)
ANISOCYTOSIS BLD QL: SLIGHT — SIGNIFICANT CHANGE UP
APPEARANCE UR: CLEAR — SIGNIFICANT CHANGE UP
APTT BLD: 33.5 SEC — SIGNIFICANT CHANGE UP (ref 24.5–35.6)
AST SERPL-CCNC: 60 U/L — HIGH (ref 15–37)
BASOPHILS # BLD AUTO: 0 K/UL — SIGNIFICANT CHANGE UP (ref 0–0.2)
BASOPHILS NFR BLD AUTO: 0 % — SIGNIFICANT CHANGE UP (ref 0–2)
BILIRUB SERPL-MCNC: 0.3 MG/DL — SIGNIFICANT CHANGE UP (ref 0.2–1.2)
BILIRUB UR-MCNC: NEGATIVE — SIGNIFICANT CHANGE UP
BLD GP AB SCN SERPL QL: SIGNIFICANT CHANGE UP
BUN SERPL-MCNC: 13 MG/DL — SIGNIFICANT CHANGE UP (ref 7–23)
CALCIUM SERPL-MCNC: 9.4 MG/DL — SIGNIFICANT CHANGE UP (ref 8.5–10.1)
CHLORIDE SERPL-SCNC: 94 MMOL/L — LOW (ref 96–108)
CK SERPL-CCNC: 38 U/L — SIGNIFICANT CHANGE UP (ref 26–308)
CO2 SERPL-SCNC: 27 MMOL/L — SIGNIFICANT CHANGE UP (ref 22–31)
COLOR SPEC: YELLOW — SIGNIFICANT CHANGE UP
CREAT SERPL-MCNC: 1.09 MG/DL — SIGNIFICANT CHANGE UP (ref 0.5–1.3)
DIFF PNL FLD: NEGATIVE — SIGNIFICANT CHANGE UP
EGFR: 73 ML/MIN/1.73M2 — SIGNIFICANT CHANGE UP
EOSINOPHIL # BLD AUTO: 0.17 K/UL — SIGNIFICANT CHANGE UP (ref 0–0.5)
EOSINOPHIL NFR BLD AUTO: 2 % — SIGNIFICANT CHANGE UP (ref 0–6)
ETHANOL SERPL-MCNC: <10 MG/DL — SIGNIFICANT CHANGE UP (ref 0–10)
GLUCOSE SERPL-MCNC: 120 MG/DL — HIGH (ref 70–99)
GLUCOSE UR QL: NEGATIVE MG/DL — SIGNIFICANT CHANGE UP
HCT VFR BLD CALC: 34.5 % — LOW (ref 39–50)
HGB BLD-MCNC: 11.5 G/DL — LOW (ref 13–17)
HYPOCHROMIA BLD QL: SLIGHT — SIGNIFICANT CHANGE UP
INR BLD: 1.06 RATIO — SIGNIFICANT CHANGE UP (ref 0.85–1.18)
KETONES UR-MCNC: NEGATIVE MG/DL — SIGNIFICANT CHANGE UP
LEUKOCYTE ESTERASE UR-ACNC: NEGATIVE — SIGNIFICANT CHANGE UP
LIDOCAIN IGE QN: 38 U/L — SIGNIFICANT CHANGE UP (ref 13–75)
LYMPHOCYTES # BLD AUTO: 0.83 K/UL — LOW (ref 1–3.3)
LYMPHOCYTES # BLD AUTO: 10 % — LOW (ref 13–44)
MANUAL SMEAR VERIFICATION: SIGNIFICANT CHANGE UP
MCHC RBC-ENTMCNC: 28.8 PG — SIGNIFICANT CHANGE UP (ref 27–34)
MCHC RBC-ENTMCNC: 33.3 GM/DL — SIGNIFICANT CHANGE UP (ref 32–36)
MCV RBC AUTO: 86.3 FL — SIGNIFICANT CHANGE UP (ref 80–100)
MONOCYTES # BLD AUTO: 0.99 K/UL — HIGH (ref 0–0.9)
MONOCYTES NFR BLD AUTO: 12 % — SIGNIFICANT CHANGE UP (ref 2–14)
MYELOCYTES NFR BLD: 1 % — HIGH (ref 0–0)
NEUTROPHILS # BLD AUTO: 6.22 K/UL — SIGNIFICANT CHANGE UP (ref 1.8–7.4)
NEUTROPHILS NFR BLD AUTO: 74 % — SIGNIFICANT CHANGE UP (ref 43–77)
NEUTS BAND # BLD: 1 % — SIGNIFICANT CHANGE UP (ref 0–8)
NITRITE UR-MCNC: NEGATIVE — SIGNIFICANT CHANGE UP
NRBC # BLD: 0 /100 WBCS — SIGNIFICANT CHANGE UP (ref 0–0)
NRBC # BLD: SIGNIFICANT CHANGE UP /100 WBCS (ref 0–0)
OVALOCYTES BLD QL SMEAR: SLIGHT — SIGNIFICANT CHANGE UP
PH UR: 7 — SIGNIFICANT CHANGE UP (ref 5–8)
PLAT MORPH BLD: NORMAL — SIGNIFICANT CHANGE UP
PLATELET # BLD AUTO: 405 K/UL — HIGH (ref 150–400)
POIKILOCYTOSIS BLD QL AUTO: SLIGHT — SIGNIFICANT CHANGE UP
POTASSIUM SERPL-MCNC: 4.7 MMOL/L — SIGNIFICANT CHANGE UP (ref 3.5–5.3)
POTASSIUM SERPL-SCNC: 4.7 MMOL/L — SIGNIFICANT CHANGE UP (ref 3.5–5.3)
PROT SERPL-MCNC: 7.2 GM/DL — SIGNIFICANT CHANGE UP (ref 6–8.3)
PROT UR-MCNC: NEGATIVE MG/DL — SIGNIFICANT CHANGE UP
PROTHROM AB SERPL-ACNC: 12 SEC — SIGNIFICANT CHANGE UP (ref 9.5–13)
RBC # BLD: 4 M/UL — LOW (ref 4.2–5.8)
RBC # FLD: 18.6 % — HIGH (ref 10.3–14.5)
RBC BLD AUTO: ABNORMAL
SODIUM SERPL-SCNC: 126 MMOL/L — LOW (ref 135–145)
SP GR SPEC: 1.01 — SIGNIFICANT CHANGE UP (ref 1–1.03)
TARGETS BLD QL SMEAR: SLIGHT — SIGNIFICANT CHANGE UP
TROPONIN I, HIGH SENSITIVITY RESULT: 9.56 NG/L — SIGNIFICANT CHANGE UP
UROBILINOGEN FLD QL: 0.2 MG/DL — SIGNIFICANT CHANGE UP (ref 0.2–1)
WBC # BLD: 8.29 K/UL — SIGNIFICANT CHANGE UP (ref 3.8–10.5)
WBC # FLD AUTO: 8.29 K/UL — SIGNIFICANT CHANGE UP (ref 3.8–10.5)

## 2024-04-02 PROCEDURE — 99285 EMERGENCY DEPT VISIT HI MDM: CPT

## 2024-04-02 PROCEDURE — 85730 THROMBOPLASTIN TIME PARTIAL: CPT

## 2024-04-02 PROCEDURE — 80061 LIPID PANEL: CPT

## 2024-04-02 PROCEDURE — 84484 ASSAY OF TROPONIN QUANT: CPT

## 2024-04-02 PROCEDURE — 95816 EEG AWAKE AND DROWSY: CPT

## 2024-04-02 PROCEDURE — 95700 EEG CONT REC W/VID EEG TECH: CPT

## 2024-04-02 PROCEDURE — 70450 CT HEAD/BRAIN W/O DYE: CPT | Mod: 26,MC

## 2024-04-02 PROCEDURE — 97116 GAIT TRAINING THERAPY: CPT | Mod: GP

## 2024-04-02 PROCEDURE — 95714 VEEG EA 12-26 HR UNMNTR: CPT

## 2024-04-02 PROCEDURE — 71260 CT THORAX DX C+: CPT | Mod: 26,MC

## 2024-04-02 PROCEDURE — 93306 TTE W/DOPPLER COMPLETE: CPT

## 2024-04-02 PROCEDURE — 76376 3D RENDER W/INTRP POSTPROCES: CPT | Mod: 26

## 2024-04-02 PROCEDURE — 85610 PROTHROMBIN TIME: CPT

## 2024-04-02 PROCEDURE — C9113: CPT

## 2024-04-02 PROCEDURE — 36415 COLL VENOUS BLD VENIPUNCTURE: CPT

## 2024-04-02 PROCEDURE — 83540 ASSAY OF IRON: CPT

## 2024-04-02 PROCEDURE — 70486 CT MAXILLOFACIAL W/O DYE: CPT | Mod: 26,MC

## 2024-04-02 PROCEDURE — 80053 COMPREHEN METABOLIC PANEL: CPT

## 2024-04-02 PROCEDURE — 85025 COMPLETE CBC W/AUTO DIFF WBC: CPT

## 2024-04-02 PROCEDURE — 97163 PT EVAL HIGH COMPLEX 45 MIN: CPT | Mod: GP

## 2024-04-02 PROCEDURE — 74177 CT ABD & PELVIS W/CONTRAST: CPT | Mod: 26,MC

## 2024-04-02 PROCEDURE — 83550 IRON BINDING TEST: CPT

## 2024-04-02 PROCEDURE — 93005 ELECTROCARDIOGRAM TRACING: CPT

## 2024-04-02 PROCEDURE — 72125 CT NECK SPINE W/O DYE: CPT | Mod: 26,MC

## 2024-04-02 PROCEDURE — 83036 HEMOGLOBIN GLYCOSYLATED A1C: CPT

## 2024-04-02 PROCEDURE — 85027 COMPLETE CBC AUTOMATED: CPT

## 2024-04-02 PROCEDURE — 82728 ASSAY OF FERRITIN: CPT

## 2024-04-02 RX ORDER — PANTOPRAZOLE SODIUM 20 MG/1
80 TABLET, DELAYED RELEASE ORAL ONCE
Refills: 0 | Status: COMPLETED | OUTPATIENT
Start: 2024-04-02 | End: 2024-04-02

## 2024-04-02 RX ORDER — NAPHAZOLINE HCL 0.1 %
1 DROPS OPHTHALMIC (EYE)
Qty: 0 | Refills: 0 | DISCHARGE

## 2024-04-02 RX ORDER — ZOLPIDEM TARTRATE 10 MG/1
1 TABLET ORAL
Qty: 0 | Refills: 0 | DISCHARGE

## 2024-04-02 RX ORDER — MULTIVIT-MIN/FERROUS GLUCONATE 9 MG/15 ML
1 LIQUID (ML) ORAL
Qty: 0 | Refills: 0 | DISCHARGE

## 2024-04-02 RX ORDER — SODIUM CHLORIDE 9 MG/ML
1000 INJECTION INTRAMUSCULAR; INTRAVENOUS; SUBCUTANEOUS ONCE
Refills: 0 | Status: COMPLETED | OUTPATIENT
Start: 2024-04-02 | End: 2024-04-02

## 2024-04-02 RX ORDER — OXYMETAZOLINE HYDROCHLORIDE 0.5 MG/ML
2 SPRAY NASAL
Qty: 0 | Refills: 0 | DISCHARGE

## 2024-04-02 RX ORDER — LATANOPROST 0.05 MG/ML
1 SOLUTION/ DROPS OPHTHALMIC; TOPICAL
Refills: 0 | DISCHARGE

## 2024-04-02 RX ORDER — ACETAMINOPHEN 500 MG
2 TABLET ORAL
Qty: 0 | Refills: 0 | DISCHARGE

## 2024-04-02 RX ORDER — TAMSULOSIN HYDROCHLORIDE 0.4 MG/1
1 CAPSULE ORAL
Qty: 0 | Refills: 0 | DISCHARGE

## 2024-04-02 RX ORDER — BUPROPION HYDROCHLORIDE 150 MG/1
1 TABLET, EXTENDED RELEASE ORAL
Refills: 0 | DISCHARGE

## 2024-04-02 RX ORDER — PANTOPRAZOLE SODIUM 20 MG/1
8 TABLET, DELAYED RELEASE ORAL
Qty: 80 | Refills: 0 | Status: DISCONTINUED | OUTPATIENT
Start: 2024-04-02 | End: 2024-04-03

## 2024-04-02 RX ORDER — OLANZAPINE 15 MG/1
1 TABLET, FILM COATED ORAL
Qty: 0 | Refills: 0 | DISCHARGE

## 2024-04-02 RX ORDER — DORZOLAMIDE HYDROCHLORIDE TIMOLOL MALEATE 20; 5 MG/ML; MG/ML
1 SOLUTION/ DROPS OPHTHALMIC
Refills: 0 | DISCHARGE

## 2024-04-02 RX ADMIN — SODIUM CHLORIDE 1000 MILLILITER(S): 9 INJECTION INTRAMUSCULAR; INTRAVENOUS; SUBCUTANEOUS at 22:01

## 2024-04-02 RX ADMIN — PANTOPRAZOLE SODIUM 40 MILLIGRAM(S): 20 TABLET, DELAYED RELEASE ORAL at 19:42

## 2024-04-02 RX ADMIN — SODIUM CHLORIDE 1000 MILLILITER(S): 9 INJECTION INTRAMUSCULAR; INTRAVENOUS; SUBCUTANEOUS at 19:25

## 2024-04-02 RX ADMIN — PANTOPRAZOLE SODIUM 10 MG/HR: 20 TABLET, DELAYED RELEASE ORAL at 22:40

## 2024-04-02 NOTE — ED PROVIDER NOTE - CLINICAL SUMMARY MEDICAL DECISION MAKING FREE TEXT BOX
72 yo male presents with fall down stairs on thursday with +LOC and melena starting saturday. Pt states it is similar for his PUD.   WIll check labs, CTs, and reeval. -Michael Granger PA-C

## 2024-04-02 NOTE — ED ADULT NURSE NOTE - OBJECTIVE STATEMENT
pt. came in with c/o mechanical for last thurs. and woke up after 48 hrs., had tarry stool as claimed , denies any pain, double vision, alert and oriented, safety maintained applied.

## 2024-04-02 NOTE — ED PROVIDER NOTE - ATTENDING APP SHARED VISIT CONTRIBUTION OF CARE
I, Nabil Ballard MD, personally saw the patient with VICKEY.  I have personally performed a face to face diagnostic evaluation on this patient.  I have reviewed the VICKEY note and agree with the history, exam, and plan of care, except as noted.  I personally saw the patient and performed a substantive portion of the visit including all aspects of the medical decision making.

## 2024-04-02 NOTE — ED ADULT NURSE NOTE - NSFALLRISKINTERV_ED_ALL_ED

## 2024-04-02 NOTE — PHARMACOTHERAPY INTERVENTION NOTE - COMMENTS
pt does not know the directions to his meds, not the strengths he is currently taking.  pt is confused as to the directions for his eye drops; confirms that he has one that is once a day and one that is 2x a day but stated, "the one that is 2x a day goes only in the left eye ... and the one that I do in BOTH eyes I do twice a day."  Pharmacy will contact pt's retail pharmacy in AM to attain the prescribed directions on pt's meds.  Current meds entered are taken from pt's [facility] profile.

## 2024-04-02 NOTE — ED ADULT TRIAGE NOTE - CHIEF COMPLAINT QUOTE
Pt presents to er with complaints of mechanical fall down a flight of stairs 2 days ago with pos head strike and black tarry stools, not on AC, pt is A&OX4, denies change in vision.

## 2024-04-02 NOTE — ED PROVIDER NOTE - OBJECTIVE STATEMENT
72 yo male with a PMH of PUD, CVA without residual effects, PTSD, anxiety presents with fall on thursday and melena on saturday. Pt states he tripped an dfell down his cement stairs going to the basement with +LOC x 2 days and sustained abrasion to the arms and legs. Pt states saturday he noticed black stool which he had before with his PUD.   Denies cp, sob, abd pain, neck pain.

## 2024-04-02 NOTE — ED ADULT NURSE NOTE - BEFAST SCREENING
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   PT AGREED TO MIN TX HOWEVER REFUSED GT     Pain Rating:  (PT C/O PAIN NO NUMBER GIVEN)                   Objective:   Patient found with: peripheral IV, oxygen    Functional Mobility:  Bed Mobility:   Rolling/Turning to Left: Modified independent  Supine to Sit: Modified Independent    Transfers:       Gait:   Gait Distance: NA PT REFUSED    Balance:   Static Sit: NORMAL: No deviations seen in posture held statically  Dynamic Sit: GOOD+: Maintains balance through MAXIMAL excursions of active trunk motion      Therapeutic Activities and Exercises:  PT SEATED EOB FOR B LE TE X 15 REPS OF AP, TKE, PT REFUSED OOB TO CHAIR AND GT TRAINING. PT RETURNED SUP IN BED IND. PT LEFT WITH ALL NEEDS MET      AM-PAC 6 CLICK MOBILITY  How much help from another person does this patient currently need?   1 = Unable, Total/Dependent Assistance  2 = A lot, Maximum/Moderate Assistance  3 = A little, Minimum/Contact Guard/Supervision  4 = None, Modified [place]/Independent         AM-PAC Raw Score CMS G-Code Modifier Level of Impairment Assistance   6 % Total / Unable   7 - 9 CM 80 - 100% Maximal Assist   10 - 14 CL 60 - 80% Moderate Assist   15 - 19 CK 40 - 60% Moderate Assist   20 - 22 CJ 20 - 40% Minimal Assist   23 CI 1-20% SBA / CGA   24 CH 0% Independent/ Mod I     Patient left supine with call button in reach.    Assessment:  PT MARJ MIN TX AS PT WAITING TO GET PEG TUBE. PT REFUSED GT.     Rehab  identified problem list/impairments: Rehab identified problem list/impairments: weakness, impaired endurance, impaired functional mobilty, gait instability, impaired balance, pain    Rehab potential is good.    Activity tolerance: Poor    Discharge recommendations: Discharge Facility/Level Of Care Needs: nursing facility, skilled     Barriers to discharge: Barriers to Discharge: None    Equipment recommendations:       GOALS:   Physical Therapy Goals        Problem: Physical Therapy Goal    Goal Priority Disciplines Outcome Goal Variances Interventions   Physical Therapy Goal     PT/OT, PT      Description:  PT WILL BE SEEN FOR P.T. FOR A MIN OF 5 OUT OF 7 DAYS A WEEK  LTG: 3/14/17  1. PT WILL COMPLETE BED MOBILITY IND  2. PT WILL STAND WITH RW FOR GT X 50' WITH CGA.  3. PT WILL T/F TO CHAIR WITH S.  4. PT WILL COMPLETE B LE TE X 20 REPS FOR STRENGTHENING.               PLAN:    Patient to be seen    to address the above listed problems via gait training, therapeutic activities, therapeutic exercises  Plan of Care expires: 03/14/17  Plan of Care reviewed with: patient         Jojo Meena, PT  03/09/2017     Negative

## 2024-04-02 NOTE — ED PROVIDER NOTE - PROGRESS NOTE DETAILS
ED Attending Dr. Dixon, pt presents to ED for fall and dizziness.  Pt states that he fell down stairs 3 days ago.  Pt states he was found by dog and brought to bed.  Neighbors came in and checked on pt and concern for dizziness and pt came to ED.  + dark stools for the last several days with hx of stomach ulcer.  On exam pt with ecchymosis forehead, poor dentition, bruising ot arms.  Plan check labs and pan scan. labs with Na of 126. Otherwise, labs and CTs unremarkable. Will admit and have GI consult on pt for the melena. Pt was made aware of the plan and results. -Michael Granger PA-C

## 2024-04-02 NOTE — ED ADULT NURSE NOTE - MUSCULOSKELETAL ASSESSMENT
Chief Complaint   Patient presents with   • Diabetes     shoes       HISTORY OF PRESENT ILLNESS: Cherelle Pineda is a 89 year old  female patient of Madelyn who presents for consideration of diabetic shoes    Past Medical History:   Diagnosis Date   • Adenomatous polyps    • Breast cancer (CMD)    • Bursitis, trochanteric    • Cataract    • Clostridium difficile infection    • Diabetes mellitus (CMD)    • Ectopic pregnancy     x2   • Hyperlipidemia    • Hypertension    • Obesity    • Osteopenia 2005         Past Surgical History:   Procedure Laterality Date   • Appendectomy     • Breast lumpectomy      right   • Cataract extrac w/ intraocular lens imp&ant vit,bilaterl  2014   •  section, classic      x3   • Colonoscopy  [id number]   • Colonoscopy diagnostic  2007   • Dexa bone density axial skeleton  2008   • Ectopic pregnancy surgery     • Hysterectomy      andrew/bso   • Xray mammogram screening bilat  2012       MEDICATIONS:  Current Outpatient Medications   Medication Sig Dispense Refill   • furosemide (LASIX) 40 MG tablet TAKE 1 (ONE) TABLET BY MOUTH ONCE DAILY 2021- NOTE INCREASE IN DOSE.     • metoPROLOL tartrate (LOPRESSOR) 50 MG tablet TAKE 1 TABLET BY MOUTH TWICE A  tablet 1   • simvastatin (ZOCOR) 40 MG tablet TAKE 1 TABLET BY MOUTH EVERYDAY AT BEDTIME 90 tablet 1   • pioglitazone (ACTOS) 45 MG tablet TAKE 1 TABLET BY MOUTH EVERY DAY 90 tablet 1   • Klor-Con M 10 MEQ gauri ER tablet TAKE 1 TABLET BY MOUTH EVERY DAY 90 tablet 3   • Accu-Chek Lindsay Plus test strip TEST BLOOD SUGARS TWICE DAILY 200 strip 3   • HYDROcodone-acetaminophen (NORCO) 5-325 MG per tablet TAKE 1 (ONE) TABLET BY MOUTH 4 TIMES DAILY AS NEEDED (SIGNIFICANT PAIN)     • losartan (COZAAR) 100 MG tablet Take 1 tablet by mouth daily. 90 tablet 3   • isosorbide mononitrate (IMDUR) 30 MG 24 hr tablet daily.     • famotidine (PEPCID) 20 MG tablet Take 20 mg by mouth daily.  60 tablet 5   • SOFTCLIX  LANCETS Misc TEST BLOOD SUGAR TWICE DAILY 100 each 1   • blood glucose meter Test blood sugar 2 times daily as directed. Diagnosis: E11.9. Meter: Accucheck Lindsay Meter 1 kit 0   • acetaminophen (TYLENOL) 325 MG tablet Take 650 mg by mouth every 6 hours.     • acetaminophen (TYLENOL) 650 MG CR tablet Take 650 mg by mouth every 8 hours as needed for Pain. Occ use of 500mg tablets     • calcium carbonate-vitamin D (CALTRATE+D) 600-400 MG-UNIT per tablet Take 1 tablet by mouth daily.      • Daily Multiple Vitamins TABS Take  by mouth.       No current facility-administered medications for this visit.       ALLERGIES:  Allergies as of 04/18/2023 - Reviewed 04/18/2023   Allergen Reaction Noted   • Clindamycin DIARRHEA 10/14/2013   • Enalapril Cough and Other (See Comments)    • Nitrofurantoin GI UPSET        REVIEW OF SYSTEMS:  Constitutional:  Denies fever or chills, weight gain or loss.    Respiratory:  Denies shortness of breath, cough or wheezing.   Cardiovascular:  Denies chest pain or palpitations, no PND or orthopnea.      PHYSICAL EXAMINATION:  Constitutional:  Well-developed, well-nourished, no acute distress, non-toxic appearance.    Diabetic Foot Exam Documentation     Normal Bilateral Foot Exam:  Skin integrity is normal. Dorsalis pedis and posterior tibial pulses are present.  Pressure sensation using the Malin-Layton monofilament is present.    Patient has bilateral bunions and calluses     Optional Vibratory Assessment:  Not Completed          ASSESSMENT:   1. Controlled type 2 diabetes mellitus with chronic kidney disease, without long-term current use of insulin, unspecified CKD stage (CMD)    2. Hypertension complicating diabetes (CMD)    3. Hyperlipidemia associated with type 2 diabetes mellitus (CMD)        PLAN:  Orders Placed This Encounter   • furosemide (LASIX) 40 MG tablet     Return if symptoms worsen or fail to improve.        Addendum:  Face-to-face consultation regarding need for diabetic  shoes    Patient has diabetes mellitus type 2 which is being followed by me under comprehensive plan of management.  Patient does have pre ulcerative calluses on his feet.  Patient was authorize to obtain diabetic shoes with inserts     - - -

## 2024-04-02 NOTE — ED PROVIDER NOTE - CARE PLAN
Principal Discharge DX:	Melena  Secondary Diagnosis:	Fall down stairs, initial encounter  Secondary Diagnosis:	Head injury   1 Principal Discharge DX:	Melena  Secondary Diagnosis:	Fall down stairs, initial encounter  Secondary Diagnosis:	Head injury  Secondary Diagnosis:	Hyponatremia

## 2024-04-03 LAB
A1C WITH ESTIMATED AVERAGE GLUCOSE RESULT: 5.7 % — HIGH (ref 4–5.6)
ALBUMIN SERPL ELPH-MCNC: 2.8 G/DL — LOW (ref 3.3–5)
ALP SERPL-CCNC: 125 U/L — HIGH (ref 40–120)
ALT FLD-CCNC: 70 U/L — SIGNIFICANT CHANGE UP (ref 12–78)
ANION GAP SERPL CALC-SCNC: 5 MMOL/L — SIGNIFICANT CHANGE UP (ref 5–17)
APTT BLD: 34.1 SEC — SIGNIFICANT CHANGE UP (ref 24.5–35.6)
AST SERPL-CCNC: 43 U/L — HIGH (ref 15–37)
BASOPHILS # BLD AUTO: 0.04 K/UL — SIGNIFICANT CHANGE UP (ref 0–0.2)
BASOPHILS NFR BLD AUTO: 0.5 % — SIGNIFICANT CHANGE UP (ref 0–2)
BILIRUB SERPL-MCNC: 0.4 MG/DL — SIGNIFICANT CHANGE UP (ref 0.2–1.2)
BUN SERPL-MCNC: 9 MG/DL — SIGNIFICANT CHANGE UP (ref 7–23)
CALCIUM SERPL-MCNC: 9.7 MG/DL — SIGNIFICANT CHANGE UP (ref 8.5–10.1)
CHLORIDE SERPL-SCNC: 101 MMOL/L — SIGNIFICANT CHANGE UP (ref 96–108)
CHOLEST SERPL-MCNC: 135 MG/DL — SIGNIFICANT CHANGE UP
CO2 SERPL-SCNC: 25 MMOL/L — SIGNIFICANT CHANGE UP (ref 22–31)
CREAT SERPL-MCNC: 0.8 MG/DL — SIGNIFICANT CHANGE UP (ref 0.5–1.3)
EGFR: 95 ML/MIN/1.73M2 — SIGNIFICANT CHANGE UP
EOSINOPHIL # BLD AUTO: 0.13 K/UL — SIGNIFICANT CHANGE UP (ref 0–0.5)
EOSINOPHIL NFR BLD AUTO: 1.6 % — SIGNIFICANT CHANGE UP (ref 0–6)
ESTIMATED AVERAGE GLUCOSE: 117 MG/DL — HIGH (ref 68–114)
FERRITIN SERPL-MCNC: 442 NG/ML — HIGH (ref 30–400)
GLUCOSE SERPL-MCNC: 97 MG/DL — SIGNIFICANT CHANGE UP (ref 70–99)
HCT VFR BLD CALC: 35.3 % — LOW (ref 39–50)
HDLC SERPL-MCNC: 33 MG/DL — LOW
HGB BLD-MCNC: 11.6 G/DL — LOW (ref 13–17)
IMM GRANULOCYTES NFR BLD AUTO: 3.4 % — HIGH (ref 0–0.9)
INR BLD: 1.08 RATIO — SIGNIFICANT CHANGE UP (ref 0.85–1.18)
IRON SATN MFR SERPL: 11 % — LOW (ref 16–55)
IRON SATN MFR SERPL: 28 UG/DL — LOW (ref 45–165)
LIPID PNL WITH DIRECT LDL SERPL: 79 MG/DL — SIGNIFICANT CHANGE UP
LYMPHOCYTES # BLD AUTO: 1.07 K/UL — SIGNIFICANT CHANGE UP (ref 1–3.3)
LYMPHOCYTES # BLD AUTO: 13.5 % — SIGNIFICANT CHANGE UP (ref 13–44)
MCHC RBC-ENTMCNC: 28.6 PG — SIGNIFICANT CHANGE UP (ref 27–34)
MCHC RBC-ENTMCNC: 32.9 GM/DL — SIGNIFICANT CHANGE UP (ref 32–36)
MCV RBC AUTO: 86.9 FL — SIGNIFICANT CHANGE UP (ref 80–100)
MONOCYTES # BLD AUTO: 0.83 K/UL — SIGNIFICANT CHANGE UP (ref 0–0.9)
MONOCYTES NFR BLD AUTO: 10.4 % — SIGNIFICANT CHANGE UP (ref 2–14)
NEUTROPHILS # BLD AUTO: 5.61 K/UL — SIGNIFICANT CHANGE UP (ref 1.8–7.4)
NEUTROPHILS NFR BLD AUTO: 70.6 % — SIGNIFICANT CHANGE UP (ref 43–77)
NON HDL CHOLESTEROL: 102 MG/DL — SIGNIFICANT CHANGE UP
PLATELET # BLD AUTO: 412 K/UL — HIGH (ref 150–400)
POTASSIUM SERPL-MCNC: 4.3 MMOL/L — SIGNIFICANT CHANGE UP (ref 3.5–5.3)
POTASSIUM SERPL-SCNC: 4.3 MMOL/L — SIGNIFICANT CHANGE UP (ref 3.5–5.3)
PROT SERPL-MCNC: 7 GM/DL — SIGNIFICANT CHANGE UP (ref 6–8.3)
PROTHROM AB SERPL-ACNC: 12.2 SEC — SIGNIFICANT CHANGE UP (ref 9.5–13)
RBC # BLD: 4.06 M/UL — LOW (ref 4.2–5.8)
RBC # FLD: 18.6 % — HIGH (ref 10.3–14.5)
SODIUM SERPL-SCNC: 131 MMOL/L — LOW (ref 135–145)
TIBC SERPL-MCNC: 250 UG/DL — SIGNIFICANT CHANGE UP (ref 220–430)
TRIGL SERPL-MCNC: 125 MG/DL — SIGNIFICANT CHANGE UP
TROPONIN I, HIGH SENSITIVITY RESULT: 11.35 NG/L — SIGNIFICANT CHANGE UP
UIBC SERPL-MCNC: 222 UG/DL — SIGNIFICANT CHANGE UP (ref 110–370)
WBC # BLD: 7.95 K/UL — SIGNIFICANT CHANGE UP (ref 3.8–10.5)
WBC # FLD AUTO: 7.95 K/UL — SIGNIFICANT CHANGE UP (ref 3.8–10.5)

## 2024-04-03 PROCEDURE — 99223 1ST HOSP IP/OBS HIGH 75: CPT

## 2024-04-03 PROCEDURE — 99222 1ST HOSP IP/OBS MODERATE 55: CPT

## 2024-04-03 PROCEDURE — 95816 EEG AWAKE AND DROWSY: CPT | Mod: 26

## 2024-04-03 PROCEDURE — 93010 ELECTROCARDIOGRAM REPORT: CPT

## 2024-04-03 PROCEDURE — 93306 TTE W/DOPPLER COMPLETE: CPT | Mod: 26

## 2024-04-03 RX ORDER — CLONAZEPAM 1 MG
1 TABLET ORAL
Qty: 0 | Refills: 0 | DISCHARGE

## 2024-04-03 RX ORDER — SERTRALINE 25 MG/1
100 TABLET, FILM COATED ORAL DAILY
Refills: 0 | Status: DISCONTINUED | OUTPATIENT
Start: 2024-04-03 | End: 2024-04-06

## 2024-04-03 RX ORDER — BUPROPION HYDROCHLORIDE 150 MG/1
150 TABLET, EXTENDED RELEASE ORAL DAILY
Refills: 0 | Status: DISCONTINUED | OUTPATIENT
Start: 2024-04-03 | End: 2024-04-06

## 2024-04-03 RX ORDER — SERTRALINE 25 MG/1
1 TABLET, FILM COATED ORAL
Refills: 0 | DISCHARGE

## 2024-04-03 RX ORDER — INFLUENZA VIRUS VACCINE 15; 15; 15; 15 UG/.5ML; UG/.5ML; UG/.5ML; UG/.5ML
0.7 SUSPENSION INTRAMUSCULAR ONCE
Refills: 0 | Status: DISCONTINUED | OUTPATIENT
Start: 2024-04-03 | End: 2024-04-06

## 2024-04-03 RX ORDER — OXYMETAZOLINE HYDROCHLORIDE 0.5 MG/ML
2 SPRAY NASAL
Refills: 0 | Status: DISCONTINUED | OUTPATIENT
Start: 2024-04-03 | End: 2024-04-06

## 2024-04-03 RX ORDER — PANTOPRAZOLE SODIUM 20 MG/1
40 TABLET, DELAYED RELEASE ORAL
Refills: 0 | Status: DISCONTINUED | OUTPATIENT
Start: 2024-04-03 | End: 2024-04-06

## 2024-04-03 RX ORDER — LATANOPROST 0.05 MG/ML
1 SOLUTION/ DROPS OPHTHALMIC; TOPICAL AT BEDTIME
Refills: 0 | Status: DISCONTINUED | OUTPATIENT
Start: 2024-04-03 | End: 2024-04-06

## 2024-04-03 RX ORDER — DORZOLAMIDE HYDROCHLORIDE TIMOLOL MALEATE 20; 5 MG/ML; MG/ML
1 SOLUTION/ DROPS OPHTHALMIC
Refills: 0 | Status: DISCONTINUED | OUTPATIENT
Start: 2024-04-03 | End: 2024-04-06

## 2024-04-03 RX ORDER — SODIUM CHLORIDE 9 MG/ML
750 INJECTION INTRAMUSCULAR; INTRAVENOUS; SUBCUTANEOUS
Refills: 0 | Status: DISCONTINUED | OUTPATIENT
Start: 2024-04-03 | End: 2024-04-03

## 2024-04-03 RX ADMIN — LATANOPROST 1 DROP(S): 0.05 SOLUTION/ DROPS OPHTHALMIC; TOPICAL at 22:45

## 2024-04-03 RX ADMIN — PANTOPRAZOLE SODIUM 10 MG/HR: 20 TABLET, DELAYED RELEASE ORAL at 05:13

## 2024-04-03 RX ADMIN — SODIUM CHLORIDE 75 MILLILITER(S): 9 INJECTION INTRAMUSCULAR; INTRAVENOUS; SUBCUTANEOUS at 06:14

## 2024-04-03 RX ADMIN — DORZOLAMIDE HYDROCHLORIDE TIMOLOL MALEATE 1 DROP(S): 20; 5 SOLUTION/ DROPS OPHTHALMIC at 22:44

## 2024-04-03 RX ADMIN — BUPROPION HYDROCHLORIDE 150 MILLIGRAM(S): 150 TABLET, EXTENDED RELEASE ORAL at 09:36

## 2024-04-03 RX ADMIN — SERTRALINE 100 MILLIGRAM(S): 25 TABLET, FILM COATED ORAL at 09:37

## 2024-04-03 RX ADMIN — DORZOLAMIDE HYDROCHLORIDE TIMOLOL MALEATE 1 DROP(S): 20; 5 SOLUTION/ DROPS OPHTHALMIC at 09:37

## 2024-04-03 RX ADMIN — OXYMETAZOLINE HYDROCHLORIDE 2 SPRAY(S): 0.5 SPRAY NASAL at 16:24

## 2024-04-03 NOTE — PHYSICAL THERAPY INITIAL EVALUATION ADULT - NSPTDMEREC_GEN_A_CORE
Patient will require a Rolling Walker due to their diagnosis of Syncope for balance/gait stability and to assist with MRADL's (Mobility Related Activities of Daily Living)./rolling walker
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
## 2024-04-03 NOTE — PHYSICAL THERAPY INITIAL EVALUATION ADULT - MODALITIES TREATMENT COMMENTS
Pt left supine in bed at patient's request with IV and Tele HM Both intact. CBIR. Pt instructed to not get up alone. Bed alarm activated. RN aware

## 2024-04-03 NOTE — CONSULT NOTE ADULT - SUBJECTIVE AND OBJECTIVE BOX
Neurology Consult requested by:   Patient is a 73y old  Male who presents with a chief complaint of syncope (03 Apr 2024 05:35)     HPI:  74 y/o M w/ PMH of anxiety, PTSD, CVA, duodenal ulcer, gastritis, chronic back pain, HTN, p/w syncope. Patient states that on Wednesday he came back to the house after walking his dog and felt dizziness.  Went to reach down for the banister, and woke up on the floor, 2 days later.  Denies incontinence / tongue bite. Patient states he has been having episodes of light headedness for the last few weeks. No vertigo. Denies CP, palpitations, PHILLIPS.  Reviewing the old chart, there were several admission in June/ september 2014, for syncope, witnessed seizure activity. MR head + for right WILIAM infarct. An EEG is mentioned to have showed right sided seizure activity. was placed on dilantin for a time. then switched to clonazepam, also for anxiety. Was in   2022 for syncope, again EEG then showed generalized slowing.   Hx of chronic back pain, followed by  spine care. Since fall c/o more LBP, non radiating.    PSH:  Denies      Social Hx: Denies tobacco / etoh / drugs     Family Hx: Denies pertinent hx  (03 Apr 2024 05:35)      PAST MEDICAL & SURGICAL HISTORY:  Retinal arterial branch occlusion, right  vision loss right eye      Backache  spinal stenosis      CVA (cerebral infarction)  6/2014- small rt ant.cerebral artery subacute infarct      Depression      UTI (lower urinary tract infection)  mssa      Retention of urine  6/2014      Anemia      Seizure  6/2014- abnormal eeg      FTT (failure to thrive) in adult  6/2014      Hyponatremia  124 in June 2014      HTN (hypertension)      Alcohol abuse  hx of etoh abuse      No significant past surgical history        FAMILY HISTORY:  Family history unknown      Social Hx:  Nonsmoker, no drug or alcohol use  Medications and Allergies ReviewedMEDICATIONS  (STANDING):  buPROPion XL (24-Hour) . 150 milliGRAM(s) Oral daily  dorzolamide 2%/timolol 0.5% Ophthalmic Solution 1 Drop(s) Both EYES two times a day  influenza  Vaccine (HIGH DOSE) 0.7 milliLiter(s) IntraMuscular once  latanoprost 0.005% Ophthalmic Solution 1 Drop(s) Both EYES at bedtime  pantoprazole Infusion 8 mG/Hr (10 mL/Hr) IV Continuous <Continuous>  sertraline 100 milliGRAM(s) Oral daily  sodium chloride 0.9%. 750 milliLiter(s) (75 mL/Hr) IV Continuous <Continuous>     ROS: Pertinent positives in HPI, all other ROS were reviewed and are negative.      Examination:   Vital Signs Last 24 Hrs  T(C): 37.1 (03 Apr 2024 07:20), Max: 37.1 (03 Apr 2024 07:20)  T(F): 98.7 (03 Apr 2024 07:20), Max: 98.7 (03 Apr 2024 07:20)  HR: 96 (03 Apr 2024 07:20) (87 - 96)  BP: 160/98 (03 Apr 2024 07:20) (131/90 - 164/95)  BP(mean): 103 (02 Apr 2024 23:00) (103 - 103)  RR: 18 (03 Apr 2024 07:20) (18 - 20)  SpO2: 97% (03 Apr 2024 07:20) (96% - 98%)    Parameters below as of 03 Apr 2024 07:20  Patient On (Oxygen Delivery Method): room air      General: Cooperative, NAD   NECK: supple, no masses  ENT: reduced hearing   Vascular : no carotid bruits,   Lungs: CTAB  Chest: RRR, no murmurs  Extremities: nontender, no edema  Musculoskeletal: no adventitious movements, no joint stiffness  Skin: no rash    Neurological Examination:  NIHSS:0  MS: AOx3. Appropriately interactive, normal affect. Speech fluent w/o paraphasic error, repetition, naming,  is intact   CN: Segundo in right eye, Left no VFC, PERLL, EOMI, V1-3 sensation intact, face symmetric, hearing intact, palate elevates symmetrically, tongue midline, SCM equal bilaterally  Motor: distal LEs wasting ant legs, normal tone, no tremor, rigidity or bradykinesia.  5/5 all over   Sens: Intact to light touch.    Reflexes: 1-2/4 all over, downgoing toes b/l  Coord:  No dysmetria, RONAL intact   Gait: not tested    Labs: Reviewed  Complete Blood Count + Automated Diff (04.03.24 @ 07:23)   WBC Count: 7.95 K/uL  RBC Count: 4.06 M/uL  Hemoglobin: 11.6 g/dL  Hematocrit: 35.3 %  Mean Cell Volume: 86.9 fl  Mean Cell Hemoglobin: 28.6 pg  Mean Cell Hemoglobin Conc: 32.9 gm/dL  Red Cell Distrib Width: 18.6 %  Platelet Count - Automated: 412 K/uL    Comprehensive Metabolic Panel (04.03.24 @ 07:23)   Sodium: 131 mmol/L  Potassium: 4.3 mmol/L  Chloride: 101 mmol/L  Carbon Dioxide: 25 mmol/L  Anion Gap: 5 mmol/L  Blood Urea Nitrogen: 9 mg/dL  Creatinine: 0.80 mg/dL  Glucose: 97 mg/dL  Calcium: 9.7 mg/dL  Protein Total: 7.0 gm/dL  Albumin: 2.8 g/dL  Bilirubin Total: 0.4 mg/dL  Alkaline Phosphatase: 125 U/L  Aspartate Aminotransferase (AST/SGOT): 43 U/L  Alanine Aminotransferase (ALT/SGPT): 70 U/L  eGFR: 95:    < from: CT Head No Cont (04.02.24 @ 20:54) >  IMPRESSION:    CT HEAD:  1. No acute hemorrhage, mass effect, or midline shift. If patient   demonstrates persistent headaches or altered mental status, consider   further evaluation via MR imaging to include DWI and ADC mapping   techniques, along with gradient echo acquisition technique, provided   there are no contraindications.  2. Stable prominence of ventricles out of proportion to the sulci.    CT MAXILLOFACIAL:  1. No acute fracture.  2. Significant paranasal sinus inflammatory changes. Correlate clinically   for sinusitis.    CT CERVICAL SPINE:  No acute fracture or traumatic subluxation.  Multi-level degenerative changes as described level by level in detail   above.  If there is clinical concern for myelopathy or radiculopathy,   consider further evaluation via MR imaging of the cervical spine,   provided the patient has no contraindications..

## 2024-04-03 NOTE — PHYSICAL THERAPY INITIAL EVALUATION ADULT - ADDITIONAL COMMENTS
Patient was ambulating Independently with his 100 pound dog PTA. Patient reports he was fully Independent with all ADL tasks at home PTA. Patient claims his neighbors help him out with obtaining groceries and getting him to doctor's appointments.

## 2024-04-03 NOTE — PATIENT PROFILE ADULT - FALL HARM RISK - HARM RISK INTERVENTIONS
Assistance with ambulation/Assistance OOB with selected safe patient handling equipment/Communicate Risk of Fall with Harm to all staff/Discuss with provider need for PT consult/Monitor gait and stability/Reinforce activity limits and safety measures with patient and family/Tailored Fall Risk Interventions/Visual Cue: Yellow wristband and red socks/Bed in lowest position, wheels locked, appropriate side rails in place/Call bell, personal items and telephone in reach/Instruct patient to call for assistance before getting out of bed or chair/Non-slip footwear when patient is out of bed/[place] to call system/Physically safe environment - no spills, clutter or unnecessary equipment/Purposeful Proactive Rounding/Room/bathroom lighting operational, light cord in reach

## 2024-04-03 NOTE — H&P ADULT - HISTORY OF PRESENT ILLNESS
72 y/o M w/ PMH of anxiety, PTSD, CVA, duodenal ulcer, gastritis, chronic back pain, HTN, seizure disorder, p/w syncope. Patient states that on Wednesday he came back to the house after walking his dog and felt dizziness. States he woke up on the floor afterwards after having LOC. Denies incontinence / tongue bite. Patient states he has been having episodes of light headedness for the last few weeks. Patient also has been noticing black stools for the last 3 weeks as well. Denies hematochezia. Patient states he has taken NSAIDs up to 4 times per day up until 10 days ago.     PSH:  Denies      Social Hx: Denies tobacco / etoh / drugs     Family Hx: Denies pertinent hx

## 2024-04-03 NOTE — PHYSICAL THERAPY INITIAL EVALUATION ADULT - LEVEL OF INDEPENDENCE: STAIR NEGOTIATION, REHAB EVAL
Held at this time due to balance impairment - Patient has [address] - Will attempt next PT Session

## 2024-04-03 NOTE — PATIENT PROFILE ADULT - FUNCTIONAL ASSESSMENT - DAILY ACTIVITY SECTION LABEL
. Stelara Counseling:  I discussed with the patient the risks of ustekinumab including but not limited to immunosuppression, malignancy, posterior leukoencephalopathy syndrome, and serious infections.  The patient understands that monitoring is required including a PPD at baseline and must alert us or the primary physician if symptoms of infection or other concerning signs are noted.

## 2024-04-03 NOTE — H&P ADULT - CONVERSATION DETAILS
Patient states his HCP is his daughter Paola. Denies having any limitations on resuscitation status for this admission

## 2024-04-03 NOTE — H&P ADULT - ASSESSMENT
72 y/o M w/ PMH of anxiety, PTSD, CVA, duodenal ulcer, gastritis, chronic back pain, HTN, seizure disorder, p/w syncope    *Melena w/ anemia and h/o Duodenal ulcer  -CT Abd: No acute findings. Duodenal diverticulum   -Trend H/H  -IV PPI  -NPO  -GI consult  -Iron panel  -Counselled on avoiding NSAIDs in the future     *Syncope   -CTH: no acute findings   -Troponin negative x 1   -EKG:   -Echo   -Remote tele   -Cardio consult  -H/o seizure disorder -> Neuro consult   -Seizure precautions     *Hyponatremia  -IVF and recheck Na     *Transaminitis  -F/u GI     *Distended Urinary bladder up to 900cc on CT  -Patient has urinated large amount of urine since coming to floor   -Check post-void residual   -I/Os     *Thrombocytosis  -F/u outpatient for further evalation if remains stable    *H/o anxiety / PTSD / CVA / chronic back pain / HTN  -C/w home meds and f/u outpatient for further management if conditions remain stable during hospitalization     *DVT ppx   -SCDs     >75 mins required for admission

## 2024-04-03 NOTE — PHYSICAL THERAPY INITIAL EVALUATION ADULT - GAIT DEVIATIONS NOTED, PT EVAL
+BLE KNEE BENT STANCE WITH CUES REQUIRED TO ENSURE BLE HEEL STRIKE AND PROPER BLE STANCE INSIDE RW FOR POSTURAL AWARENESS/decreased mohan/decreased step length/decreased stride length

## 2024-04-03 NOTE — CONSULT NOTE ADULT - ASSESSMENT
73 yr old man with prior CVA, hx of focal seizures with 2nd generalization.  Presents with recurrent LOC. Non focal exam. CT head shows no acute changes.   R/o recurrent seizure, ? cardiac cause.  Suggest:  seizure precautions  24 V EEG monitor  EKG monitor  consider cardiac evaluation  
73 year old male with history duodenal ulcer/gastritis on EGD 2022 with dark stools and syncopal episode at home.    Hgb stable, highest on record in EMR without transfusion, black stools on Iron supplementation, now brown since cessation.   Would recommend PPI daily given history duodenal ulcer in the past and NSAID use.  Patient declined any further endoscopic evaluation in hospital and would like to follow up with his personal GI as outpatient, which is reasonable given current stability.     Rec:  ::DC PPI gtt  ::PPI daily ok  ::Follow up outpatient GI for outpatient nonemergent EGD

## 2024-04-03 NOTE — PATIENT PROFILE ADULT - PATIENT REPRESENTATIVE: ( YOU CAN CHOOSE ANY PERSON THAT CAN ASSIST YOU WITH YOUR HEALTH CARE PREFERENCES, DOES NOT HAVE TO BE A SPOUSE, IMMEDIATE FAMILY OR SIGNIFICANT OTHER/PARTNER)
Pt discharged to home in private vehicle accompanied by family member.  Pt escorted to lobby by [facility].   Pt verbalized understanding of discharge education and follow up instructions.  PIV removed.  Telemetry discontinued.  VSS.  Ambulates and is continent.  All belongings with patient.     yes

## 2024-04-03 NOTE — CONSULT NOTE ADULT - NS ATTEND AMEND GEN_ALL_CORE FT
73 year old man here with syncope, consulted for anemia.     Hgb 11 so don't think contributed to syncope.   Dark stools due to iron.   Can give PO PPI as nsaids and prior Gi bleeding.   Follow up with Dr. Cunningham as outpatient.

## 2024-04-04 ENCOUNTER — TRANSCRIPTION ENCOUNTER (OUTPATIENT)
Age: 74
End: 2024-04-04

## 2024-04-04 LAB
HCT VFR BLD CALC: 32.4 % — LOW (ref 39–50)
HGB BLD-MCNC: 10.6 G/DL — LOW (ref 13–17)
MCHC RBC-ENTMCNC: 28.5 PG — SIGNIFICANT CHANGE UP (ref 27–34)
MCHC RBC-ENTMCNC: 32.7 GM/DL — SIGNIFICANT CHANGE UP (ref 32–36)
MCV RBC AUTO: 87.1 FL — SIGNIFICANT CHANGE UP (ref 80–100)
PLATELET # BLD AUTO: 473 K/UL — HIGH (ref 150–400)
RBC # BLD: 3.72 M/UL — LOW (ref 4.2–5.8)
RBC # FLD: 18.5 % — HIGH (ref 10.3–14.5)
WBC # BLD: 9.08 K/UL — SIGNIFICANT CHANGE UP (ref 3.8–10.5)
WBC # FLD AUTO: 9.08 K/UL — SIGNIFICANT CHANGE UP (ref 3.8–10.5)

## 2024-04-04 PROCEDURE — 99233 SBSQ HOSP IP/OBS HIGH 50: CPT

## 2024-04-04 PROCEDURE — 95720 EEG PHY/QHP EA INCR W/VEEG: CPT

## 2024-04-04 PROCEDURE — 99239 HOSP IP/OBS DSCHRG MGMT >30: CPT

## 2024-04-04 RX ORDER — ZONISAMIDE 100 MG
1 CAPSULE ORAL
Qty: 7 | Refills: 0
Start: 2024-04-04 | End: 2024-04-10

## 2024-04-04 RX ORDER — OMEPRAZOLE 10 MG/1
1 CAPSULE, DELAYED RELEASE ORAL
Refills: 0 | DISCHARGE

## 2024-04-04 RX ORDER — PANTOPRAZOLE SODIUM 20 MG/1
1 TABLET, DELAYED RELEASE ORAL
Qty: 60 | Refills: 0
Start: 2024-04-04 | End: 2024-05-03

## 2024-04-04 RX ORDER — SERTRALINE 25 MG/1
1 TABLET, FILM COATED ORAL
Refills: 0 | DISCHARGE

## 2024-04-04 RX ORDER — SERTRALINE 25 MG/1
1 TABLET, FILM COATED ORAL
Qty: 0 | Refills: 0 | DISCHARGE
Start: 2024-04-04

## 2024-04-04 RX ORDER — ZONISAMIDE 100 MG
2 CAPSULE ORAL
Qty: 60 | Refills: 0
Start: 2024-04-04 | End: 2024-05-03

## 2024-04-04 RX ORDER — ZONISAMIDE 100 MG
50 CAPSULE ORAL AT BEDTIME
Refills: 0 | Status: DISCONTINUED | OUTPATIENT
Start: 2024-04-04 | End: 2024-04-06

## 2024-04-04 RX ORDER — CLONAZEPAM 1 MG
1 TABLET ORAL
Qty: 0 | Refills: 0 | DISCHARGE
Start: 2024-04-04

## 2024-04-04 RX ORDER — TAMSULOSIN HYDROCHLORIDE 0.4 MG/1
1 CAPSULE ORAL
Qty: 30 | Refills: 0
Start: 2024-04-04 | End: 2024-05-03

## 2024-04-04 RX ORDER — CLONAZEPAM 1 MG
1 TABLET ORAL
Refills: 0 | Status: DISCONTINUED | OUTPATIENT
Start: 2024-04-04 | End: 2024-04-06

## 2024-04-04 RX ADMIN — PANTOPRAZOLE SODIUM 40 MILLIGRAM(S): 20 TABLET, DELAYED RELEASE ORAL at 06:12

## 2024-04-04 RX ADMIN — Medication 1 MILLIGRAM(S): at 12:57

## 2024-04-04 RX ADMIN — BUPROPION HYDROCHLORIDE 150 MILLIGRAM(S): 150 TABLET, EXTENDED RELEASE ORAL at 09:10

## 2024-04-04 RX ADMIN — Medication 1 MILLIGRAM(S): at 23:14

## 2024-04-04 RX ADMIN — Medication 1 MILLIGRAM(S): at 17:25

## 2024-04-04 RX ADMIN — SERTRALINE 100 MILLIGRAM(S): 25 TABLET, FILM COATED ORAL at 09:10

## 2024-04-04 RX ADMIN — DORZOLAMIDE HYDROCHLORIDE TIMOLOL MALEATE 1 DROP(S): 20; 5 SOLUTION/ DROPS OPHTHALMIC at 23:16

## 2024-04-04 RX ADMIN — Medication 1 MILLIGRAM(S): at 06:11

## 2024-04-04 RX ADMIN — Medication 50 MILLIGRAM(S): at 23:14

## 2024-04-04 RX ADMIN — DORZOLAMIDE HYDROCHLORIDE TIMOLOL MALEATE 1 DROP(S): 20; 5 SOLUTION/ DROPS OPHTHALMIC at 09:10

## 2024-04-04 RX ADMIN — LATANOPROST 1 DROP(S): 0.05 SOLUTION/ DROPS OPHTHALMIC; TOPICAL at 23:15

## 2024-04-04 NOTE — DISCHARGE NOTE PROVIDER - CARE PROVIDERS DIRECT ADDRESSES
,[email or web address],DirectAddress_Unknown ,[email or web address],DirectAddress_Unknown,[email or web address]

## 2024-04-04 NOTE — DISCHARGE NOTE PROVIDER - PROVIDER TOKENS
PROVIDER:[TOKEN:[[id number]],FOLLOWUP:[1 week]],PROVIDER:[TOKEN:[[id number]],FOLLOWUP:[1 week]] PROVIDER:[TOKEN:[[id number]],FOLLOWUP:[1 week]],PROVIDER:[TOKEN:[[id number]],FOLLOWUP:[1 week]],PROVIDER:[TOKEN:[[id number]],FOLLOWUP:[2 weeks]]

## 2024-04-04 NOTE — DISCHARGE NOTE PROVIDER - NSDCMRMEDTOKEN_GEN_ALL_CORE_FT
buPROPion 150 mg/24 hours (XL) oral tablet, extended release: 1 tab(s) orally once a day ***patient states that he cannot remember the name of this drug but if it was filled then he is taking it as directed***  clonazePAM 1 mg oral tablet: 1 tab(s) orally 4 times a day  dorzolamide-timolol 2.23%-0.68% (2%-0.5% base) ophthalmic solution: 1 drop(s) to each affected eye 2 times a day ***patient states that he cannot remember the name of this drug but if it was filled then he is taking it as directed***  Flomax 0.4 mg oral capsule: 1 cap(s) orally once a day (at bedtime)  latanoprost 0.005% ophthalmic solution: 1 drop(s) to each affected eye once a day (at bedtime) ***patient states that he cannot remember the name of this drug but if it was filled then he is taking it as directed***  pregabalin 50 mg oral capsule: 1 cap(s) orally 2 times a day as needed for pain ***patient states that he cannot remember the name of this drug but if it was filled then he is taking it as directed***  Protonix 40 mg oral delayed release tablet: 1 tab(s) orally 2 times a day  sertraline 100 mg oral tablet: 1 tab(s) orally once a day  zonisamide 100 mg oral capsule: 2 cap(s) orally once a day (at bedtime) after you finish the 7 days of 150mg  dose  zonisamide 100 mg oral capsule: 1 cap(s) orally once a day (at bedtime) after you finish 7 days on 50 mg  zonisamide 100 mg oral capsule: 1 cap(s) orally once a day (at bedtime) take with the 50 mg capsule for a total of 150 mg after you finish the 7 days of 100 mg  zonisamide 50 mg oral capsule: 1 cap(s) orally once a day (at bedtime) take with the 100mg for a total of 150 mg for 7 days , after you finish the 100mg dose  zonisamide 50 mg oral capsule: 1 cap(s) orally once a day (at bedtime)

## 2024-04-04 NOTE — PROGRESS NOTE ADULT - SUBJECTIVE AND OBJECTIVE BOX
HPI:  74 y/o M w/ PMH of anxiety, PTSD, CVA, duodenal ulcer, gastritis, chronic back pain, HTN, p/w syncope. Denies incontinence / tongue bite. Patient states he has been having episodes of light headedness for the last few weeks. No vertigo. Denies CP, palpitations, PHILLIPS.  Reviewing the old chart, there were several admission in June/ september 2014, for syncope, witnessed seizure activity. MR head + for right WILIAM infarct. An EEG is mentioned to have showed right sided seizure activity. was placed on dilantin for a time. then switched to clonazepam, also for anxiety. Was in  [facility] 2022 for syncope,  EEG then showed generalized slowing.       MEDICATIONS  (STANDING):  buPROPion XL (24-Hour) . 150 milliGRAM(s) Oral daily  clonazePAM  Tablet 1 milliGRAM(s) Oral four times a day  dorzolamide 2%/timolol 0.5% Ophthalmic Solution 1 Drop(s) Both EYES two times a day  influenza  Vaccine (HIGH DOSE) 0.7 milliLiter(s) IntraMuscular once  latanoprost 0.005% Ophthalmic Solution 1 Drop(s) Both EYES at bedtime  pantoprazole    Tablet 40 milliGRAM(s) Oral before breakfast  sertraline 100 milliGRAM(s) Oral daily    MEDICATIONS  (PRN):  oxymetazoline 0.05% Nasal Spray 2 Spray(s) Both Nostrils two times a day PRN Nasal Congestion  pregabalin 50 milliGRAM(s) Oral two times a day PRN pain      Vital Signs Last 24 Hrs  T(C): 37.1 (04 Apr 2024 10:22), Max: 37.2 (03 Apr 2024 15:41)  T(F): 98.7 (04 Apr 2024 10:22), Max: 98.9 (03 Apr 2024 15:41)  HR: 94 (04 Apr 2024 10:22) (94 - 106)  BP: 128/74 (04 Apr 2024 10:22) (122/71 - 144/79)  RR: 18 (04 Apr 2024 10:22) (17 - 18)  SpO2: 96% (04 Apr 2024 10:22) (96% - 98%)    Parameters below as of 04 Apr 2024 10:22  Patient On (Oxygen Delivery Method): room air      Neurological Exam:    HF: Patient is alert and oriented x 3. There is no aphasia or dysarthria. Follows commands.   CN:  Pupils are equal and reactive. Extra ocular muscles are intact. There is no facial droop or asymmetry. Tongue is midline. Sensation is intact in the face. Other CN II-XII are intact.   Motor: motor examination all muscles are 5/5 and there is no pronator drift.   Sensory: intact to touch.   DTR: 1-2/4 all 4 extremities. Babinski is negative bilateral.  Co-ord:  Finger to finger to nose is intact.

## 2024-04-04 NOTE — DISCHARGE NOTE PROVIDER - HOSPITAL COURSE
74 y/o M w/ PMH of anxiety, PTSD, CVA, duodenal ulcer, gastritis, chronic back pain, HTN, seizure disorder, p/w syncope. Patient states that on Wednesday he came back to the house after walking his dog and felt dizziness. States he woke up on the floor afterwards after having LOC. Denies incontinence / tongue bite. Patient states he has been having episodes of light headedness for the last few weeks. Patient also has been noticing black stools for the last 3 weeks as well. Denies hematochezia. Patient states he has taken NSAIDs up to 4 times per day up until 10 days ago.                           10.6   9.08  )-----------( 473      ( 04 Apr 2024 07:12 )             32.4     04-03    131<L>  |  101  |  9   ----------------------------<  97  4.3   |  25  |  0.80    Ca    9.7      03 Apr 2024 07:23    TPro  7.0  /  Alb  2.8<L>  /  TBili  0.4  /  DBili  x   /  AST  43<H>  /  ALT  70  /  AlkPhos  125<H>  04-03          *Melena w/ anemia and h/o Duodenal ulcer  refused egd, counseled  -Counselled on avoiding NSAIDs in the future     *Syncope   tele normal  per Luis Fernando start AED  recommended starting anti convulsant; zonisamide 50 mg po q hs, then increase q weekly by 50 mg till 200 mg po hs is reached.  No driving for 1 year  Once d/c can f/u with Neuro      *Distended Urinary bladder up to 900cc on CT  -Patient has urinated large amount of urine since coming to floor         *H/o anxiety / PTSD / CVA / chronic back pain / HTN  -C/w home meds and f/u outpatient for further management if conditions remain stable during hospitalization 72 y/o M w/ PMH of anxiety, PTSD, CVA, duodenal ulcer, gastritis, chronic back pain, HTN, seizure disorder, p/w syncope. Patient states that on Wednesday he came back to the house after walking his dog and felt dizziness. States he woke up on the floor afterwards after having LOC. Denies incontinence / tongue bite. Patient states he has been having episodes of light headedness for the last few weeks. Patient also has been noticing black stools for the last 3 weeks as well. Denies hematochezia. Patient states he has taken NSAIDs up to 4 times per day up until 10 days ago.                           10.6   9.08  )-----------( 473      ( 04 Apr 2024 07:12 )             32.4     04-03    131<L>  |  101  |  9   ----------------------------<  97  4.3   |  25  |  0.80    Ca    9.7      03 Apr 2024 07:23    TPro  7.0  /  Alb  2.8<L>  /  TBili  0.4  /  DBili  x   /  AST  43<H>  /  ALT  70  /  AlkPhos  125<H>  04-03          *Melena w/ anemia and h/o Duodenal ulcer  refused egd, counseled  -Counselled on avoiding NSAIDs in the future     *Syncope   tele normal  per Luis Fernando start AED  recommended starting anti convulsant; zonisamide 50 mg po q hs, then increase q weekly by 50 mg till 200 mg po hs is reached.  No driving for 1 year  Once d/c can f/u with Neuro      *Distended Urinary bladder up to 900cc on CT  -Patient has urinated large amount of urine since coming to floor         *H/o anxiety / PTSD / CVA / chronic back pain / HTN  -C/w home meds and f/u outpatient for further management if conditions remain stable during hospitalization     PCP Dr Cunningham- ca message with service; Dr left for the day

## 2024-04-04 NOTE — PROGRESS NOTE ADULT - ASSESSMENT
73 yr old man with prior CVA, hx of focal seizures with 2nd generalization.  Presents with recurrent LOC. Non focal exam. CT head shows no acute changes.   Overnight video EEG, shows no epileptic, seizure activities. Possible recurrent seizure.  Suggest:  recommended starting anti convulsant; zonisamide 50 mg po q hs, then increase q weekly by 50 mg till 200 mg po hs is reached.  No driving for 1 year  Once d/c can f/u with my office

## 2024-04-04 NOTE — DISCHARGE NOTE PROVIDER - NSDCFUADDAPPT_GEN_ALL_CORE_FT
APPTS ARE READY TO BE MADE: [X] YES    Best Family or Patient Contact (if needed):    Additional Information about above appointments (if needed):    1:  2:  3:

## 2024-04-04 NOTE — DISCHARGE NOTE PROVIDER - NSDCCPCAREPLAN_GEN_ALL_CORE_FT
PRINCIPAL DISCHARGE DIAGNOSIS  Diagnosis: Melena  Assessment and Plan of Treatment: don't abuse NSAID, take protonix and f/up with your GI doc; new anti sezure meds loading 50 mg increase each week unti you are at 200mg/day, f/up with Neuro Dr Dorsey; Flomax was prescribed for urinary retention pls see urologist      SECONDARY DISCHARGE DIAGNOSES  Diagnosis: Fall down stairs, initial encounter  Assessment and Plan of Treatment:     Diagnosis: Head injury  Assessment and Plan of Treatment:     Diagnosis: Hyponatremia  Assessment and Plan of Treatment:

## 2024-04-04 NOTE — DISCHARGE NOTE PROVIDER - CARE PROVIDER_API CALL
Chris Dorsey  Neurology  [address] [place] [address], [address]  [place] [address]  Phone: [phone]  Fax: [phone]  Follow Up Time: 1 week    Wilfredo Cunningham  Internal Medicine  [address] [place] [address], [address]  [place] [address]  Phone: [phone]  Fax: [phone]  Follow Up Time: 1 week   Chris Dorsey  Neurology  [address] [facility], [address]  [place] [address]  Phone: [phone]  Fax: [phone]  Follow Up Time: 1 week    Wilfredo Cunningham  Internal Medicine  [address] [place] [address], [address]  [place] [address]  Phone: [phone]  Fax: [phone]  Follow Up Time: 1 week    Harman Arevalo  Urology  [address] [facility], [address]  [place] [address]  Phone: [phone]  Fax: [phone]  Follow Up Time: 2 weeks

## 2024-04-05 ENCOUNTER — TRANSCRIPTION ENCOUNTER (OUTPATIENT)
Age: 74
End: 2024-04-05

## 2024-04-05 PROCEDURE — 99232 SBSQ HOSP IP/OBS MODERATE 35: CPT

## 2024-04-05 RX ADMIN — Medication 1 MILLIGRAM(S): at 21:04

## 2024-04-05 RX ADMIN — LATANOPROST 1 DROP(S): 0.05 SOLUTION/ DROPS OPHTHALMIC; TOPICAL at 21:04

## 2024-04-05 RX ADMIN — BUPROPION HYDROCHLORIDE 150 MILLIGRAM(S): 150 TABLET, EXTENDED RELEASE ORAL at 09:11

## 2024-04-05 RX ADMIN — Medication 1 MILLIGRAM(S): at 13:11

## 2024-04-05 RX ADMIN — SERTRALINE 100 MILLIGRAM(S): 25 TABLET, FILM COATED ORAL at 09:18

## 2024-04-05 RX ADMIN — DORZOLAMIDE HYDROCHLORIDE TIMOLOL MALEATE 1 DROP(S): 20; 5 SOLUTION/ DROPS OPHTHALMIC at 09:12

## 2024-04-05 RX ADMIN — Medication 1 MILLIGRAM(S): at 18:50

## 2024-04-05 RX ADMIN — PANTOPRAZOLE SODIUM 40 MILLIGRAM(S): 20 TABLET, DELAYED RELEASE ORAL at 05:18

## 2024-04-05 RX ADMIN — Medication 1 MILLIGRAM(S): at 05:18

## 2024-04-05 RX ADMIN — DORZOLAMIDE HYDROCHLORIDE TIMOLOL MALEATE 1 DROP(S): 20; 5 SOLUTION/ DROPS OPHTHALMIC at 21:04

## 2024-04-05 RX ADMIN — Medication 50 MILLIGRAM(S): at 21:04

## 2024-04-05 NOTE — DISCHARGE NOTE NURSING/CASE MANAGEMENT/SOCIAL WORK - PATIENT PORTAL LINK FT
David Shaffer, pt's physical therapist, would like to update Dr. Garcia Monday on patient - change of residence, discharge from [place], etc. You can access the FollowMyHealth Patient Portal offered by [facility] by registering at the following website: [email or web address]. By joining [facility]’s FollowMyHealth portal, you will also be able to view your health information using other applications (apps) compatible with our system.

## 2024-04-05 NOTE — PROGRESS NOTE ADULT - SUBJECTIVE AND OBJECTIVE BOX
CHIEF COMPLAINT/INTERVAL HISTORY:    Patient is a 73y old  Male who presents with a chief complaint of syncope (04 Apr 2024 13:39)      HPI:  74 y/o M w/ PMH of anxiety, PTSD, CVA, duodenal ulcer, gastritis, chronic back pain, HTN, seizure disorder, p/w syncope. Patient states that on Wednesday he came back to the house after walking his dog and felt dizziness. States he woke up on the floor afterwards after having LOC. Denies incontinence / tongue bite. Patient states he has been having episodes of light headedness for the last few weeks. Patient also has been noticing black stools for the last 3 weeks as well. Denies hematochezia. Patient states he has taken NSAIDs up to 4 times per day up until 10 days ago.     Tele neg  on new AED  marked urinary retention    ICU Vital Signs Last 24 Hrs  T(C): 37.2 (04 Apr 2024 23:41), Max: 37.2 (04 Apr 2024 23:41)  T(F): 99 (04 Apr 2024 23:41), Max: 99 (04 Apr 2024 23:41)  HR: 83 (04 Apr 2024 23:41) (83 - 98)  BP: 120/57 (04 Apr 2024 23:41) (120/57 - 128/74)  BP(mean): --  ABP: --  ABP(mean): --  RR: 18 (04 Apr 2024 23:41) (18 - 18)  SpO2: 97% (04 Apr 2024 23:41) (96% - 97%)    O2 Parameters below as of 04 Apr 2024 14:58  Patient On (Oxygen Delivery Method): room air              MEDICATIONS  (STANDING):  buPROPion XL (24-Hour) . 150 milliGRAM(s) Oral daily  clonazePAM  Tablet 1 milliGRAM(s) Oral four times a day  dorzolamide 2%/timolol 0.5% Ophthalmic Solution 1 Drop(s) Both EYES two times a day  influenza  Vaccine (HIGH DOSE) 0.7 milliLiter(s) IntraMuscular once  latanoprost 0.005% Ophthalmic Solution 1 Drop(s) Both EYES at bedtime  pantoprazole    Tablet 40 milliGRAM(s) Oral before breakfast  sertraline 100 milliGRAM(s) Oral daily  zonisamide 50 milliGRAM(s) Oral at bedtime    MEDICATIONS  (PRN):  oxymetazoline 0.05% Nasal Spray 2 Spray(s) Both Nostrils two times a day PRN Nasal Congestion  pregabalin 50 milliGRAM(s) Oral two times a day PRN pain        PHYSICAL EXAM:      NERVOUS SYSTEM:  Alert & Oriented X3, Motor Strength 5/5 B/L upper and lower extremities; DTRs 2+ intact and symmetric  CHEST/LUNG: Clear to auscultation bilaterally; No rales, rhonchi, wheezing, or rubs  HEART: Regular rate and rhythm; No murmurs, rubs, or gallops  ABDOMEN: Soft, Nontender, Nondistended; Bowel sounds present  EXTREMITIES:  2+ Peripheral Pulses, No clubbing, cyanosis, or edema    LABS:                        10.6   9.08  )-----------( 473      ( 04 Apr 2024 07:12 )             32.4           RADIOLOGY & ADDITIONAL TESTS: personally reviewed        *Melena w/ anemia and h/o Duodenal ulcer  refused egd, counseled  -Counselled on avoiding NSAIDs in the future     *Syncope   tele normal  per Luis Fernando start AED  recommended starting anti convulsant; zonisamide 50 mg po q hs, then increase q weekly by 50 mg till 200 mg po hs is reached.  No driving for 1 year  Once d/c can f/u with Neuro      *Distended Urinary bladder up to 900cc on CT  -Patient has urinated large amount of urine since coming to floor but retians Gann inserted dc cancelled on 4/4  flomax added        *H/o anxiety / PTSD / CVA / chronic back pain / HTN  -C/w home meds and f/u outpatient for further management if conditions remain stable during hospitalization       needs rehab not safe for dc home CHIEF COMPLAINT/INTERVAL HISTORY:    Patient is a 73y old  Male who presents with a chief complaint of syncope (04 Apr 2024 13:39)      HPI:  72 y/o M w/ PMH of anxiety, PTSD, CVA, duodenal ulcer, gastritis, chronic back pain, HTN, seizure disorder, p/w syncope. Patient states that on Wednesday he came back to the house after walking his dog and felt dizziness. States he woke up on the floor afterwards after having LOC. Denies incontinence / tongue bite. Patient states he has been having episodes of light headedness for the last few weeks. Patient also has been noticing black stools for the last 3 weeks as well. Denies hematochezia. Patient states he has taken NSAIDs up to 4 times per day up until 10 days ago.     Tele neg  on new AED  marked urinary retention    ICU Vital Signs Last 24 Hrs  T(C): 37.2 (04 Apr 2024 23:41), Max: 37.2 (04 Apr 2024 23:41)  T(F): 99 (04 Apr 2024 23:41), Max: 99 (04 Apr 2024 23:41)  HR: 83 (04 Apr 2024 23:41) (83 - 98)  BP: 120/57 (04 Apr 2024 23:41) (120/57 - 128/74)  BP(mean): --  ABP: --  ABP(mean): --  RR: 18 (04 Apr 2024 23:41) (18 - 18)  SpO2: 97% (04 Apr 2024 23:41) (96% - 97%)    O2 Parameters below as of 04 Apr 2024 14:58  Patient On (Oxygen Delivery Method): room air              MEDICATIONS  (STANDING):  buPROPion XL (24-Hour) . 150 milliGRAM(s) Oral daily  clonazePAM  Tablet 1 milliGRAM(s) Oral four times a day  dorzolamide 2%/timolol 0.5% Ophthalmic Solution 1 Drop(s) Both EYES two times a day  influenza  Vaccine (HIGH DOSE) 0.7 milliLiter(s) IntraMuscular once  latanoprost 0.005% Ophthalmic Solution 1 Drop(s) Both EYES at bedtime  pantoprazole    Tablet 40 milliGRAM(s) Oral before breakfast  sertraline 100 milliGRAM(s) Oral daily  zonisamide 50 milliGRAM(s) Oral at bedtime    MEDICATIONS  (PRN):  oxymetazoline 0.05% Nasal Spray 2 Spray(s) Both Nostrils two times a day PRN Nasal Congestion  pregabalin 50 milliGRAM(s) Oral two times a day PRN pain        PHYSICAL EXAM:      NERVOUS SYSTEM:  Alert & Oriented X3, Motor Strength 5/5 B/L upper and lower extremities; DTRs 2+ intact and symmetric  CHEST/LUNG: Clear to auscultation bilaterally; No rales, rhonchi, wheezing, or rubs  HEART: Regular rate and rhythm; No murmurs, rubs, or gallops  ABDOMEN: Soft, Nontender, Nondistended; Bowel sounds present  EXTREMITIES:  2+ Peripheral Pulses, No clubbing, cyanosis, or edema    LABS:                        10.6   9.08  )-----------( 473      ( 04 Apr 2024 07:12 )             32.4           RADIOLOGY & ADDITIONAL TESTS: personally reviewed        *Melena w/ anemia and h/o Duodenal ulcer  refused egd, counseled  -Counselled on avoiding NSAIDs in the future     *Syncope   tele normal  per Luis Fernando start AED  recommended starting anti convulsant; zonisamide 50 mg po q hs, then increase q weekly by 50 mg till 200 mg po hs is reached.  No driving for 1 year  Once d/c can f/u with Neuro      *Distended Urinary bladder up to 900cc on CT  -Patient has urinated large amount of urine since coming to floor but retians Gann inserted dc cancelled on 4/4  flomax added        *H/o anxiety / PTSD / CVA / chronic back pain / HTN  -C/w home meds and f/u outpatient for further management if conditions remain stable during hospitalization       needs rehab not safe for dc home but he refused Rehab, aware of risks; dc with VNS and appt with Uro

## 2024-04-06 VITALS
HEART RATE: 87 BPM | TEMPERATURE: 98 F | SYSTOLIC BLOOD PRESSURE: 125 MMHG | DIASTOLIC BLOOD PRESSURE: 76 MMHG | OXYGEN SATURATION: 99 %

## 2024-04-06 RX ADMIN — PANTOPRAZOLE SODIUM 40 MILLIGRAM(S): 20 TABLET, DELAYED RELEASE ORAL at 05:06

## 2024-04-06 RX ADMIN — Medication 1 MILLIGRAM(S): at 05:06

## 2024-04-06 RX ADMIN — Medication 1 MILLIGRAM(S): at 12:05

## 2024-04-06 RX ADMIN — DORZOLAMIDE HYDROCHLORIDE TIMOLOL MALEATE 1 DROP(S): 20; 5 SOLUTION/ DROPS OPHTHALMIC at 12:29

## 2024-04-06 RX ADMIN — SERTRALINE 100 MILLIGRAM(S): 25 TABLET, FILM COATED ORAL at 09:04

## 2024-04-06 RX ADMIN — BUPROPION HYDROCHLORIDE 150 MILLIGRAM(S): 150 TABLET, EXTENDED RELEASE ORAL at 09:04

## 2024-04-10 NOTE — CHART NOTE - NSCHARTNOTEFT_GEN_A_CORE
Large vol  urinary retention; needs Gann; unsteady on his feet high risk falls, complications cancel dc
Left patient a voicemail with our call back number
Patient reports that he takes clonazepam 1mg 4x per day for Anxiety/PTSD. States that he feels anxious. Requests clonazepam. Ordered as prescribed. Confirmed prescription with Pharmacist who looked on [facility].
Patient was outreached but did not answer. A voicemail was left for the patient to return our call.
Patient was outreached but did not answer. A voicemail was left for the patient to return our call.

## 2024-04-12 DIAGNOSIS — F43.10 POST-TRAUMATIC STRESS DISORDER, UNSPECIFIED: ICD-10-CM

## 2024-04-12 DIAGNOSIS — M54.9 DORSALGIA, UNSPECIFIED: ICD-10-CM

## 2024-04-12 DIAGNOSIS — K92.1 MELENA: ICD-10-CM

## 2024-04-12 DIAGNOSIS — Z53.20 PROCEDURE AND TREATMENT NOT CARRIED OUT BECAUSE OF PATIENT'S DECISION FOR UNSPECIFIED REASONS: ICD-10-CM

## 2024-04-12 DIAGNOSIS — D64.9 ANEMIA, UNSPECIFIED: ICD-10-CM

## 2024-04-12 DIAGNOSIS — F32.A DEPRESSION, UNSPECIFIED: ICD-10-CM

## 2024-04-12 DIAGNOSIS — R33.9 RETENTION OF URINE, UNSPECIFIED: ICD-10-CM

## 2024-04-12 DIAGNOSIS — R26.0 ATAXIC GAIT: ICD-10-CM

## 2024-04-12 DIAGNOSIS — I10 ESSENTIAL (PRIMARY) HYPERTENSION: ICD-10-CM

## 2024-04-12 DIAGNOSIS — F41.9 ANXIETY DISORDER, UNSPECIFIED: ICD-10-CM

## 2024-04-12 DIAGNOSIS — F10.10 ALCOHOL ABUSE, UNCOMPLICATED: ICD-10-CM

## 2024-04-12 DIAGNOSIS — E87.1 HYPO-OSMOLALITY AND HYPONATREMIA: ICD-10-CM
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
## 2024-04-12 DIAGNOSIS — Z86.73 PERSONAL HISTORY OF TRANSIENT ISCHEMIC ATTACK (TIA), AND CEREBRAL INFARCTION WITHOUT RESIDUAL DEFICITS: ICD-10-CM

## 2024-04-12 DIAGNOSIS — G89.29 OTHER CHRONIC PAIN: ICD-10-CM

## 2024-04-12 DIAGNOSIS — G40.909 EPILEPSY, UNSPECIFIED, NOT INTRACTABLE, WITHOUT STATUS EPILEPTICUS: ICD-10-CM
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
## 2024-04-12 DIAGNOSIS — S06.9X9A UNSPECIFIED INTRACRANIAL INJURY WITH LOSS OF CONSCIOUSNESS OF UNSPECIFIED DURATION, INITIAL ENCOUNTER: ICD-10-CM

## 2024-05-15 NOTE — ED ADULT TRIAGE NOTE - MODE OF ARRIVAL
Right [facility] Filler Volume In Cc: 0 Map Statement: See Attached Map for Complete Details. Show Right And Left Cheek Volume?: No Show First Additional Location?: Yes Post-Care Instructions: Patient instructed to apply ice to reduce swelling. Vials Reconstituted (Required For Inventory): 2 Additional Area 3 Location: FULL FACE Auto, assisted by ED Lot #: [id number] Treatment Number: 3 Detail Level: Detailed Anesthesia Type: 1% lidocaine without epinephrine Additional Area 1 Volume In Cc: 18 Dilution Method: The Sculptra was diluted with 5 ml of sterile water for a total volume of 9ccs for each vial. Expiration Date (Month Year): 07/03/26 Consent: Written consent obtained. Risks include but not limited to bruising, beading, irregular texture, ulceration, infection, allergic reaction, scar formation, incomplete augmentation, temporary nature, procedural pain. Injection Technique: The Sculptra was injected to the listed areas after cleansing the skin and providing appropriate anesthesia. Anesthesia Volume In Cc: 1

## 2024-06-11 ENCOUNTER — APPOINTMENT (OUTPATIENT)
Dept: UROLOGY | Facility: CLINIC | Age: 74
End: 2024-06-11
Payer: MEDICARE

## 2024-06-11 VITALS
DIASTOLIC BLOOD PRESSURE: 69 MMHG | OXYGEN SATURATION: 98 % | WEIGHT: 180 LBS | HEART RATE: 77 BPM | RESPIRATION RATE: 14 BRPM | HEIGHT: 69 IN | BODY MASS INDEX: 26.66 KG/M2 | TEMPERATURE: 97.6 F | SYSTOLIC BLOOD PRESSURE: 129 MMHG

## 2024-06-11 PROCEDURE — 99204 OFFICE O/P NEW MOD 45 MIN: CPT

## 2024-06-11 RX ORDER — BETHANECHOL CHLORIDE 25 MG/1
25 TABLET ORAL
Qty: 14 | Refills: 0 | Status: ACTIVE | COMMUNITY
Start: 2024-06-11 | End: 1900-01-01

## 2024-06-11 NOTE — PHYSICAL EXAM
[Normal Appearance] : normal appearance [Well Groomed] : well groomed [General Appearance - In No Acute Distress] : no acute distress [Edema] : no peripheral edema [Respiration, Rhythm And Depth] : normal respiratory rhythm and effort [Exaggerated Use Of Accessory Muscles For Inspiration] : no accessory muscle use [Abdomen Tenderness] : non-tender [Abdomen Soft] : soft [Costovertebral Angle Tenderness] : no ~M costovertebral angle tenderness [Urinary Bladder Findings] : the bladder was normal on palpation [Normal Station and Gait] : the gait and station were normal for the patient's age [] : no rash [No Focal Deficits] : no focal deficits [Affect] : the affect was normal [Oriented To Time, Place, And Person] : oriented to person, place, and time [Mood] : the mood was normal [No Palpable Adenopathy] : no palpable adenopathy [de-identified] : Moderate-sized prostate gland noted.

## 2024-06-11 NOTE — HISTORY OF PRESENT ILLNESS
[FreeTextEntry1] : VERNON FRY is a 73 year old male who presents with acute urinary retention. The patient experienced a fall down the stairs and was subsequently evaluated in the emergency room, where he was found to be in urinary retention. He denies any prior urinary tract symptoms or problems. Currently, he has an indwelling catheter in place.

## 2024-06-11 NOTE — ADDENDUM
[FreeTextEntry1] : I, Myla Kenyon, acted as a scribe on behalf of Dr. Cuellar 06/11/2024.   All medical record entries made by Myla Kenyon were at my, Dr.Kip Cuellar, direction and personally dictated by me on 06/11/2024. I have reviewed the chart and agree that the record accurately reflects my personal performance of the history, physical exam, assessment, and plan. I have also personally directed, reviewed, and agreed with the chart.

## 2024-06-11 NOTE — ASSESSMENT
[FreeTextEntry1] : Imp: BPH with bladder decompensation, of which patient was apparently unaware.

## 2024-06-11 NOTE — REVIEW OF SYSTEMS
[Negative] : Endocrine [Chills] : chills [Feeling Tired] : feeling tired [Eyesight Problems] : eyesight problems [Dry Eyes] : dryness of the eyes [Earache] : earache [Shortness Of Breath] : shortness of breath [Urine retention] : urine retention [Wait a long time to urinate] : waits a long time to urinate [Leakage of urine with urgency] : leakage of urine with urgency [Joint Pain] : joint pain [Joint Swelling] : joint swelling [Skin Lesions] : skin lesion [Depression] : depression [Easy Bleeding] : a tendency for easy bleeding [Easy Bruising] : a tendency for easy bruising [FreeTextEntry2] : frequent urination- 6-8

## 2024-06-11 NOTE — END OF VISIT
[FreeTextEntry3] : Recommendations: Pt will start Bethanechol Chloride 25 MG Oral Tablet; Take 1 tablet twice daily and Tamsulosin. He will follow up in one week with a transrectal ultrasound of the prostate gland followed by a aerodynamic study. Further plans to follow.

## 2024-06-12 LAB — PSA SERPL-MCNC: 1.74 NG/ML

## 2024-06-18 ENCOUNTER — APPOINTMENT (OUTPATIENT)
Dept: UROLOGY | Facility: CLINIC | Age: 74
End: 2024-06-18

## 2024-07-01 ENCOUNTER — APPOINTMENT (OUTPATIENT)
Dept: UROLOGY | Facility: CLINIC | Age: 74
End: 2024-07-01

## 2024-07-01 VITALS
TEMPERATURE: 97.5 F | SYSTOLIC BLOOD PRESSURE: 114 MMHG | HEART RATE: 56 BPM | WEIGHT: 185 LBS | BODY MASS INDEX: 28.04 KG/M2 | HEIGHT: 68 IN | OXYGEN SATURATION: 98 % | DIASTOLIC BLOOD PRESSURE: 70 MMHG | RESPIRATION RATE: 14 BRPM

## 2024-07-01 PROBLEM — R33.9 BLADDER RETENTION OF URINE: Status: ACTIVE | Noted: 2024-06-11

## 2024-07-01 PROCEDURE — 76872 US TRANSRECTAL: CPT

## 2024-07-11 ENCOUNTER — APPOINTMENT (OUTPATIENT)
Dept: UROLOGY | Facility: CLINIC | Age: 74
End: 2024-07-11
Payer: MEDICARE

## 2024-07-11 VITALS
HEART RATE: 74 BPM | DIASTOLIC BLOOD PRESSURE: 65 MMHG | BODY MASS INDEX: 28.79 KG/M2 | RESPIRATION RATE: 14 BRPM | WEIGHT: 190 LBS | HEIGHT: 68 IN | OXYGEN SATURATION: 96 % | TEMPERATURE: 97.4 F | SYSTOLIC BLOOD PRESSURE: 133 MMHG

## 2024-07-11 DIAGNOSIS — R33.9 RETENTION OF URINE, UNSPECIFIED: ICD-10-CM

## 2024-07-11 PROCEDURE — 51797 INTRAABDOMINAL PRESSURE TEST: CPT

## 2024-07-11 PROCEDURE — 51784 ANAL/URINARY MUSCLE STUDY: CPT

## 2024-07-11 PROCEDURE — 51729 CYSTOMETROGRAM W/VP&UP: CPT

## 2024-08-08 ENCOUNTER — APPOINTMENT (OUTPATIENT)
Dept: UROLOGY | Facility: CLINIC | Age: 74
End: 2024-08-08

## 2024-08-08 PROCEDURE — 51702 INSERT TEMP BLADDER CATH: CPT

## 2024-09-12 ENCOUNTER — APPOINTMENT (OUTPATIENT)
Dept: UROLOGY | Facility: CLINIC | Age: 74
End: 2024-09-12

## 2024-09-12 ENCOUNTER — APPOINTMENT (OUTPATIENT)
Dept: UROLOGY | Facility: CLINIC | Age: 74
End: 2024-09-12
Payer: MEDICARE

## 2024-09-12 DIAGNOSIS — R33.9 RETENTION OF URINE, UNSPECIFIED: ICD-10-CM

## 2024-09-12 PROCEDURE — 51702 INSERT TEMP BLADDER CATH: CPT

## 2024-10-15 ENCOUNTER — APPOINTMENT (OUTPATIENT)
Dept: UROLOGY | Facility: CLINIC | Age: 74
End: 2024-10-15
Payer: MEDICARE

## 2024-10-15 ENCOUNTER — APPOINTMENT (OUTPATIENT)
Dept: UROLOGY | Facility: CLINIC | Age: 74
End: 2024-10-15

## 2024-10-15 PROCEDURE — 99213 OFFICE O/P EST LOW 20 MIN: CPT

## 2024-10-15 RX ORDER — SULFAMETHOXAZOLE AND TRIMETHOPRIM 800; 160 MG/1; MG/1
800-160 TABLET ORAL TWICE DAILY
Qty: 14 | Refills: 0 | Status: ACTIVE | COMMUNITY
Start: 2024-10-15 | End: 1900-01-01

## 2024-10-15 RX ORDER — TAMSULOSIN HYDROCHLORIDE 0.4 MG/1
0.4 CAPSULE ORAL
Qty: 180 | Refills: 3 | Status: ACTIVE | COMMUNITY
Start: 2024-10-15 | End: 1900-01-01

## 2024-10-24 ENCOUNTER — APPOINTMENT (OUTPATIENT)
Dept: UROLOGY | Facility: CLINIC | Age: 74
End: 2024-10-24
Payer: MEDICARE

## 2024-10-24 DIAGNOSIS — R33.9 RETENTION OF URINE, UNSPECIFIED: ICD-10-CM

## 2024-10-24 PROCEDURE — 99213 OFFICE O/P EST LOW 20 MIN: CPT

## 2024-10-24 RX ORDER — BETHANECHOL CHLORIDE 50 MG/1
50 TABLET ORAL
Qty: 180 | Refills: 3 | Status: ACTIVE | COMMUNITY
Start: 2024-10-15 | End: 1900-01-01

## 2024-11-06 NOTE — ED ADULT TRIAGE NOTE - CHIEF COMPLAINT QUOTE
Pt states she is calling regarding Urine results from appt with Dr. Kline on Monday.  States that she was told urine was needed to rule out pre eclampsia.  Do not see results in pt's chart.  Apologized for inconvenience.  Pt states this is the second time she was not given testing/results when advised she would be.  Apologized again and advised would report message to supervisor as well as Dr. Kline's office.  Patient verbalized understanding.      Message to Dr. Kline's clinical pool.      
Spoke with patient regarding POCT UA. Reviewed why it may done, where results show and if patient are called with results.  All questions were answered. Patent grateful for information.  
chest tightness sent by Dr Bush abnormal ekg and Labs

## 2024-11-22 NOTE — ED ADULT NURSE NOTE - NSFALLRSKUNASSIST_ED_ALL_ED
Date: 2024  OR Location: [facility]    Name: Malathi Castellanos, : 1965, Age: 59 y.o., MRN: [id number], Sex: female    Diagnosis  Pre-op Diagnosis      * Bariatric surgery status [Z98.84] Post-op Diagnosis     * Bariatric surgery status [Z98.84]     Procedures  LAPAROSCOPIC EXPLORATION/ LYSIS OF ADHESIONS/ SMALL BOWEL RESECTION WITH ANASTOMOSIS (REVISION OF JEJUNOSTOMY) EGD/ TAPBLOCK  57783 - ME LAPS ABD PRTM&OMENTUM DX W/WO SPEC BR/WA SPX    LAPAROSCOPIC EXPLORATION/ LYSIS OF ADHESIONS/ SMALL BOWEL RESECTION WITH ANASTOMOSIS (REVISION OF JEJUNOSTOMY) EGD/ TAPBLOCK  06680 - ME UNLISTED LAPAROSCOPY PROCEDURE STOMACH    ME EGD TRANSORAL BIOPSY SINGLE/MULTIPLE [51843]  Surgeons      * Leatha Hamilton - Primary    Resident/Fellow/Other Assistant:  Surgeons and Role:  * No surgeons found with a matching role *    Staff:   Circulator: Lori  Surgical Assistant: Evans Butcherub Person: Malathi Vanegas Circulator: Shawnee Vanegas Scrub: Reza Butcherub Person: Traci Butcherub Person: Bridgette    Anesthesia Staff: Anesthesiologist: Jude Amos MD; Bola Solorio MD  CRNA: MADHU Martinez-CRNA    Procedure Summary  Anesthesia: General  ASA: II    Estimated Blood Loss: 25 mL    Intra-op Medications:   Administrations occurring from 1700 to  on 24:   Medication Name Total Dose   lactated Ringer's infusion Cannot be calculated   ePHEDrine injection 15 mg   ondansetron (Zofran) 2 mg/mL injection 4 mg   rocuronium (ZeMuron) 50 mg/5 mL injection 20 mg   NaCl 0.9 % bolus Cannot be calculated   sugammadex (Bridion) 200 mg/2 mL injection 200 mg              Anesthesia Record               Intraprocedure I/O Totals          Intake    NaCl 0.9 % bolus 400.00 mL    lactated Ringer's infusion 1000.00 mL    Total Intake 1400 mL          Specimen:   ID Type Source Tests Collected by Time   1 : small bowel anastomosis Tissue SMALL BOWEL /INTESTINE SEGMENTAL RESECTION SURGICAL PATHOLOGY EXAM Leatha Hamilton MD 2024 0588       Findings: White patulous jejunojejunal anastomosis.  Jejunojejunal anastomosis resected with biliopancreatic limb lengthened.  No complications    Complications:  None; patient tolerated the procedure well.     Disposition: PACU - hemodynamically stable.  Condition: stable  Specimens Collected:   ID Type Source Tests Collected by Time   1 : small bowel anastomosis Tissue SMALL BOWEL /INTESTINE SEGMENTAL RESECTION SURGICAL PATHOLOGY EXAM Leatha Hamilton MD 11/20/2024 8104     Attending Attestation: I was present and scrubbed for the entire procedure.    Leatha Hamilton  Phone Number: [phone]   no

## 2024-12-24 PROBLEM — F10.90 ALCOHOL USE: Status: ACTIVE | Noted: 2018-11-01

## 2025-01-02 ENCOUNTER — APPOINTMENT (OUTPATIENT)
Dept: FAMILY MEDICINE | Facility: CLINIC | Age: 75
End: 2025-01-02

## 2025-01-02 DIAGNOSIS — I10 ESSENTIAL (PRIMARY) HYPERTENSION: ICD-10-CM

## 2025-01-02 DIAGNOSIS — I63.421 CEREBRAL INFARCTION DUE TO EMBOLISM OF RIGHT ANTERIOR CEREBRAL ARTERY: ICD-10-CM

## 2025-01-02 DIAGNOSIS — Z00.00 ENCOUNTER FOR GENERAL ADULT MEDICAL EXAMINATION W/OUT ABNORMAL FINDINGS: ICD-10-CM

## 2025-01-02 DIAGNOSIS — R33.9 RETENTION OF URINE, UNSPECIFIED: ICD-10-CM

## 2025-01-02 DIAGNOSIS — E78.5 HYPERLIPIDEMIA, UNSPECIFIED: ICD-10-CM

## 2025-04-03 NOTE — ED ADULT TRIAGE NOTE - CCCP TRG CHIEF CMPLNT
chest discomfort
Consent Text: Verbal consent obtained from patient / guardian?: \\Gilda, the patient, have initiated and requested a telehealth visit. You, as the patient (or in the case of a minor, the parent/legal guardian), are verbally consenting to treatment. You also understand that third-party applications potentially introduce privacy risks, however, we have taken precautions and measures to protect your privacy whenever possible. You also authorize that any photos and screen shots taken during this visit will be transferred and uploaded into your permanent medical record with our practice.

## 2025-07-16 ENCOUNTER — INPATIENT (INPATIENT)
Facility: HOSPITAL | Age: 75
LOS: 1 days | Discharge: ANOTHER HOSPITAL | DRG: 312 | End: 2025-07-18
Attending: STUDENT IN AN ORGANIZED HEALTH CARE EDUCATION/TRAINING PROGRAM | Admitting: INTERNAL MEDICINE
Payer: MEDICARE

## 2025-07-16 VITALS
RESPIRATION RATE: 18 BRPM | OXYGEN SATURATION: 99 % | SYSTOLIC BLOOD PRESSURE: 157 MMHG | WEIGHT: 173.94 LBS | HEART RATE: 66 BPM | DIASTOLIC BLOOD PRESSURE: 80 MMHG | TEMPERATURE: 98 F

## 2025-07-16 LAB
ALBUMIN SERPL ELPH-MCNC: 4 G/DL — SIGNIFICANT CHANGE UP (ref 3.3–5)
ALP SERPL-CCNC: 83 U/L — SIGNIFICANT CHANGE UP (ref 40–120)
ALT FLD-CCNC: 21 U/L — SIGNIFICANT CHANGE UP (ref 12–78)
ANION GAP SERPL CALC-SCNC: 7 MMOL/L — SIGNIFICANT CHANGE UP (ref 5–17)
AST SERPL-CCNC: 15 U/L — SIGNIFICANT CHANGE UP (ref 15–37)
BASOPHILS # BLD AUTO: 0.01 K/UL — SIGNIFICANT CHANGE UP (ref 0–0.2)
BASOPHILS NFR BLD AUTO: 0.2 % — SIGNIFICANT CHANGE UP (ref 0–2)
BILIRUB SERPL-MCNC: 0.6 MG/DL — SIGNIFICANT CHANGE UP (ref 0.2–1.2)
BUN SERPL-MCNC: 17 MG/DL — SIGNIFICANT CHANGE UP (ref 7–23)
CALCIUM SERPL-MCNC: 10 MG/DL — SIGNIFICANT CHANGE UP (ref 8.5–10.1)
CHLORIDE SERPL-SCNC: 105 MMOL/L — SIGNIFICANT CHANGE UP (ref 96–108)
CO2 SERPL-SCNC: 23 MMOL/L — SIGNIFICANT CHANGE UP (ref 22–31)
CREAT SERPL-MCNC: 1.49 MG/DL — HIGH (ref 0.5–1.3)
EGFR: 50 ML/MIN/1.73M2 — LOW
EGFR: 50 ML/MIN/1.73M2 — LOW
EOSINOPHIL # BLD AUTO: 0.13 K/UL — SIGNIFICANT CHANGE UP (ref 0–0.5)
EOSINOPHIL NFR BLD AUTO: 3.2 % — SIGNIFICANT CHANGE UP (ref 0–6)
FLUAV AG NPH QL: SIGNIFICANT CHANGE UP
FLUBV AG NPH QL: SIGNIFICANT CHANGE UP
GLUCOSE SERPL-MCNC: 107 MG/DL — HIGH (ref 70–99)
HCT VFR BLD CALC: 36.3 % — LOW (ref 39–50)
HGB BLD-MCNC: 12.8 G/DL — LOW (ref 13–17)
IMM GRANULOCYTES # BLD AUTO: 0.01 K/UL — SIGNIFICANT CHANGE UP (ref 0–0.07)
IMM GRANULOCYTES NFR BLD AUTO: 0.2 % — SIGNIFICANT CHANGE UP (ref 0–0.9)
LYMPHOCYTES # BLD AUTO: 0.82 K/UL — LOW (ref 1–3.3)
LYMPHOCYTES NFR BLD AUTO: 20 % — SIGNIFICANT CHANGE UP (ref 13–44)
MCHC RBC-ENTMCNC: 31.8 PG — SIGNIFICANT CHANGE UP (ref 27–34)
MCHC RBC-ENTMCNC: 35.3 G/DL — SIGNIFICANT CHANGE UP (ref 32–36)
MCV RBC AUTO: 90.1 FL — SIGNIFICANT CHANGE UP (ref 80–100)
MONOCYTES # BLD AUTO: 0.46 K/UL — SIGNIFICANT CHANGE UP (ref 0–0.9)
MONOCYTES NFR BLD AUTO: 11.2 % — SIGNIFICANT CHANGE UP (ref 2–14)
NEUTROPHILS # BLD AUTO: 2.66 K/UL — SIGNIFICANT CHANGE UP (ref 1.8–7.4)
NEUTROPHILS NFR BLD AUTO: 65.2 % — SIGNIFICANT CHANGE UP (ref 43–77)
NRBC # BLD AUTO: 0 K/UL — SIGNIFICANT CHANGE UP (ref 0–0)
NRBC # FLD: 0 K/UL — SIGNIFICANT CHANGE UP (ref 0–0)
NRBC BLD AUTO-RTO: 0 /100 WBCS — SIGNIFICANT CHANGE UP (ref 0–0)
PLATELET # BLD AUTO: 223 K/UL — SIGNIFICANT CHANGE UP (ref 150–400)
PMV BLD: 9.3 FL — SIGNIFICANT CHANGE UP (ref 7–13)
POTASSIUM SERPL-MCNC: 4.2 MMOL/L — SIGNIFICANT CHANGE UP (ref 3.5–5.3)
POTASSIUM SERPL-SCNC: 4.2 MMOL/L — SIGNIFICANT CHANGE UP (ref 3.5–5.3)
PROT SERPL-MCNC: 7.2 GM/DL — SIGNIFICANT CHANGE UP (ref 6–8.3)
RBC # BLD: 4.03 M/UL — LOW (ref 4.2–5.8)
RBC # FLD: 12.1 % — SIGNIFICANT CHANGE UP (ref 10.3–14.5)
RSV RNA NPH QL NAA+NON-PROBE: SIGNIFICANT CHANGE UP
SARS-COV-2 RNA SPEC QL NAA+PROBE: SIGNIFICANT CHANGE UP
SODIUM SERPL-SCNC: 135 MMOL/L — SIGNIFICANT CHANGE UP (ref 135–145)
SOURCE RESPIRATORY: SIGNIFICANT CHANGE UP
TROPONIN I, HIGH SENSITIVITY RESULT: 7.58 NG/L — SIGNIFICANT CHANGE UP
WBC # BLD: 4.09 K/UL — SIGNIFICANT CHANGE UP (ref 3.8–10.5)
WBC # FLD AUTO: 4.09 K/UL — SIGNIFICANT CHANGE UP (ref 3.8–10.5)

## 2025-07-16 PROCEDURE — 84484 ASSAY OF TROPONIN QUANT: CPT

## 2025-07-16 PROCEDURE — 97163 PT EVAL HIGH COMPLEX 45 MIN: CPT | Mod: GP

## 2025-07-16 PROCEDURE — 93306 TTE W/DOPPLER COMPLETE: CPT

## 2025-07-16 PROCEDURE — 72125 CT NECK SPINE W/O DYE: CPT | Mod: 26

## 2025-07-16 PROCEDURE — 93356 MYOCRD STRAIN IMG SPCKL TRCK: CPT

## 2025-07-16 PROCEDURE — 70450 CT HEAD/BRAIN W/O DYE: CPT | Mod: 26

## 2025-07-16 PROCEDURE — 93010 ELECTROCARDIOGRAM REPORT: CPT

## 2025-07-16 PROCEDURE — 93880 EXTRACRANIAL BILAT STUDY: CPT

## 2025-07-16 PROCEDURE — 93880 EXTRACRANIAL BILAT STUDY: CPT | Mod: 26

## 2025-07-16 PROCEDURE — 99285 EMERGENCY DEPT VISIT HI MDM: CPT

## 2025-07-16 PROCEDURE — 80053 COMPREHEN METABOLIC PANEL: CPT

## 2025-07-16 PROCEDURE — 99222 1ST HOSP IP/OBS MODERATE 55: CPT

## 2025-07-16 PROCEDURE — 71045 X-RAY EXAM CHEST 1 VIEW: CPT | Mod: 26

## 2025-07-16 PROCEDURE — 36415 COLL VENOUS BLD VENIPUNCTURE: CPT

## 2025-07-16 PROCEDURE — 97116 GAIT TRAINING THERAPY: CPT | Mod: GP

## 2025-07-16 PROCEDURE — 85027 COMPLETE CBC AUTOMATED: CPT

## 2025-07-16 RX ORDER — TIZANIDINE 4 MG/1
4 TABLET ORAL THREE TIMES A DAY
Refills: 0 | Status: DISCONTINUED | OUTPATIENT
Start: 2025-07-16 | End: 2025-07-18

## 2025-07-16 RX ORDER — MELATONIN 5 MG
3 TABLET ORAL AT BEDTIME
Refills: 0 | Status: DISCONTINUED | OUTPATIENT
Start: 2025-07-16 | End: 2025-07-18

## 2025-07-16 RX ORDER — SERTRALINE 100 MG/1
50 TABLET, FILM COATED ORAL DAILY
Refills: 0 | Status: DISCONTINUED | OUTPATIENT
Start: 2025-07-16 | End: 2025-07-18

## 2025-07-16 RX ORDER — ALPRAZOLAM 0.5 MG
1 TABLET, EXTENDED RELEASE 24 HR ORAL THREE TIMES A DAY
Refills: 0 | Status: DISCONTINUED | OUTPATIENT
Start: 2025-07-16 | End: 2025-07-18

## 2025-07-16 RX ORDER — CLOPIDOGREL BISULFATE 75 MG/1
1 TABLET, FILM COATED ORAL
Refills: 0 | DISCHARGE

## 2025-07-16 RX ORDER — HEPARIN SODIUM 1000 [USP'U]/ML
5000 INJECTION INTRAVENOUS; SUBCUTANEOUS EVERY 12 HOURS
Refills: 0 | Status: DISCONTINUED | OUTPATIENT
Start: 2025-07-16 | End: 2025-07-18

## 2025-07-16 RX ORDER — ONDANSETRON HCL/PF 4 MG/2 ML
4 VIAL (ML) INJECTION EVERY 8 HOURS
Refills: 0 | Status: DISCONTINUED | OUTPATIENT
Start: 2025-07-16 | End: 2025-07-18

## 2025-07-16 RX ORDER — MAGNESIUM, ALUMINUM HYDROXIDE 200-200 MG
30 TABLET,CHEWABLE ORAL EVERY 4 HOURS
Refills: 0 | Status: DISCONTINUED | OUTPATIENT
Start: 2025-07-16 | End: 2025-07-18

## 2025-07-16 RX ORDER — ROSUVASTATIN CALCIUM 20 MG/1
10 TABLET, FILM COATED ORAL AT BEDTIME
Refills: 0 | Status: DISCONTINUED | OUTPATIENT
Start: 2025-07-16 | End: 2025-07-18

## 2025-07-16 RX ORDER — ALPRAZOLAM 0.5 MG
1 TABLET, EXTENDED RELEASE 24 HR ORAL
Refills: 0 | DISCHARGE

## 2025-07-16 RX ORDER — NETARSUDIL 0.2 MG/ML
1 SOLUTION/ DROPS OPHTHALMIC; TOPICAL
Refills: 0 | DISCHARGE

## 2025-07-16 RX ORDER — ACETAMINOPHEN 500 MG/5ML
650 LIQUID (ML) ORAL EVERY 6 HOURS
Refills: 0 | Status: DISCONTINUED | OUTPATIENT
Start: 2025-07-16 | End: 2025-07-18

## 2025-07-16 RX ORDER — ROSUVASTATIN CALCIUM 20 MG/1
1 TABLET, FILM COATED ORAL
Refills: 0 | DISCHARGE

## 2025-07-16 RX ORDER — TIZANIDINE 4 MG/1
1 TABLET ORAL
Refills: 0 | DISCHARGE

## 2025-07-16 RX ORDER — BRIMONIDINE TARTRATE 1.5 MG/ML
1 SOLUTION/ DROPS OPHTHALMIC
Refills: 0 | DISCHARGE

## 2025-07-16 RX ORDER — SERTRALINE 100 MG/1
1 TABLET, FILM COATED ORAL
Refills: 0 | DISCHARGE

## 2025-07-16 RX ADMIN — Medication 650 MILLIGRAM(S): at 22:58

## 2025-07-16 RX ADMIN — Medication 1 MILLIGRAM(S): at 15:59

## 2025-07-16 RX ADMIN — Medication 10 MILLIGRAM(S): at 19:55

## 2025-07-16 RX ADMIN — ROSUVASTATIN CALCIUM 10 MILLIGRAM(S): 20 TABLET, FILM COATED ORAL at 22:59

## 2025-07-16 RX ADMIN — Medication 75 MILLILITER(S): at 19:58

## 2025-07-16 RX ADMIN — Medication 3 MILLIGRAM(S): at 23:00

## 2025-07-16 RX ADMIN — Medication 1 MILLIGRAM(S): at 22:58

## 2025-07-16 NOTE — ED ADULT NURSE REASSESSMENT NOTE - NS ED NURSE REASSESS COMMENT FT1
report given by LUIS ALBERTO palacios, pt bp elevated (refer to flowsheet), pt to be given prn hydralazine, other vitals stable, no other complaints at this time. safety and comfort measures maintained

## 2025-07-16 NOTE — PATIENT PROFILE ADULT - FALL HARM RISK - HARM RISK INTERVENTIONS
Assistance with ambulation/Assistance OOB with selected safe patient handling equipment/Communicate Risk of Fall with Harm to all staff/Discuss with provider need for PT consult/Monitor gait and stability/Reinforce activity limits and safety measures with patient and family/Tailored Fall Risk Interventions/Visual Cue: Yellow wristband and red socks/Bed in lowest position, wheels locked, appropriate side rails in place/Call bell, personal items and telephone in reach/Instruct patient to call for assistance before getting out of bed or chair/Non-slip footwear when patient is out of bed/[place] to call system/Physically safe environment - no spills, clutter or unnecessary equipment/Purposeful Proactive Rounding/Room/bathroom lighting operational, light cord in reach

## 2025-07-16 NOTE — H&P ADULT - HISTORY OF PRESENT ILLNESS
Patient is a 74y old  Male who presents with a chief complaint of syncopal episode    HPI:  74yoM PMH anxiety, PTSD, CVA, Duodenal ulcer, HLD, Chronic back pain, HTN presents for syncopal episode. He states he was walking up the stairs when he passed out, and fell down 5 stairs backwards, hit head on the night stand. The patient denies any fevers, chills, chest pain, palpitations, SOB, abdominal pain, tonic clonic movements, tongue biting, or any N/V/D. Of note, the patient states he was scheduled for a possible procedure/stent in his neck (?Carotid Stenosis) at [facility] today. He is unsure of his doctors name or the actual procedure being performed.     In the ER, a CT Head and C- Spine revealed no acute findings. The patient will be admitted to the hospitalist group for further evaluation.      PAST MEDICAL & SURGICAL HISTORY:  Retinal arterial branch occlusion, right  vision loss right eye      Backache  spinal stenosis      CVA (cerebral infarction)  6/2014- small rt ant.cerebral artery subacute infarct      Depression      UTI (lower urinary tract infection)  mssa      Retention of urine  6/2014      Anemia      Seizure  6/2014- abnormal eeg      FTT (failure to thrive) in adult  6/2014      Hyponatremia  124 in June 2014      HTN (hypertension)      Alcohol abuse  hx of etoh abuse      No significant past surgical history        FAMILY HISTORY:  Family history unknown      Social History:      Allergies    Allergy Status Unknown    Intolerances        MEDICATIONS  (STANDING):  heparin   Injectable 5000 Unit(s) SubCutaneous every 12 hours  rosuvastatin 10 milliGRAM(s) Oral at bedtime  sertraline 50 milliGRAM(s) Oral daily  sodium chloride 0.9%. 1000 milliLiter(s) (75 mL/Hr) IV Continuous <Continuous>    MEDICATIONS  (PRN):  acetaminophen     Tablet .. 650 milliGRAM(s) Oral every 6 hours PRN Temp greater or equal to 38C (100.4F), Mild Pain (1 - 3)  ALPRAZolam 1 milliGRAM(s) Oral three times a day PRN anxiety  aluminum hydroxide/magnesium hydroxide/simethicone Suspension 30 milliLiter(s) Oral every 4 hours PRN Dyspepsia  melatonin 3 milliGRAM(s) Oral at bedtime PRN Insomnia  ondansetron Injectable 4 milliGRAM(s) IV Push every 8 hours PRN Nausea and/or Vomiting  tiZANidine 4 milliGRAM(s) Oral three times a day PRN Muscle Spasm      ROS:  10 Point Review of Systems negative unless otherwise specified in the HPI    PEx  T(C): 36.6 (07-16-25 @ 15:06), Max: 36.6 (07-16-25 @ 10:35)  HR: 73 (07-16-25 @ 15:06) (66 - 73)  BP: 207/81 (07-16-25 @ 15:06) (157/80 - 207/81)  RR: 20 (07-16-25 @ 15:06) (18 - 20)  SpO2: 100% (07-16-25 @ 15:06) (99% - 100%)  Wt(kg): --  General: no acute distress  HEENT: normocephalic, atraumatic     Heart: Normal S1, S2, no murmur or gallop   Lungs: CTA bilaterally, no wheezes, rhonchi, rales.     Abdomen:  Soft, NT/ND, normal bowel sounds   Extremities: No deformities, clubbing, cyanosis, or edema.  Neurologic: No focal neurological deficits                            12.8   4.09  )-----------( 223      ( 16 Jul 2025 11:10 )             36.3     07-16    135  |  105  |  17  ----------------------------<  107[H]  4.2   |  23  |  1.49[H]    Ca    10.0      16 Jul 2025 11:10    TPro  7.2  /  Alb  4.0  /  TBili  0.6  /  DBili  x   /  AST  15  /  ALT  21  /  AlkPhos  83  07-16    CAPILLARY BLOOD GLUCOSE      POCT Blood Glucose.: 109 mg/dL (16 Jul 2025 10:36)      Urinalysis Basic - ( 16 Jul 2025 11:10 )    Color: x / Appearance: x / SG: x / pH: x  Gluc: 107 mg/dL / Ketone: x  / Bili: x / Urobili: x   Blood: x / Protein: x / Nitrite: x   Leuk Esterase: x / RBC: x / WBC x   Sq Epi: x / Non Sq Epi: x / Bacteria: x

## 2025-07-16 NOTE — ED ADULT NURSE NOTE - NSFALLRISKINTERV_ED_ALL_ED

## 2025-07-16 NOTE — ED ADULT NURSE NOTE - OBJECTIVE STATEMENT
74y male presented to the ED with complaints of syncopal episode. Pt was scheduled for stent placement at [place], he was going up the stairs but got dizzy and fell back, hitting his right arm on the night table and head on the dog. Pt ambulates with walker at baseline. Pt endorses SOB, increased work of breathing noted. Pt placed on 2L nasal canula for comfort. SpO2 100%

## 2025-07-16 NOTE — H&P ADULT - ASSESSMENT
74yoM PMH anxiety, PTSD, CVA, Duodenal ulcer, HLD, Chronic back pain, HTN presents for syncopal episode. He states he was walking up the stairs when he passed out, and fell down 5 stairs backwards, hit head on the night stand.       #Syncopal Episode  In the ER, a CT Head and C- Spine revealed no acute findings. Of note, the patient states he was scheduled for a possible procedure/stent in his neck (?Carotid Stenosis) at [facility] today.  - Check U/S Carotids  - Check TTE  - Check Orthostatics  - Monitor on Telemetry  - [facility] consulted for further input    #Acute Kidney Injury   Serum Cr 1.49, baseline noted to be 1.0 last year  - IVF hydration  - Avoid Nephrotoxins  - Cont to monitor BMP    #Hx of CVA  #HLD  No focal neurological deficits appreciated in the ER  - Hold Plavix in case the patient does have significant Carotid Artery Stenosis requiring intervention  - Cont Statin     #Hx Anxiety/Depression  #Hx of PTSD  - Cont home meds    #DVT Ppx  - Heparin SC

## 2025-07-16 NOTE — ED PROVIDER NOTE - CLINICAL SUMMARY MEDICAL DECISION MAKING FREE TEXT BOX
Presentation concerning for syncope, cardiac arrhthmia, ACS. Plan for CT imaging, labs, EKG, cardiac monitor and likely admit.   EKG NSR, rate 66, normal intervals, no JEFF or STD (time 1034). CT head and neck no acute actionable findings. Labs unremarkable, trop WNL. CXR clear Presentation concerning for syncope, cardiac arrhthmia, ACS. Plan for CT imaging, labs, EKG, cardiac monitor and likely admit.   EKG NSR, rate 66, normal intervals, no JEFF or STD (time 1034). CT head and neck no acute actionable findings. Labs unremarkable, trop WNL. CXR clear  Pt cannot give any details regarding surgeons or procedure at [place] today  Admitted to Dr. Ac hospitalist attending for syncope

## 2025-07-16 NOTE — ED ADULT NURSE NOTE - CHIEF COMPLAINT QUOTE
Pt BIBEMS s/p syncopal episode from standing height. (+) HS (-) ac (+) LOC. Pt states he was planned for stent placement today at [place]. BEFAST (-) EKG completed in triage, NA activated in triage by MD naqvi.

## 2025-07-16 NOTE — ED ADULT NURSE NOTE - CHIEF COMPLAINT
The patient is a 74y Male complaining of syncope. How Severe Is Your Rash?: mild Is This A New Presentation, Or A Follow-Up?: Rash

## 2025-07-16 NOTE — ED PROVIDER NOTE - OBJECTIVE STATEMENT
74yoM PMH anxiety, PTSD, CVA, duodenal ulcer, HLD, chronic back pain, HTN presents for syncopal episode. States was walking up the stairs when passed out, fell down 5 stairs, hit head on the night stand. No HA, CP, SOB, NV, abd pain, fever, diarrhea, urinary symptoms. Pt states he was scheduled for [facility] for carotid endartectomy today at [place]. On ASA, no other AC

## 2025-07-16 NOTE — ED ADULT TRIAGE NOTE - CHIEF COMPLAINT QUOTE
Pt BIBEMS s/p syncopal episode from standing height. (+) HS (-) ac (+) LOC. Pt states he was planned for stent placement today at [place]. BEFAST (-) EKG completed in triage, NA activated in triage by MD naqvi.

## 2025-07-17 ENCOUNTER — RESULT REVIEW (OUTPATIENT)
Age: 75
End: 2025-07-17

## 2025-07-17 ENCOUNTER — TRANSCRIPTION ENCOUNTER (OUTPATIENT)
Age: 75
End: 2025-07-17

## 2025-07-17 LAB
ADD ON TEST-SPECIMEN IN LAB: SIGNIFICANT CHANGE UP
ALBUMIN SERPL ELPH-MCNC: 3.8 G/DL — SIGNIFICANT CHANGE UP (ref 3.3–5)
ALP SERPL-CCNC: 95 U/L — SIGNIFICANT CHANGE UP (ref 40–120)
ALT FLD-CCNC: 37 U/L — SIGNIFICANT CHANGE UP (ref 12–78)
ANION GAP SERPL CALC-SCNC: 6 MMOL/L — SIGNIFICANT CHANGE UP (ref 5–17)
AST SERPL-CCNC: 30 U/L — SIGNIFICANT CHANGE UP (ref 15–37)
BILIRUB SERPL-MCNC: 0.6 MG/DL — SIGNIFICANT CHANGE UP (ref 0.2–1.2)
BUN SERPL-MCNC: 17 MG/DL — SIGNIFICANT CHANGE UP (ref 7–23)
CALCIUM SERPL-MCNC: 9.9 MG/DL — SIGNIFICANT CHANGE UP (ref 8.5–10.1)
CHLORIDE SERPL-SCNC: 108 MMOL/L — SIGNIFICANT CHANGE UP (ref 96–108)
CO2 SERPL-SCNC: 22 MMOL/L — SIGNIFICANT CHANGE UP (ref 22–31)
CREAT SERPL-MCNC: 1.39 MG/DL — HIGH (ref 0.5–1.3)
EGFR: 54 ML/MIN/1.73M2 — LOW
EGFR: 54 ML/MIN/1.73M2 — LOW
GLUCOSE SERPL-MCNC: 107 MG/DL — HIGH (ref 70–99)
HCT VFR BLD CALC: 40.1 % — SIGNIFICANT CHANGE UP (ref 39–50)
HGB BLD-MCNC: 13.8 G/DL — SIGNIFICANT CHANGE UP (ref 13–17)
MCHC RBC-ENTMCNC: 31.7 PG — SIGNIFICANT CHANGE UP (ref 27–34)
MCHC RBC-ENTMCNC: 34.4 G/DL — SIGNIFICANT CHANGE UP (ref 32–36)
MCV RBC AUTO: 92 FL — SIGNIFICANT CHANGE UP (ref 80–100)
NRBC # BLD AUTO: 0 K/UL — SIGNIFICANT CHANGE UP (ref 0–0)
NRBC # FLD: 0 K/UL — SIGNIFICANT CHANGE UP (ref 0–0)
NRBC BLD AUTO-RTO: 0 /100 WBCS — SIGNIFICANT CHANGE UP (ref 0–0)
PLATELET # BLD AUTO: 236 K/UL — SIGNIFICANT CHANGE UP (ref 150–400)
PMV BLD: 9.2 FL — SIGNIFICANT CHANGE UP (ref 7–13)
POTASSIUM SERPL-MCNC: 4.1 MMOL/L — SIGNIFICANT CHANGE UP (ref 3.5–5.3)
POTASSIUM SERPL-SCNC: 4.1 MMOL/L — SIGNIFICANT CHANGE UP (ref 3.5–5.3)
PROT SERPL-MCNC: 7.4 GM/DL — SIGNIFICANT CHANGE UP (ref 6–8.3)
RBC # BLD: 4.36 M/UL — SIGNIFICANT CHANGE UP (ref 4.2–5.8)
RBC # FLD: 12.7 % — SIGNIFICANT CHANGE UP (ref 10.3–14.5)
SODIUM SERPL-SCNC: 136 MMOL/L — SIGNIFICANT CHANGE UP (ref 135–145)
TROPONIN I, HIGH SENSITIVITY RESULT: 18.66 NG/L — SIGNIFICANT CHANGE UP
WBC # BLD: 6.23 K/UL — SIGNIFICANT CHANGE UP (ref 3.8–10.5)
WBC # FLD AUTO: 6.23 K/UL — SIGNIFICANT CHANGE UP (ref 3.8–10.5)

## 2025-07-17 PROCEDURE — 93356 MYOCRD STRAIN IMG SPCKL TRCK: CPT

## 2025-07-17 PROCEDURE — 93306 TTE W/DOPPLER COMPLETE: CPT | Mod: 26

## 2025-07-17 PROCEDURE — 99232 SBSQ HOSP IP/OBS MODERATE 35: CPT

## 2025-07-17 RX ORDER — CELECOXIB 50 MG/1
1 CAPSULE ORAL
Refills: 0 | DISCHARGE

## 2025-07-17 RX ORDER — CARVEDILOL 3.12 MG/1
6.25 TABLET, FILM COATED ORAL EVERY 12 HOURS
Refills: 0 | Status: DISCONTINUED | OUTPATIENT
Start: 2025-07-17 | End: 2025-07-18

## 2025-07-17 RX ADMIN — Medication 10 MILLIGRAM(S): at 13:52

## 2025-07-17 RX ADMIN — HEPARIN SODIUM 5000 UNIT(S): 1000 INJECTION INTRAVENOUS; SUBCUTANEOUS at 06:16

## 2025-07-17 RX ADMIN — Medication 1 MILLIGRAM(S): at 09:02

## 2025-07-17 RX ADMIN — Medication 1 MILLIGRAM(S): at 21:48

## 2025-07-17 RX ADMIN — Medication 3 MILLIGRAM(S): at 21:48

## 2025-07-17 RX ADMIN — Medication 650 MILLIGRAM(S): at 00:27

## 2025-07-17 RX ADMIN — CARVEDILOL 6.25 MILLIGRAM(S): 3.12 TABLET, FILM COATED ORAL at 21:48

## 2025-07-17 RX ADMIN — SERTRALINE 50 MILLIGRAM(S): 100 TABLET, FILM COATED ORAL at 09:03

## 2025-07-17 RX ADMIN — Medication 10 MILLIGRAM(S): at 21:49

## 2025-07-17 RX ADMIN — Medication 1 MILLIGRAM(S): at 16:00

## 2025-07-17 RX ADMIN — HEPARIN SODIUM 5000 UNIT(S): 1000 INJECTION INTRAVENOUS; SUBCUTANEOUS at 17:22

## 2025-07-17 RX ADMIN — ROSUVASTATIN CALCIUM 10 MILLIGRAM(S): 20 TABLET, FILM COATED ORAL at 21:49

## 2025-07-17 NOTE — PHYSICAL THERAPY INITIAL EVALUATION ADULT - MODALITIES TREATMENT COMMENTS
pt left in bed supine post Eval @ pt request; bed alarm on; HM in place; callbell in reach; pt instructed not to get up alone; call nursing for assist; macie well; denied pain

## 2025-07-17 NOTE — PROGRESS NOTE ADULT - SUBJECTIVE AND OBJECTIVE BOX
CHIEF COMPLAINT: Syncope    SUBJECTIVE/SIGNIFICANT INTERVAL EVENTS/OVERNIGHT EVENTS:    7/17: no acute events. DIscussed with cardiology. Planned angio 7/18 @ [facility] for anticipated CEA in near future, in the setting of syncope without other cause->transfer to   [facility] for angio + possible CEA    Review of Systems: 14 Point review of systems reviewed and reported as negative unless otherwise stated above    FROM H&P:  "74yoM PMH anxiety, PTSD, CVA, Duodenal ulcer, HLD, Chronic back pain, HTN presents for syncopal episode. He states he was walking up the stairs when he passed out, and fell down 5 stairs backwards, hit head on the night stand. The patient denies any fevers, chills, chest pain, palpitations, SOB, abdominal pain, tonic clonic movements, tongue biting, or any N/V/D. Of note, the patient states he was scheduled for a possible procedure/stent in his neck (?Carotid Stenosis) at [facility] today. He is unsure of his doctors name or the actual procedure being performed.     In the ER, a CT Head and C- Spine revealed no acute findings. The patient will be admitted to the hospitalist group for further evaluation."    PHYSICAL EXAM:    T(C): 36.8 (07-18-25 @ 05:53), Max: 37.6 (07-17-25 @ 16:00)  HR: 79 (07-18-25 @ 05:53) (77 - 101)  BP: 162/90 (07-18-25 @ 05:53) (152/91 - 172/92)  RR: 16 (07-18-25 @ 05:53) (16 - 19)  SpO2: 98% (07-18-25 @ 05:53) (95% - 100%)    General: AAOx3; NAD  Head: AT/NC  ENT: Moist Mucous Membranes; No Injury  Eyes: EOMI; PERRL  Neck: Non-tender; No JVD  CVS: RRR, S1&S2, No murmur, No edema  Respiratory: Lungs CTA B/L; Normal Respiratory Effort  Abdomen/GI: Soft, non-tender, non-distended, no guarding, no rebound, normal bowel sounds  : No bladder distention, No Gann  Extremities: No cyanosis, No clubbing, No edema  MSK: No CVA tenderness, Normal ROM, No injury  Neuro: AAOx3, CNII-XII grossly intact, non-focal  Psych: Appropriate, Cooperative,   Skin: Clean, Dry and Intact      LABS:                          13.8   6.23  )-----------( 236      ( 17 Jul 2025 08:24 )             40.1     07-17    136  |  108  |  17  ----------------------------<  107[H]  4.1   |  22  |  1.39[H]    Ca    9.9      17 Jul 2025 08:24    TPro  7.4  /  Alb  3.8  /  TBili  0.6  /  DBili  x   /  AST  30  /  ALT  37  /  AlkPhos  95  07-17      CAPILLARY BLOOD GLUCOSE      Troponin I, High Sensitivity (07.17.25 @ 08:24)   Troponin I, High Sensitivity Result: 18.66: FluA/FluB/RSV/COVID PCR (07.16.25 @ 12:11)   SARS-CoV-2 Result: NotDete: This molecular assay uses polymerase chain reaction (PCR) to detect   influenza A virus, influenza B virus, respiratory syncytial virus (RSV),   and SARS-CoV-2 (COVID-19). Test results should be correlated with   clinical presentation, patient history, and epidemiology.  Influenza A Result: NotDete  Influenza B Result: NotDete  Resp Syn Virus Result: NotDetec  Source Respiratory: Nasopharyngeal        RADIOLOGY:  < from: US Duplex Carotid Arteries Complete, Bilateral (07.16.25 @ 18:21) >  Antegrade flow is noted within both vertebral arteries.    IMPRESSION: No significant hemodynamic stenosis of either carotid artery.    Measurement of carotid stenosis is based on updated recommendations for   carotid stenosis interpretation criteria from the Intersocietal   Accreditation Commission (IAC) Vascular Testing Board, modified from the   Society of Radiologists in Ultrasound (SRU) Consensus Conference Criteria   for Internal Carotid Artery Stenosis.    < end of copied text >  < from: Xray Chest 1 View-PORTABLE IMMEDIATE (Xray Chest 1 View-PORTABLE IMMEDIATE .) (07.16.25 @ 13:13) >  IMPRESSION: No acute finding or change.    < end of copied text >  < from: CT Head No Cont (07.16.25 @ 11:07) >  IMPRESSION:  HEAD CT:  No evidence of an acute traumatic intracranial injury.  CERVICAL SPINE CT:  No evidence of an acute cervical spine fracture.    < end of copied text >      EKG:  < from: 12 Lead ECG (07.16.25 @ 10:34) >    Diagnosis Line Normal sinus rhythm  Normal ECG  When compared with ECG of 03-APR-2024 07:30,  No significant change was found    < end of copied text >      ECHO:  < from: TTE Echo Complete w/o Contrast w/ Doppler (04.03.24 @ 08:40) >   Summary     The left ventricle is normal in size and systolic function. Estimated   left   ventricular ejection fraction is 55-60 %.   Mild diastolic dysfunction (stage I).   Normal appearing right ventricle structure and function.   Aortic valve sclerosis.    < end of copied text >              I personally reviewed labs, imaging, ekg, orders and vitals.    Discussed case with:  [X]RN  [X]CLARIBEL/ROSITA  [X]Patient  []Family  [X]Specialist: Cardiology

## 2025-07-17 NOTE — DISCHARGE NOTE PROVIDER - NSDCCPCAREPLAN_GEN_ALL_CORE_FT
PRINCIPAL DISCHARGE DIAGNOSIS  Diagnosis: Syncope  Assessment and Plan of Treatment:       SECONDARY DISCHARGE DIAGNOSES  Diagnosis: Hypertensive urgency  Assessment and Plan of Treatment:     Diagnosis: Arteriosclerotic cardiovascular disease (ASCVD)  Assessment and Plan of Treatment:

## 2025-07-17 NOTE — DISCHARGE NOTE PROVIDER - HOSPITAL COURSE
FROM H&P:    "74yoM PMH anxiety, PTSD, CVA, Duodenal ulcer, HLD, Chronic back pain, HTN presents for syncopal episode. He states he was walking up the stairs when he passed out, and fell down 5 stairs backwards, hit head on the night stand. The patient denies any fevers, chills, chest pain, palpitations, SOB, abdominal pain, tonic clonic movements, tongue biting, or any N/V/D. Of note, the patient states he was scheduled for a possible procedure/stent in his neck (?Carotid Stenosis) at [facility] today. He is unsure of his doctors name or the actual procedure being performed.     In the ER, a CT Head and C- Spine revealed no acute findings. The patient will be admitted to the hospitalist group for further evaluation."    Admitted for further evaluation. DIscussed with cardiology. confirmed 70-80% stenosis in carotid on recent outpatient CTA. Due to angio 7/18. However ended up  for evaluation for syncope. Carotid Doppler here unremarkable. BP uncontrolled. Cardiology consult/recs->transfer [facility] and subsequent evaluation and possible intervention for carotid stenosis.     Admitting/Discharge Diagnosis    Syncope.  Severe Carotid Stenosis  Mild AS  CAD/ASCVD  PTSD    PHYSICAL EXAM:    T(C): 36.8 (07-17-25 @ 08:44), Max: 37.4 (07-16-25 @ 18:30)  HR: 77 (07-17-25 @ 08:44) (73 - 93)  BP: 172/92 (07-17-25 @ 08:44) (147/99 - 207/81)  RR: 18 (07-17-25 @ 08:44) (16 - 20)  SpO2: 100% (07-17-25 @ 08:44) (97% - 100%)    General: AAOx3; NAD  Head: AT/NC  ENT: Moist Mucous Membranes; No Injury  Eyes: EOMI; PERRL  Neck: Non-tender; No JVD  CVS: RRR, S1&S2, Murmur +  Respiratory: Lungs CTA B/L; Normal Respiratory Effort  Abdomen/GI: Soft, non-tender, non-distended, no guarding, no rebound, normal bowel sounds  : No bladder distention, No Gann  Extremities: No cyanosis, No clubbing, No edema  MSK: No CVA tenderness, Normal ROM, No injury  Neuro: AAOx3, CNII-XII grossly intact, non-focal  Psych: Appropriate, Cooperative,  Skin: Clean, Dry and Intact  Discharge Management: 37 minutes  Date of Discharge/service: 7/17/2025

## 2025-07-17 NOTE — DISCHARGE NOTE NURSING/CASE MANAGEMENT/SOCIAL WORK - PATIENT PORTAL LINK FT
You can access the FollowMyHealth Patient Portal offered by [facility] by registering at the following website: [email or web address]. By joining [employer]’s FollowMyHealth portal, you will also be able to view your health information using other applications (apps) compatible with our system.

## 2025-07-17 NOTE — DISCHARGE NOTE NURSING/CASE MANAGEMENT/SOCIAL WORK - FINANCIAL ASSISTANCE
[facility] provides services at a reduced cost to those who are determined to be eligible through [facility]’s financial assistance program. Information regarding [facility]’s financial assistance program can be found by going to [email or web address] or by calling [phone].

## 2025-07-17 NOTE — CONSULT NOTE ADULT - SUBJECTIVE AND OBJECTIVE BOX
Patient is a 74y old  Male who presents with a chief complaint of Syncope (17 Jul 2025 11:26)    ________________________________  ROSALINA FRY is a 74y year old Male with a past medical history of severe right carotid artery disease on CTA, coronary artery disease, by CT calcium score, with abnormal resting images on perfusion imaging, PTSD, history of CVA, duodenal ulcer, hyperlipidemia, chronic back pain and hypertension.  Patient resides at home alone.    He was here for coronary angiogram, and was getting ready to leave his house but had a syncopal event without any warning on his status.  No prodromal chest discomfort, shortness of breath or palpitations.  No prior history of syncope  EKG showed abnormalities.  Cardiac enzymes negative.  CTA shows no bleed.  Sinus rhythm PVC on telemetry.    ________________________________  Review of systems: A 10 point review of system has been performed, and is negative except for what has been mentioned in the above history of present illness.     PAST MEDICAL & SURGICAL HISTORY:  Retinal arterial branch occlusion, right  vision loss right eye      Backache  spinal stenosis      CVA (cerebral infarction)  6/2014- small rt ant.cerebral artery subacute infarct      Depression      UTI (lower urinary tract infection)  mssa      Retention of urine  6/2014      Anemia      Seizure  6/2014- abnormal eeg      FTT (failure to thrive) in adult  6/2014      Hyponatremia  124 in June 2014      HTN (hypertension)      Alcohol abuse  hx of etoh abuse      No significant past surgical history        FAMILY HISTORY:  Family history unknown         SOCIAL HISTORY: The patient denies any tobacco abuse, alcohol abuse or illicit drug use.    ALLERGIES:  No Known Allergies    Home Medications:  ALPRAZolam 2 mg oral tablet: 1 tab(s) orally 3 times a day ***DrFirst*** (16 Jul 2025 14:49)  brimonidine 0.2% ophthalmic solution: 1 drop(s) in the left eye 2 times a day ***DrFirst*** (16 Jul 2025 14:49)  clopidogrel 75 mg oral tablet: 1 tab(s) orally once a day ***DrFirst*** (16 Jul 2025 14:49)  dorzolamide-timolol 2.23%-0.68% (2%-0.5% base) ophthalmic solution: 1 drop(s) to each affected eye 2 times a day ***DrFirst*** (16 Jul 2025 13:14)  latanoprost 0.005% ophthalmic solution: 1 drop(s) to each affected eye once a day (at bedtime) ***DrFirst*** (16 Jul 2025 13:14)  Rhopressa 0.02% ophthalmic solution: 1 drop(s) in each affected eye once a day (at bedtime) (16 Jul 2025 14:49)  rosuvastatin 10 mg oral tablet: 1 tab(s) orally once a day ***DrFirst*** (16 Jul 2025 14:49)  sertraline 50 mg oral tablet: 1 tab(s) orally once a day ***DrFirst*** (16 Jul 2025 14:49)  tiZANidine 4 mg oral tablet: 1 tab(s) orally 3 times a day ***DrFirst*** (16 Jul 2025 14:49)    MEDICATIONS  (STANDING):  heparin   Injectable 5000 Unit(s) SubCutaneous every 12 hours  rosuvastatin 10 milliGRAM(s) Oral at bedtime  sertraline 50 milliGRAM(s) Oral daily  sodium chloride 0.9%. 1000 milliLiter(s) (75 mL/Hr) IV Continuous <Continuous>    MEDICATIONS  (PRN):  acetaminophen     Tablet .. 650 milliGRAM(s) Oral every 6 hours PRN Temp greater or equal to 38C (100.4F), Mild Pain (1 - 3)  ALPRAZolam 1 milliGRAM(s) Oral three times a day PRN anxiety  aluminum hydroxide/magnesium hydroxide/simethicone Suspension 30 milliLiter(s) Oral every 4 hours PRN Dyspepsia  hydrALAZINE 10 milliGRAM(s) Oral every 6 hours PRN Systolic/Diastolic >  melatonin 3 milliGRAM(s) Oral at bedtime PRN Insomnia  ondansetron Injectable 4 milliGRAM(s) IV Push every 8 hours PRN Nausea and/or Vomiting  tiZANidine 4 milliGRAM(s) Oral three times a day PRN Muscle Spasm    Vital Signs Last 24 Hrs  T(C): 37 (17 Jul 2025 13:00), Max: 37.4 (16 Jul 2025 18:30)  T(F): 98.6 (17 Jul 2025 13:00), Max: 99.3 (16 Jul 2025 18:30)  HR: 99 (17 Jul 2025 13:00) (73 - 99)  BP: 163/96 (17 Jul 2025 13:00) (147/99 - 207/81)  BP(mean): 109 (16 Jul 2025 23:33) (109 - 132)  RR: 18 (17 Jul 2025 13:00) (16 - 20)  SpO2: 98% (17 Jul 2025 13:00) (97% - 100%)    Parameters below as of 17 Jul 2025 13:00  Patient On (Oxygen Delivery Method): room air      I&O's Summary    16 Jul 2025 07:01  -  17 Jul 2025 07:00  --------------------------------------------------------  IN: 0 mL / OUT: 750 mL / NET: -750 mL    17 Jul 2025 07:01  -  17 Jul 2025 14:51  --------------------------------------------------------  IN: 425 mL / OUT: 0 mL / NET: 425 mL      ________________________________  GENERAL APPEARANCE:  No acute distress  HEAD: normocephalic, atraumatic  NECK: supple, no jugular venous distention, no carotid bruit    HEART: Regular rate and rhythm, S1, S2 normal, 1/6 murmur    CHEST:  No anterior chest wall tenderness    LUNGS:  Clear to auscultation, without any wheezing, rhonchi or rales    ABDOMEN: soft, nontender, nondistended, with positive bowel sounds appreciated  EXTREMITIES: no edema.   NEURO: Alert and oriented x3  PSYC:  Normal affect  SKIN:  Dry  ________________________________   TELEMETRY: Sinus rhythm with PVCs    ECG: Sinus rhythm, no significant ST-T wave changes    LABS:                        13.8   6.23  )-----------( 236      ( 17 Jul 2025 08:24 )             40.1             07-17    136  |  108  |  17  ----------------------------<  107[H]  4.1   |  22  |  1.39[H]    Ca    9.9      17 Jul 2025 08:24    TPro  7.4  /  Alb  3.8  /  TBili  0.6  /  DBili  x   /  AST  30  /  ALT  37  /  AlkPhos  95  07-17      LIVER FUNCTIONS - ( 17 Jul 2025 08:24 )  Alb: 3.8 g/dL / Pro: 7.4 gm/dL / ALK PHOS: 95 U/L / ALT: 37 U/L / AST: 30 U/L / GGT: x           Urinalysis Basic - ( 17 Jul 2025 08:24 )    Color: x / Appearance: x / SG: x / pH: x  Gluc: 107 mg/dL / Ketone: x  / Bili: x / Urobili: x   Blood: x / Protein: x / Nitrite: x   Leuk Esterase: x / RBC: x / WBC x   Sq Epi: x / Non Sq Epi: x / Bacteria: x          Urinalysis Basic - ( 17 Jul 2025 08:24 )    Color: x / Appearance: x / SG: x / pH: x  Gluc: 107 mg/dL / Ketone: x  / Bili: x / Urobili: x   Blood: x / Protein: x / Nitrite: x   Leuk Esterase: x / RBC: x / WBC x   Sq Epi: x / Non Sq Epi: x / Bacteria: x             ________________________________    RADIOLOGY & ADDITIONAL STUDIES: IMPRESSION: No significant hemodynamic stenosis of either carotid artery.    Measurement of carotid stenosis is based on updated recommendations for   carotid stenosis interpretation criteria from the Intersocietal   Accreditation Commission (IAC) Vascular Testing Board, modified from the   Society of Radiologists in Ultrasound (SRU) Consensus Conference Criteria   for Internal Carotid Artery Stenosis.      ________________________________    ASSESSMENT:  Syncope  Coronary artery disease  Hypertension  Hyperlipidemia  Carotid artery stenosis    PLAN:  In summary, this is a 74y Male with a past medical history of CAD, carotid artery disease, hypertension and hyperlipidemia.  Patient today for syncope  Cannot rule out ventricular arrhythmia due to underlying ischemia.  Patient has severely elevated calcium score, and has syncope without warning.  Symptoms does not appear to be orthostatic or vagal.  CT head normal.  No prior history of syncope.    Sinus rhythm with PVC on telemetry.  Patient scheduled to have coronary angiogram in winter.  Will coordinate transfer.  Discussed with Dr. Mendoza.  Patient agreeable for transfer.    Carotid Doppler Doppler done here did not show elevated velocity, however CTA showed severe carotid artery disease and the patient is followed by vascular surgery.    Continue antiplatelet and statin.    Add beta-blocker for elevated blood pressure.  ____________________________________________  (Dragon Dictation software used). Thank you for allowing me to participate in the care of your patient. Please contact me should any questions arise.    ROSALINA Casas DO, [facility]  Office: [phone]

## 2025-07-17 NOTE — DISCHARGE NOTE NURSING/CASE MANAGEMENT/SOCIAL WORK - NSDCPEFALRISK_GEN_ALL_CORE
For information on Fall & Injury Prevention, visit: [email or web address] OR  [email or web address] OR  https://www.cdc.gov/steadi/patient.html

## 2025-07-17 NOTE — DISCHARGE NOTE PROVIDER - CARE PROVIDERS DIRECT ADDRESSES
,DirectAddress_Unknown,[email or web address],DirectAddress_Unknown

## 2025-07-17 NOTE — DISCHARGE NOTE PROVIDER - NSDCMRMEDTOKEN_GEN_ALL_CORE_FT
ALPRAZolam 2 mg oral tablet: 1 tab(s) orally 3 times a day ***DrFirst***  brimonidine 0.2% ophthalmic solution: 1 drop(s) in the left eye 2 times a day ***DrFirst***  clopidogrel 75 mg oral tablet: 1 tab(s) orally once a day ***DrFirst***  dorzolamide-timolol 2.23%-0.68% (2%-0.5% base) ophthalmic solution: 1 drop(s) to each affected eye 2 times a day ***DrFirst***  latanoprost 0.005% ophthalmic solution: 1 drop(s) to each affected eye once a day (at bedtime) ***DrFirst***  Rhopressa 0.02% ophthalmic solution: 1 drop(s) in each affected eye once a day (at bedtime)  rosuvastatin 10 mg oral tablet: 1 tab(s) orally once a day ***DrFirst***  sertraline 50 mg oral tablet: 1 tab(s) orally once a day ***DrFirst***  tiZANidine 4 mg oral tablet: 1 tab(s) orally 3 times a day ***DrFirst***

## 2025-07-17 NOTE — DISCHARGE NOTE PROVIDER - CARE PROVIDER_API CALL
Wilfredo Cunningham ()  Internal Medicine  [address] [place] [address], [address]  [place] [address]  Phone: [phone]  Fax: [phone]  Established Patient  Follow Up Time: 2 weeks    Thi Mendoza)  Cardiovascular Disease  [address] [facility] [place] [address]  Phone: [phone]  Fax: [phone]  Established Patient  Follow Up Time: 1-3 days    Vascular,   Phone: (   )    -  Fax: (   )    -  Established Patient  Follow Up Time: 1-3 days

## 2025-07-17 NOTE — PROGRESS NOTE ADULT - ASSESSMENT
MEDICATIONS  (STANDING):  carvedilol 6.25 milliGRAM(s) Oral every 12 hours  heparin   Injectable 5000 Unit(s) SubCutaneous every 12 hours  rosuvastatin 10 milliGRAM(s) Oral at bedtime  sertraline 50 milliGRAM(s) Oral daily  sodium chloride 0.9%. 1000 milliLiter(s) (75 mL/Hr) IV Continuous <Continuous>    MEDICATIONS  (PRN):  acetaminophen     Tablet .. 650 milliGRAM(s) Oral every 6 hours PRN Temp greater or equal to 38C (100.4F), Mild Pain (1 - 3)  ALPRAZolam 1 milliGRAM(s) Oral three times a day PRN anxiety  aluminum hydroxide/magnesium hydroxide/simethicone Suspension 30 milliLiter(s) Oral every 4 hours PRN Dyspepsia  hydrALAZINE 10 milliGRAM(s) Oral every 6 hours PRN Systolic/Diastolic >  melatonin 3 milliGRAM(s) Oral at bedtime PRN Insomnia  ondansetron Injectable 4 milliGRAM(s) IV Push every 8 hours PRN Nausea and/or Vomiting  tiZANidine 4 milliGRAM(s) Oral three times a day PRN Muscle Spasm      Admitted for further evaluation. DIscussed with cardiology. confirmed 70-80% stenosis in carotid on recent outpatient CTA. Due for  angio 7/18. However ended up HH for evaluation for syncope. Carotid Doppler here unremarkable. BP uncontrolled. Cardiology consult/recs->transfer to [facility] and subsequent evaluation and possible intervention for carotid stenosis.     Admitting/Discharge Diagnosis    Syncope.  Severe Carotid Stenosis  Mild AS  CAD/ASCVD  Chronic Diastolic heart failure  PTSD    Plan of care-> Transfer to [facility] for ANgio and possible evaluation for CEA      7/17: no acute events. DIscussed with cardiology. Planned angio 7/18 @ [facility] for anticipated CEA in near future, in the setting of syncope without other cause->transfer to   [facility] for angio + possible CEA

## 2025-07-18 VITALS
HEART RATE: 81 BPM | TEMPERATURE: 98 F | DIASTOLIC BLOOD PRESSURE: 98 MMHG | SYSTOLIC BLOOD PRESSURE: 183 MMHG | RESPIRATION RATE: 18 BRPM | OXYGEN SATURATION: 96 %

## 2025-07-18 PROCEDURE — 99239 HOSP IP/OBS DSCHRG MGMT >30: CPT

## 2025-07-18 RX ADMIN — HEPARIN SODIUM 5000 UNIT(S): 1000 INJECTION INTRAVENOUS; SUBCUTANEOUS at 06:13

## 2025-07-18 RX ADMIN — CARVEDILOL 6.25 MILLIGRAM(S): 3.12 TABLET, FILM COATED ORAL at 07:32

## 2025-07-18 RX ADMIN — Medication 1 MILLIGRAM(S): at 07:31

## 2025-07-24 DIAGNOSIS — I50.32 CHRONIC DIASTOLIC (CONGESTIVE) HEART FAILURE: ICD-10-CM

## 2025-07-24 DIAGNOSIS — I35.0 NONRHEUMATIC AORTIC (VALVE) STENOSIS: ICD-10-CM

## 2025-07-24 DIAGNOSIS — N17.9 ACUTE KIDNEY FAILURE, UNSPECIFIED: ICD-10-CM

## 2025-07-24 DIAGNOSIS — R55 SYNCOPE AND COLLAPSE: ICD-10-CM

## 2025-07-24 DIAGNOSIS — Z86.73 PERSONAL HISTORY OF TRANSIENT ISCHEMIC ATTACK (TIA), AND CEREBRAL INFARCTION WITHOUT RESIDUAL DEFICITS: ICD-10-CM

## 2025-07-24 DIAGNOSIS — F41.9 ANXIETY DISORDER, UNSPECIFIED: ICD-10-CM

## 2025-07-24 DIAGNOSIS — Z79.899 OTHER LONG TERM (CURRENT) DRUG THERAPY: ICD-10-CM

## 2025-07-24 DIAGNOSIS — Z87.440 PERSONAL HISTORY OF URINARY (TRACT) INFECTIONS: ICD-10-CM

## 2025-07-24 DIAGNOSIS — I11.0 HYPERTENSIVE HEART DISEASE WITH HEART FAILURE: ICD-10-CM

## 2025-07-24 DIAGNOSIS — I65.21 OCCLUSION AND STENOSIS OF RIGHT CAROTID ARTERY: ICD-10-CM

## 2025-07-24 DIAGNOSIS — K26.9 DUODENAL ULCER, UNSPECIFIED AS ACUTE OR CHRONIC, WITHOUT HEMORRHAGE OR PERFORATION: ICD-10-CM
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
## 2025-07-24 DIAGNOSIS — F32.A DEPRESSION, UNSPECIFIED: ICD-10-CM

## 2025-07-24 DIAGNOSIS — H54.7 UNSPECIFIED VISUAL LOSS: ICD-10-CM

## 2025-07-24 DIAGNOSIS — E78.5 HYPERLIPIDEMIA, UNSPECIFIED: ICD-10-CM

## 2025-07-24 DIAGNOSIS — F43.10 POST-TRAUMATIC STRESS DISORDER, UNSPECIFIED: ICD-10-CM

## 2025-07-24 DIAGNOSIS — I16.0 HYPERTENSIVE URGENCY: ICD-10-CM

## 2025-07-24 DIAGNOSIS — I25.10 ATHEROSCLEROTIC HEART DISEASE OF NATIVE CORONARY ARTERY WITHOUT ANGINA PECTORIS: ICD-10-CM
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
## 2025-07-24 DIAGNOSIS — G89.29 OTHER CHRONIC PAIN: ICD-10-CM
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
## 2025-08-14 RX ORDER — LEVOFLOXACIN 25 MG/ML
1 SOLUTION ORAL
Refills: 0 | DISCHARGE
Start: 2025-08-14

## 2025-08-18 ENCOUNTER — INPATIENT (INPATIENT)
Facility: HOSPITAL | Age: 75
LOS: 1 days | Discharge: HOME | DRG: 303 | End: 2025-08-20
Attending: STUDENT IN AN ORGANIZED HEALTH CARE EDUCATION/TRAINING PROGRAM | Admitting: STUDENT IN AN ORGANIZED HEALTH CARE EDUCATION/TRAINING PROGRAM
Payer: MEDICARE

## 2025-08-18 VITALS
HEIGHT: 67 IN | RESPIRATION RATE: 18 BRPM | HEART RATE: 98 BPM | OXYGEN SATURATION: 98 % | DIASTOLIC BLOOD PRESSURE: 92 MMHG | SYSTOLIC BLOOD PRESSURE: 171 MMHG | WEIGHT: 164.91 LBS | TEMPERATURE: 99 F

## 2025-08-18 DIAGNOSIS — R07.9 CHEST PAIN, UNSPECIFIED: ICD-10-CM

## 2025-08-18 LAB
ALBUMIN SERPL ELPH-MCNC: 3.9 G/DL — SIGNIFICANT CHANGE UP (ref 3.3–5)
ALP SERPL-CCNC: 134 U/L — HIGH (ref 40–120)
ALT FLD-CCNC: 34 U/L — SIGNIFICANT CHANGE UP (ref 12–78)
ANION GAP SERPL CALC-SCNC: 11 MMOL/L — SIGNIFICANT CHANGE UP (ref 5–17)
APPEARANCE UR: CLEAR — SIGNIFICANT CHANGE UP
AST SERPL-CCNC: 22 U/L — SIGNIFICANT CHANGE UP (ref 15–37)
BACTERIA # UR AUTO: NEGATIVE /HPF — SIGNIFICANT CHANGE UP
BASE EXCESS BLDV CALC-SCNC: -0.5 MMOL/L — SIGNIFICANT CHANGE UP (ref -2–3)
BASOPHILS # BLD AUTO: 0.01 K/UL — SIGNIFICANT CHANGE UP (ref 0–0.2)
BASOPHILS NFR BLD AUTO: 0.2 % — SIGNIFICANT CHANGE UP (ref 0–2)
BILIRUB SERPL-MCNC: 0.7 MG/DL — SIGNIFICANT CHANGE UP (ref 0.2–1.2)
BILIRUB UR-MCNC: NEGATIVE — SIGNIFICANT CHANGE UP
BUN SERPL-MCNC: 14 MG/DL — SIGNIFICANT CHANGE UP (ref 7–23)
CALCIUM SERPL-MCNC: 10.2 MG/DL — HIGH (ref 8.5–10.1)
CAST: 0 /LPF — SIGNIFICANT CHANGE UP (ref 0–4)
CHLORIDE SERPL-SCNC: 105 MMOL/L — SIGNIFICANT CHANGE UP (ref 96–108)
CK SERPL-CCNC: 68 U/L — SIGNIFICANT CHANGE UP (ref 26–308)
CO2 SERPL-SCNC: 19 MMOL/L — LOW (ref 22–31)
COLOR SPEC: YELLOW — SIGNIFICANT CHANGE UP
CREAT SERPL-MCNC: 1.72 MG/DL — HIGH (ref 0.5–1.3)
D DIMER BLD IA.RAPID-MCNC: 680 NG/ML DDU — HIGH
DIFF PNL FLD: NEGATIVE — SIGNIFICANT CHANGE UP
EGFR: 42 ML/MIN/1.73M2 — LOW
EGFR: 42 ML/MIN/1.73M2 — LOW
EOSINOPHIL # BLD AUTO: 0.01 K/UL — SIGNIFICANT CHANGE UP (ref 0–0.5)
EOSINOPHIL NFR BLD AUTO: 0.2 % — SIGNIFICANT CHANGE UP (ref 0–6)
GAS PNL BLDV: SIGNIFICANT CHANGE UP
GLUCOSE SERPL-MCNC: 136 MG/DL — HIGH (ref 70–99)
GLUCOSE UR QL: NEGATIVE MG/DL — SIGNIFICANT CHANGE UP
HCO3 BLDV-SCNC: 20 MMOL/L — LOW (ref 22–29)
HCT VFR BLD CALC: 38.5 % — LOW (ref 39–50)
HGB BLD-MCNC: 12.9 G/DL — LOW (ref 13–17)
IMM GRANULOCYTES # BLD AUTO: 0.02 K/UL — SIGNIFICANT CHANGE UP (ref 0–0.07)
IMM GRANULOCYTES NFR BLD AUTO: 0.4 % — SIGNIFICANT CHANGE UP (ref 0–0.9)
KETONES UR QL: ABNORMAL MG/DL
LACTATE SERPL-SCNC: 1.8 MMOL/L — SIGNIFICANT CHANGE UP (ref 0.7–2)
LACTATE SERPL-SCNC: 3.6 MMOL/L — HIGH (ref 0.7–2)
LEUKOCYTE ESTERASE UR-ACNC: NEGATIVE — SIGNIFICANT CHANGE UP
LIDOCAIN IGE QN: 22 U/L — SIGNIFICANT CHANGE UP (ref 13–75)
LYMPHOCYTES # BLD AUTO: 0.43 K/UL — LOW (ref 1–3.3)
LYMPHOCYTES NFR BLD AUTO: 8 % — LOW (ref 13–44)
MAGNESIUM SERPL-MCNC: 1.7 MG/DL — SIGNIFICANT CHANGE UP (ref 1.6–2.6)
MANUAL SMEAR VERIFICATION: SIGNIFICANT CHANGE UP
MCHC RBC-ENTMCNC: 30.5 PG — SIGNIFICANT CHANGE UP (ref 27–34)
MCHC RBC-ENTMCNC: 33.5 G/DL — SIGNIFICANT CHANGE UP (ref 32–36)
MCV RBC AUTO: 91 FL — SIGNIFICANT CHANGE UP (ref 80–100)
MONOCYTES # BLD AUTO: 0.34 K/UL — SIGNIFICANT CHANGE UP (ref 0–0.9)
MONOCYTES NFR BLD AUTO: 6.3 % — SIGNIFICANT CHANGE UP (ref 2–14)
NEUTROPHILS # BLD AUTO: 4.56 K/UL — SIGNIFICANT CHANGE UP (ref 1.8–7.4)
NEUTROPHILS NFR BLD AUTO: 84.9 % — HIGH (ref 43–77)
NITRITE UR-MCNC: NEGATIVE — SIGNIFICANT CHANGE UP
NRBC # BLD AUTO: 0 K/UL — SIGNIFICANT CHANGE UP (ref 0–0)
NRBC # FLD: 0 K/UL — SIGNIFICANT CHANGE UP (ref 0–0)
NRBC BLD AUTO-RTO: 0 /100 WBCS — SIGNIFICANT CHANGE UP (ref 0–0)
NT-PROBNP SERPL-SCNC: 717 PG/ML — HIGH (ref 0–125)
PCO2 BLDV: 23 MMHG — LOW (ref 42–55)
PH BLDV: 7.55 — HIGH (ref 7.32–7.43)
PH UR: 8 — SIGNIFICANT CHANGE UP (ref 5–8)
PLAT MORPH BLD: NORMAL — SIGNIFICANT CHANGE UP
PLATELET # BLD AUTO: 299 K/UL — SIGNIFICANT CHANGE UP (ref 150–400)
PMV BLD: 8.9 FL — SIGNIFICANT CHANGE UP (ref 7–13)
PO2 BLDV: 34 MMHG — SIGNIFICANT CHANGE UP (ref 25–45)
POTASSIUM SERPL-MCNC: 4 MMOL/L — SIGNIFICANT CHANGE UP (ref 3.5–5.3)
POTASSIUM SERPL-SCNC: 4 MMOL/L — SIGNIFICANT CHANGE UP (ref 3.5–5.3)
PROT SERPL-MCNC: 8.2 GM/DL — SIGNIFICANT CHANGE UP (ref 6–8.3)
PROT UR-MCNC: 30 MG/DL
RBC # BLD: 4.23 M/UL — SIGNIFICANT CHANGE UP (ref 4.2–5.8)
RBC # FLD: 13.8 % — SIGNIFICANT CHANGE UP (ref 10.3–14.5)
RBC BLD AUTO: NORMAL — SIGNIFICANT CHANGE UP
RBC CASTS # UR COMP ASSIST: 0 /HPF — SIGNIFICANT CHANGE UP (ref 0–4)
SAO2 % BLDV: 69 % — SIGNIFICANT CHANGE UP (ref 67–88)
SODIUM SERPL-SCNC: 135 MMOL/L — SIGNIFICANT CHANGE UP (ref 135–145)
SP GR SPEC: 1.02 — SIGNIFICANT CHANGE UP (ref 1–1.03)
SQUAMOUS # UR AUTO: 0 /HPF — SIGNIFICANT CHANGE UP (ref 0–5)
TROPONIN I, HIGH SENSITIVITY RESULT: 15.94 NG/L — SIGNIFICANT CHANGE UP
TROPONIN I, HIGH SENSITIVITY RESULT: 23.86 NG/L — SIGNIFICANT CHANGE UP
UROBILINOGEN FLD QL: 1 MG/DL — SIGNIFICANT CHANGE UP (ref 0.2–1)
WBC # BLD: 5.37 K/UL — SIGNIFICANT CHANGE UP (ref 3.8–10.5)
WBC # FLD AUTO: 5.37 K/UL — SIGNIFICANT CHANGE UP (ref 3.8–10.5)
WBC MORPHOLOGY: NORMAL — SIGNIFICANT CHANGE UP
WBC UR QL: 0 /HPF — SIGNIFICANT CHANGE UP (ref 0–5)

## 2025-08-18 PROCEDURE — 99285 EMERGENCY DEPT VISIT HI MDM: CPT

## 2025-08-18 PROCEDURE — 84100 ASSAY OF PHOSPHORUS: CPT

## 2025-08-18 PROCEDURE — 76376 3D RENDER W/INTRP POSTPROCES: CPT

## 2025-08-18 PROCEDURE — 93005 ELECTROCARDIOGRAM TRACING: CPT

## 2025-08-18 PROCEDURE — 80053 COMPREHEN METABOLIC PANEL: CPT

## 2025-08-18 PROCEDURE — 82436 ASSAY OF URINE CHLORIDE: CPT

## 2025-08-18 PROCEDURE — 80048 BASIC METABOLIC PNL TOTAL CA: CPT

## 2025-08-18 PROCEDURE — 84484 ASSAY OF TROPONIN QUANT: CPT

## 2025-08-18 PROCEDURE — 71275 CT ANGIOGRAPHY CHEST: CPT | Mod: 26

## 2025-08-18 PROCEDURE — 70450 CT HEAD/BRAIN W/O DYE: CPT | Mod: 26

## 2025-08-18 PROCEDURE — 72125 CT NECK SPINE W/O DYE: CPT | Mod: 26

## 2025-08-18 PROCEDURE — 93010 ELECTROCARDIOGRAM REPORT: CPT | Mod: 76

## 2025-08-18 PROCEDURE — 70486 CT MAXILLOFACIAL W/O DYE: CPT | Mod: 26

## 2025-08-18 PROCEDURE — 93308 TTE F-UP OR LMTD: CPT

## 2025-08-18 PROCEDURE — 93970 EXTREMITY STUDY: CPT

## 2025-08-18 PROCEDURE — 93925 LOWER EXTREMITY STUDY: CPT

## 2025-08-18 PROCEDURE — 76376 3D RENDER W/INTRP POSTPROCES: CPT | Mod: 26

## 2025-08-18 PROCEDURE — 84133 ASSAY OF URINE POTASSIUM: CPT

## 2025-08-18 PROCEDURE — 83605 ASSAY OF LACTIC ACID: CPT

## 2025-08-18 PROCEDURE — 71045 X-RAY EXAM CHEST 1 VIEW: CPT | Mod: 26

## 2025-08-18 PROCEDURE — 36415 COLL VENOUS BLD VENIPUNCTURE: CPT

## 2025-08-18 PROCEDURE — 83036 HEMOGLOBIN GLYCOSYLATED A1C: CPT

## 2025-08-18 PROCEDURE — 84300 ASSAY OF URINE SODIUM: CPT

## 2025-08-18 PROCEDURE — 97116 GAIT TRAINING THERAPY: CPT | Mod: GP

## 2025-08-18 PROCEDURE — 99222 1ST HOSP IP/OBS MODERATE 55: CPT

## 2025-08-18 PROCEDURE — 81001 URINALYSIS AUTO W/SCOPE: CPT

## 2025-08-18 PROCEDURE — 76700 US EXAM ABDOM COMPLETE: CPT

## 2025-08-18 PROCEDURE — 97161 PT EVAL LOW COMPLEX 20 MIN: CPT | Mod: GP

## 2025-08-18 PROCEDURE — 84156 ASSAY OF PROTEIN URINE: CPT

## 2025-08-18 PROCEDURE — 85027 COMPLETE CBC AUTOMATED: CPT

## 2025-08-18 PROCEDURE — 80061 LIPID PANEL: CPT

## 2025-08-18 PROCEDURE — 83735 ASSAY OF MAGNESIUM: CPT

## 2025-08-18 PROCEDURE — 82570 ASSAY OF URINE CREATININE: CPT

## 2025-08-18 RX ORDER — SODIUM CHLORIDE 9 G/1000ML
1000 INJECTION, SOLUTION INTRAVENOUS
Refills: 0 | Status: DISCONTINUED | OUTPATIENT
Start: 2025-08-18 | End: 2025-08-18

## 2025-08-18 RX ORDER — ROSUVASTATIN CALCIUM 20 MG/1
40 TABLET, FILM COATED ORAL AT BEDTIME
Refills: 0 | Status: DISCONTINUED | OUTPATIENT
Start: 2025-08-18 | End: 2025-08-20

## 2025-08-18 RX ORDER — ALPRAZOLAM 0.5 MG
0.25 TABLET, EXTENDED RELEASE 24 HR ORAL ONCE
Refills: 0 | Status: DISCONTINUED | OUTPATIENT
Start: 2025-08-18 | End: 2025-08-18

## 2025-08-18 RX ORDER — TAMSULOSIN HYDROCHLORIDE 0.4 MG/1
1 CAPSULE ORAL
Refills: 0 | DISCHARGE

## 2025-08-18 RX ORDER — ALPRAZOLAM 0.5 MG
2 TABLET, EXTENDED RELEASE 24 HR ORAL THREE TIMES A DAY
Refills: 0 | Status: DISCONTINUED | OUTPATIENT
Start: 2025-08-18 | End: 2025-08-20

## 2025-08-18 RX ORDER — DIAZEPAM 5 MG/1
5 TABLET ORAL ONCE
Refills: 0 | Status: DISCONTINUED | OUTPATIENT
Start: 2025-08-18 | End: 2025-08-18

## 2025-08-18 RX ORDER — OXYCODONE HYDROCHLORIDE 30 MG/1
1 TABLET ORAL
Refills: 0 | DISCHARGE

## 2025-08-18 RX ORDER — ASPIRIN 325 MG
324 TABLET ORAL ONCE
Refills: 0 | Status: COMPLETED | OUTPATIENT
Start: 2025-08-18 | End: 2025-08-18

## 2025-08-18 RX ORDER — CLOPIDOGREL BISULFATE 75 MG/1
75 TABLET, FILM COATED ORAL DAILY
Refills: 0 | Status: DISCONTINUED | OUTPATIENT
Start: 2025-08-18 | End: 2025-08-20

## 2025-08-18 RX ORDER — SENNA 187 MG
2 TABLET ORAL
Refills: 0 | DISCHARGE

## 2025-08-18 RX ORDER — SODIUM CHLORIDE 9 G/1000ML
1000 INJECTION, SOLUTION INTRAVENOUS ONCE
Refills: 0 | Status: COMPLETED | OUTPATIENT
Start: 2025-08-18 | End: 2025-08-18

## 2025-08-18 RX ORDER — ACETAMINOPHEN 500 MG/5ML
2 LIQUID (ML) ORAL
Refills: 0 | DISCHARGE

## 2025-08-18 RX ORDER — LATANOPROST PF 0.05 MG/ML
1 SOLUTION/ DROPS OPHTHALMIC AT BEDTIME
Refills: 0 | Status: DISCONTINUED | OUTPATIENT
Start: 2025-08-18 | End: 2025-08-20

## 2025-08-18 RX ORDER — BRIMONIDINE TARTRATE 1.5 MG/ML
1 SOLUTION/ DROPS OPHTHALMIC
Refills: 0 | Status: DISCONTINUED | OUTPATIENT
Start: 2025-08-18 | End: 2025-08-20

## 2025-08-18 RX ORDER — ASPIRIN 325 MG
81 TABLET ORAL DAILY
Refills: 0 | Status: DISCONTINUED | OUTPATIENT
Start: 2025-08-18 | End: 2025-08-20

## 2025-08-18 RX ORDER — HEPARIN SODIUM 1000 [USP'U]/ML
5000 INJECTION INTRAVENOUS; SUBCUTANEOUS EVERY 12 HOURS
Refills: 0 | Status: DISCONTINUED | OUTPATIENT
Start: 2025-08-18 | End: 2025-08-20

## 2025-08-18 RX ORDER — TIZANIDINE 4 MG/1
4 TABLET ORAL THREE TIMES A DAY
Refills: 0 | Status: DISCONTINUED | OUTPATIENT
Start: 2025-08-18 | End: 2025-08-20

## 2025-08-18 RX ORDER — ACETAMINOPHEN 500 MG/5ML
650 LIQUID (ML) ORAL EVERY 6 HOURS
Refills: 0 | Status: DISCONTINUED | OUTPATIENT
Start: 2025-08-18 | End: 2025-08-20

## 2025-08-18 RX ORDER — SERTRALINE 100 MG/1
50 TABLET, FILM COATED ORAL DAILY
Refills: 0 | Status: DISCONTINUED | OUTPATIENT
Start: 2025-08-18 | End: 2025-08-20

## 2025-08-18 RX ORDER — MELATONIN 5 MG
3 TABLET ORAL AT BEDTIME
Refills: 0 | Status: DISCONTINUED | OUTPATIENT
Start: 2025-08-18 | End: 2025-08-20

## 2025-08-18 RX ORDER — METOPROLOL SUCCINATE 50 MG/1
25 TABLET, EXTENDED RELEASE ORAL
Refills: 0 | Status: DISCONTINUED | OUTPATIENT
Start: 2025-08-18 | End: 2025-08-20

## 2025-08-18 RX ORDER — ACETAMINOPHEN 500 MG/5ML
650 LIQUID (ML) ORAL ONCE
Refills: 0 | Status: COMPLETED | OUTPATIENT
Start: 2025-08-18 | End: 2025-08-18

## 2025-08-18 RX ORDER — METOPROLOL SUCCINATE 50 MG/1
1 TABLET, EXTENDED RELEASE ORAL
Refills: 0 | DISCHARGE

## 2025-08-18 RX ORDER — ASPIRIN 325 MG
1 TABLET ORAL
Refills: 0 | DISCHARGE

## 2025-08-18 RX ORDER — ONDANSETRON HCL/PF 4 MG/2 ML
4 VIAL (ML) INJECTION EVERY 8 HOURS
Refills: 0 | Status: DISCONTINUED | OUTPATIENT
Start: 2025-08-18 | End: 2025-08-20

## 2025-08-18 RX ORDER — NAPROXEN SODIUM 275 MG
2 TABLET ORAL
Refills: 0 | DISCHARGE

## 2025-08-18 RX ORDER — MAGNESIUM, ALUMINUM HYDROXIDE 200-200 MG
30 TABLET,CHEWABLE ORAL EVERY 4 HOURS
Refills: 0 | Status: DISCONTINUED | OUTPATIENT
Start: 2025-08-18 | End: 2025-08-20

## 2025-08-18 RX ORDER — TAMSULOSIN HYDROCHLORIDE 0.4 MG/1
0.4 CAPSULE ORAL AT BEDTIME
Refills: 0 | Status: DISCONTINUED | OUTPATIENT
Start: 2025-08-18 | End: 2025-08-20

## 2025-08-18 RX ORDER — ISOSORBIDE MONONITRATE 60 MG/1
30 TABLET, EXTENDED RELEASE ORAL DAILY
Refills: 0 | Status: DISCONTINUED | OUTPATIENT
Start: 2025-08-18 | End: 2025-08-20

## 2025-08-18 RX ADMIN — Medication 2 MILLIGRAM(S): at 21:32

## 2025-08-18 RX ADMIN — Medication 650 MILLIGRAM(S): at 16:15

## 2025-08-18 RX ADMIN — DIAZEPAM 5 MILLIGRAM(S): 5 TABLET ORAL at 16:15

## 2025-08-18 RX ADMIN — DIAZEPAM 5 MILLIGRAM(S): 5 TABLET ORAL at 23:07

## 2025-08-18 RX ADMIN — Medication 324 MILLIGRAM(S): at 21:32

## 2025-08-18 RX ADMIN — SODIUM CHLORIDE 1000 MILLILITER(S): 9 INJECTION, SOLUTION INTRAVENOUS at 17:17

## 2025-08-19 ENCOUNTER — RESULT REVIEW (OUTPATIENT)
Age: 75
End: 2025-08-19

## 2025-08-19 DIAGNOSIS — N17.9 ACUTE KIDNEY FAILURE, UNSPECIFIED: ICD-10-CM

## 2025-08-19 DIAGNOSIS — I10 ESSENTIAL (PRIMARY) HYPERTENSION: ICD-10-CM

## 2025-08-19 DIAGNOSIS — N40.0 BENIGN PROSTATIC HYPERPLASIA WITHOUT LOWER URINARY TRACT SYMPTOMS: ICD-10-CM

## 2025-08-19 DIAGNOSIS — R07.9 CHEST PAIN, UNSPECIFIED: ICD-10-CM

## 2025-08-19 DIAGNOSIS — F41.9 ANXIETY DISORDER, UNSPECIFIED: ICD-10-CM

## 2025-08-19 DIAGNOSIS — H40.9 UNSPECIFIED GLAUCOMA: ICD-10-CM

## 2025-08-19 DIAGNOSIS — I25.10 ATHEROSCLEROTIC HEART DISEASE OF NATIVE CORONARY ARTERY WITHOUT ANGINA PECTORIS: ICD-10-CM

## 2025-08-19 DIAGNOSIS — R60.0 LOCALIZED EDEMA: ICD-10-CM

## 2025-08-19 DIAGNOSIS — Z29.9 ENCOUNTER FOR PROPHYLACTIC MEASURES, UNSPECIFIED: ICD-10-CM

## 2025-08-19 DIAGNOSIS — E78.5 HYPERLIPIDEMIA, UNSPECIFIED: ICD-10-CM

## 2025-08-19 LAB
A1C WITH ESTIMATED AVERAGE GLUCOSE RESULT: 4.9 % — SIGNIFICANT CHANGE UP (ref 4–5.6)
ALBUMIN SERPL ELPH-MCNC: 3.3 G/DL — SIGNIFICANT CHANGE UP (ref 3.3–5)
ALP SERPL-CCNC: 112 U/L — SIGNIFICANT CHANGE UP (ref 40–120)
ALT FLD-CCNC: 32 U/L — SIGNIFICANT CHANGE UP (ref 12–78)
ANION GAP SERPL CALC-SCNC: 7 MMOL/L — SIGNIFICANT CHANGE UP (ref 5–17)
AST SERPL-CCNC: 27 U/L — SIGNIFICANT CHANGE UP (ref 15–37)
BILIRUB SERPL-MCNC: 0.4 MG/DL — SIGNIFICANT CHANGE UP (ref 0.2–1.2)
BUN SERPL-MCNC: 17 MG/DL — SIGNIFICANT CHANGE UP (ref 7–23)
CALCIUM SERPL-MCNC: 9.2 MG/DL — SIGNIFICANT CHANGE UP (ref 8.5–10.1)
CHLORIDE SERPL-SCNC: 108 MMOL/L — SIGNIFICANT CHANGE UP (ref 96–108)
CHLORIDE UR-SCNC: 65 MMOL/L — SIGNIFICANT CHANGE UP
CHOLEST SERPL-MCNC: 91 MG/DL — SIGNIFICANT CHANGE UP
CO2 SERPL-SCNC: 22 MMOL/L — SIGNIFICANT CHANGE UP (ref 22–31)
CREAT ?TM UR-MCNC: 75 MG/DL — SIGNIFICANT CHANGE UP
CREAT SERPL-MCNC: 1.25 MG/DL — SIGNIFICANT CHANGE UP (ref 0.5–1.3)
EGFR: 61 ML/MIN/1.73M2 — SIGNIFICANT CHANGE UP
EGFR: 61 ML/MIN/1.73M2 — SIGNIFICANT CHANGE UP
ESTIMATED AVERAGE GLUCOSE: 94 MG/DL — SIGNIFICANT CHANGE UP (ref 68–114)
GLUCOSE SERPL-MCNC: 120 MG/DL — HIGH (ref 70–99)
HCT VFR BLD CALC: 33.9 % — LOW (ref 39–50)
HDLC SERPL-MCNC: 34 MG/DL — LOW
HGB BLD-MCNC: 11.1 G/DL — LOW (ref 13–17)
LDLC SERPL-MCNC: 38 MG/DL — SIGNIFICANT CHANGE UP
LIPID PNL WITH DIRECT LDL SERPL: 38 MG/DL — SIGNIFICANT CHANGE UP
MAGNESIUM SERPL-MCNC: 1.9 MG/DL — SIGNIFICANT CHANGE UP (ref 1.6–2.6)
MCHC RBC-ENTMCNC: 30.1 PG — SIGNIFICANT CHANGE UP (ref 27–34)
MCHC RBC-ENTMCNC: 32.7 G/DL — SIGNIFICANT CHANGE UP (ref 32–36)
MCV RBC AUTO: 91.9 FL — SIGNIFICANT CHANGE UP (ref 80–100)
NONHDLC SERPL-MCNC: 57 MG/DL — SIGNIFICANT CHANGE UP
NRBC # BLD AUTO: 0 K/UL — SIGNIFICANT CHANGE UP (ref 0–0)
NRBC # FLD: 0 K/UL — SIGNIFICANT CHANGE UP (ref 0–0)
NRBC BLD AUTO-RTO: 0 /100 WBCS — SIGNIFICANT CHANGE UP (ref 0–0)
PHOSPHATE SERPL-MCNC: 3.7 MG/DL — SIGNIFICANT CHANGE UP (ref 2.5–4.5)
PLATELET # BLD AUTO: 241 K/UL — SIGNIFICANT CHANGE UP (ref 150–400)
PMV BLD: 8.6 FL — SIGNIFICANT CHANGE UP (ref 7–13)
POTASSIUM SERPL-MCNC: 3.7 MMOL/L — SIGNIFICANT CHANGE UP (ref 3.5–5.3)
POTASSIUM SERPL-SCNC: 3.7 MMOL/L — SIGNIFICANT CHANGE UP (ref 3.5–5.3)
POTASSIUM UR-SCNC: 38.6 MMOL/L — SIGNIFICANT CHANGE UP
PROT ?TM UR-MCNC: 47 MG/DL — HIGH (ref 0–12)
PROT SERPL-MCNC: 6.9 GM/DL — SIGNIFICANT CHANGE UP (ref 6–8.3)
PROT/CREAT UR-RTO: 0.6 RATIO — HIGH (ref 0–0.2)
RBC # BLD: 3.69 M/UL — LOW (ref 4.2–5.8)
RBC # FLD: 14 % — SIGNIFICANT CHANGE UP (ref 10.3–14.5)
SODIUM SERPL-SCNC: 137 MMOL/L — SIGNIFICANT CHANGE UP (ref 135–145)
SODIUM UR-SCNC: 75 MMOL/L — SIGNIFICANT CHANGE UP
TRIGL SERPL-MCNC: 96 MG/DL — SIGNIFICANT CHANGE UP
WBC # BLD: 6.73 K/UL — SIGNIFICANT CHANGE UP (ref 3.8–10.5)
WBC # FLD AUTO: 6.73 K/UL — SIGNIFICANT CHANGE UP (ref 3.8–10.5)

## 2025-08-19 PROCEDURE — 99233 SBSQ HOSP IP/OBS HIGH 50: CPT

## 2025-08-19 PROCEDURE — 93970 EXTREMITY STUDY: CPT | Mod: 26

## 2025-08-19 PROCEDURE — 93308 TTE F-UP OR LMTD: CPT | Mod: 26

## 2025-08-19 PROCEDURE — 93925 LOWER EXTREMITY STUDY: CPT | Mod: 26

## 2025-08-19 PROCEDURE — 76376 3D RENDER W/INTRP POSTPROCES: CPT | Mod: 26

## 2025-08-19 PROCEDURE — 76700 US EXAM ABDOM COMPLETE: CPT | Mod: 26

## 2025-08-19 RX ADMIN — TAMSULOSIN HYDROCHLORIDE 0.4 MILLIGRAM(S): 0.4 CAPSULE ORAL at 11:46

## 2025-08-19 RX ADMIN — SERTRALINE 50 MILLIGRAM(S): 100 TABLET, FILM COATED ORAL at 11:44

## 2025-08-19 RX ADMIN — HEPARIN SODIUM 5000 UNIT(S): 1000 INJECTION INTRAVENOUS; SUBCUTANEOUS at 22:25

## 2025-08-19 RX ADMIN — METOPROLOL SUCCINATE 25 MILLIGRAM(S): 50 TABLET, EXTENDED RELEASE ORAL at 22:22

## 2025-08-19 RX ADMIN — ISOSORBIDE MONONITRATE 30 MILLIGRAM(S): 60 TABLET, EXTENDED RELEASE ORAL at 11:45

## 2025-08-19 RX ADMIN — Medication 2 MILLIGRAM(S): at 14:11

## 2025-08-19 RX ADMIN — BRIMONIDINE TARTRATE 1 DROP(S): 1.5 SOLUTION/ DROPS OPHTHALMIC at 22:25

## 2025-08-19 RX ADMIN — Medication 2 MILLIGRAM(S): at 05:39

## 2025-08-19 RX ADMIN — METOPROLOL SUCCINATE 25 MILLIGRAM(S): 50 TABLET, EXTENDED RELEASE ORAL at 00:05

## 2025-08-19 RX ADMIN — Medication 81 MILLIGRAM(S): at 05:39

## 2025-08-19 RX ADMIN — CLOPIDOGREL BISULFATE 75 MILLIGRAM(S): 75 TABLET, FILM COATED ORAL at 11:42

## 2025-08-19 RX ADMIN — METOPROLOL SUCCINATE 25 MILLIGRAM(S): 50 TABLET, EXTENDED RELEASE ORAL at 11:44

## 2025-08-19 RX ADMIN — Medication 3 MILLIGRAM(S): at 00:04

## 2025-08-19 RX ADMIN — BRIMONIDINE TARTRATE 1 DROP(S): 1.5 SOLUTION/ DROPS OPHTHALMIC at 14:13

## 2025-08-19 RX ADMIN — Medication 2 MILLIGRAM(S): at 22:22

## 2025-08-19 RX ADMIN — ROSUVASTATIN CALCIUM 40 MILLIGRAM(S): 20 TABLET, FILM COATED ORAL at 22:21

## 2025-08-19 RX ADMIN — HEPARIN SODIUM 5000 UNIT(S): 1000 INJECTION INTRAVENOUS; SUBCUTANEOUS at 11:42

## 2025-08-19 RX ADMIN — TAMSULOSIN HYDROCHLORIDE 0.4 MILLIGRAM(S): 0.4 CAPSULE ORAL at 00:04

## 2025-08-20 ENCOUNTER — TRANSCRIPTION ENCOUNTER (OUTPATIENT)
Age: 75
End: 2025-08-20

## 2025-08-20 VITALS
DIASTOLIC BLOOD PRESSURE: 82 MMHG | SYSTOLIC BLOOD PRESSURE: 136 MMHG | HEART RATE: 76 BPM | RESPIRATION RATE: 18 BRPM | TEMPERATURE: 98 F | OXYGEN SATURATION: 100 %

## 2025-08-20 LAB
ANION GAP SERPL CALC-SCNC: 7 MMOL/L — SIGNIFICANT CHANGE UP (ref 5–17)
BUN SERPL-MCNC: 28 MG/DL — HIGH (ref 7–23)
CALCIUM SERPL-MCNC: 9.7 MG/DL — SIGNIFICANT CHANGE UP (ref 8.5–10.1)
CHLORIDE SERPL-SCNC: 109 MMOL/L — HIGH (ref 96–108)
CO2 SERPL-SCNC: 22 MMOL/L — SIGNIFICANT CHANGE UP (ref 22–31)
CREAT SERPL-MCNC: 1.17 MG/DL — SIGNIFICANT CHANGE UP (ref 0.5–1.3)
EGFR: 66 ML/MIN/1.73M2 — SIGNIFICANT CHANGE UP
EGFR: 66 ML/MIN/1.73M2 — SIGNIFICANT CHANGE UP
GLUCOSE SERPL-MCNC: 103 MG/DL — HIGH (ref 70–99)
HCT VFR BLD CALC: 31.7 % — LOW (ref 39–50)
HGB BLD-MCNC: 10.3 G/DL — LOW (ref 13–17)
MCHC RBC-ENTMCNC: 30.6 PG — SIGNIFICANT CHANGE UP (ref 27–34)
MCHC RBC-ENTMCNC: 32.5 G/DL — SIGNIFICANT CHANGE UP (ref 32–36)
MCV RBC AUTO: 94.1 FL — SIGNIFICANT CHANGE UP (ref 80–100)
NRBC # BLD AUTO: 0 K/UL — SIGNIFICANT CHANGE UP (ref 0–0)
NRBC # FLD: 0 K/UL — SIGNIFICANT CHANGE UP (ref 0–0)
NRBC BLD AUTO-RTO: 0 /100 WBCS — SIGNIFICANT CHANGE UP (ref 0–0)
PLATELET # BLD AUTO: 195 K/UL — SIGNIFICANT CHANGE UP (ref 150–400)
PMV BLD: 8.8 FL — SIGNIFICANT CHANGE UP (ref 7–13)
POTASSIUM SERPL-MCNC: 3.7 MMOL/L — SIGNIFICANT CHANGE UP (ref 3.5–5.3)
POTASSIUM SERPL-SCNC: 3.7 MMOL/L — SIGNIFICANT CHANGE UP (ref 3.5–5.3)
RBC # BLD: 3.37 M/UL — LOW (ref 4.2–5.8)
RBC # FLD: 14.2 % — SIGNIFICANT CHANGE UP (ref 10.3–14.5)
SODIUM SERPL-SCNC: 138 MMOL/L — SIGNIFICANT CHANGE UP (ref 135–145)
WBC # BLD: 5.73 K/UL — SIGNIFICANT CHANGE UP (ref 3.8–10.5)
WBC # FLD AUTO: 5.73 K/UL — SIGNIFICANT CHANGE UP (ref 3.8–10.5)

## 2025-08-20 PROCEDURE — 99239 HOSP IP/OBS DSCHRG MGMT >30: CPT

## 2025-08-20 RX ORDER — ROSUVASTATIN CALCIUM 20 MG/1
1 TABLET, FILM COATED ORAL
Refills: 0 | DISCHARGE

## 2025-08-20 RX ORDER — ISOSORBIDE MONONITRATE 60 MG/1
1 TABLET, EXTENDED RELEASE ORAL
Refills: 0 | DISCHARGE

## 2025-08-20 RX ADMIN — Medication 81 MILLIGRAM(S): at 05:41

## 2025-08-20 RX ADMIN — BRIMONIDINE TARTRATE 1 DROP(S): 1.5 SOLUTION/ DROPS OPHTHALMIC at 10:12

## 2025-08-20 RX ADMIN — SERTRALINE 50 MILLIGRAM(S): 100 TABLET, FILM COATED ORAL at 10:13

## 2025-08-20 RX ADMIN — ISOSORBIDE MONONITRATE 30 MILLIGRAM(S): 60 TABLET, EXTENDED RELEASE ORAL at 10:13

## 2025-08-20 RX ADMIN — CLOPIDOGREL BISULFATE 75 MILLIGRAM(S): 75 TABLET, FILM COATED ORAL at 10:12

## 2025-08-20 RX ADMIN — TAMSULOSIN HYDROCHLORIDE 0.4 MILLIGRAM(S): 0.4 CAPSULE ORAL at 10:13

## 2025-08-20 RX ADMIN — HEPARIN SODIUM 5000 UNIT(S): 1000 INJECTION INTRAVENOUS; SUBCUTANEOUS at 10:12

## 2025-08-20 RX ADMIN — Medication 2 MILLIGRAM(S): at 05:41

## 2025-08-20 RX ADMIN — Medication 2 MILLIGRAM(S): at 14:09

## 2025-08-20 RX ADMIN — METOPROLOL SUCCINATE 25 MILLIGRAM(S): 50 TABLET, EXTENDED RELEASE ORAL at 10:13

## 2025-08-21 RX ORDER — ROSUVASTATIN CALCIUM 20 MG/1
1 TABLET, FILM COATED ORAL
Refills: 0 | DISCHARGE

## 2025-08-21 RX ORDER — CLOPIDOGREL BISULFATE 75 MG/1
1 TABLET, FILM COATED ORAL
Qty: 30 | Refills: 1
Start: 2025-08-21 | End: 2025-10-19

## 2025-08-21 RX ORDER — ALPRAZOLAM 0.5 MG
1 TABLET, EXTENDED RELEASE 24 HR ORAL
Qty: 15 | Refills: 0
Start: 2025-08-21 | End: 2025-08-25

## 2025-08-21 RX ORDER — ROSUVASTATIN CALCIUM 20 MG/1
1 TABLET, FILM COATED ORAL
Qty: 30 | Refills: 1
Start: 2025-08-21 | End: 2025-10-19

## 2025-08-21 RX ORDER — LATANOPROST PF 0.05 MG/ML
1 SOLUTION/ DROPS OPHTHALMIC
Qty: 1 | Refills: 0
Start: 2025-08-21 | End: 2025-09-19

## 2025-08-24 LAB
CULTURE RESULTS: SIGNIFICANT CHANGE UP
CULTURE RESULTS: SIGNIFICANT CHANGE UP
SPECIMEN SOURCE: SIGNIFICANT CHANGE UP
SPECIMEN SOURCE: SIGNIFICANT CHANGE UP
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
## 2025-08-27 DIAGNOSIS — Z95.1 PRESENCE OF AORTOCORONARY BYPASS GRAFT: ICD-10-CM

## 2025-08-27 DIAGNOSIS — I25.10 ATHEROSCLEROTIC HEART DISEASE OF NATIVE CORONARY ARTERY WITHOUT ANGINA PECTORIS: ICD-10-CM

## 2025-08-27 DIAGNOSIS — I70.202 UNSPECIFIED ATHEROSCLEROSIS OF NATIVE ARTERIES OF EXTREMITIES, LEFT LEG: ICD-10-CM

## 2025-08-27 DIAGNOSIS — E78.5 HYPERLIPIDEMIA, UNSPECIFIED: ICD-10-CM

## 2025-08-27 DIAGNOSIS — F32.A DEPRESSION, UNSPECIFIED: ICD-10-CM

## 2025-08-27 DIAGNOSIS — N17.9 ACUTE KIDNEY FAILURE, UNSPECIFIED: ICD-10-CM

## 2025-08-27 DIAGNOSIS — I10 ESSENTIAL (PRIMARY) HYPERTENSION: ICD-10-CM

## 2025-08-27 DIAGNOSIS — Z86.73 PERSONAL HISTORY OF TRANSIENT ISCHEMIC ATTACK (TIA), AND CEREBRAL INFARCTION WITHOUT RESIDUAL DEFICITS: ICD-10-CM

## 2025-08-27 DIAGNOSIS — S02.5XXA FRACTURE OF TOOTH (TRAUMATIC), INITIAL ENCOUNTER FOR CLOSED FRACTURE: ICD-10-CM

## 2025-08-27 DIAGNOSIS — Z79.899 OTHER LONG TERM (CURRENT) DRUG THERAPY: ICD-10-CM

## 2025-08-27 DIAGNOSIS — R60.0 LOCALIZED EDEMA: ICD-10-CM

## 2025-08-27 DIAGNOSIS — I25.85 CHRONIC CORONARY MICROVASCULAR DYSFUNCTION: ICD-10-CM

## 2025-08-27 DIAGNOSIS — N40.1 BENIGN PROSTATIC HYPERPLASIA WITH LOWER URINARY TRACT SYMPTOMS: ICD-10-CM
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
## 2025-08-27 DIAGNOSIS — Z79.02 LONG TERM (CURRENT) USE OF ANTITHROMBOTICS/ANTIPLATELETS: ICD-10-CM

## 2025-08-27 DIAGNOSIS — R33.8 OTHER RETENTION OF URINE: ICD-10-CM

## 2025-08-27 DIAGNOSIS — E87.20 ACIDOSIS, UNSPECIFIED: ICD-10-CM

## 2025-08-27 DIAGNOSIS — Z79.82 LONG TERM (CURRENT) USE OF ASPIRIN: ICD-10-CM

## 2025-09-19 ENCOUNTER — TRANSCRIPTION ENCOUNTER (OUTPATIENT)
Age: 75
End: 2025-09-19